# Patient Record
Sex: MALE | Race: WHITE | NOT HISPANIC OR LATINO | Employment: OTHER | URBAN - METROPOLITAN AREA
[De-identification: names, ages, dates, MRNs, and addresses within clinical notes are randomized per-mention and may not be internally consistent; named-entity substitution may affect disease eponyms.]

---

## 2017-01-03 ENCOUNTER — OFFICE VISIT - HEALTHEAST (OUTPATIENT)
Dept: INTERNAL MEDICINE | Facility: CLINIC | Age: 77
End: 2017-01-03

## 2017-01-03 DIAGNOSIS — I10 ESSENTIAL HYPERTENSION WITH GOAL BLOOD PRESSURE LESS THAN 140/90: ICD-10-CM

## 2017-01-03 DIAGNOSIS — E78.5 HYPERLIPIDEMIA, UNSPECIFIED HYPERLIPIDEMIA TYPE: ICD-10-CM

## 2017-01-03 DIAGNOSIS — I48.91 ATRIAL FIBRILLATION (H): ICD-10-CM

## 2017-01-03 ASSESSMENT — MIFFLIN-ST. JEOR: SCORE: 1627.5

## 2017-01-05 ENCOUNTER — OFFICE VISIT - HEALTHEAST (OUTPATIENT)
Dept: PHYSICAL THERAPY | Facility: REHABILITATION | Age: 77
End: 2017-01-05

## 2017-01-05 DIAGNOSIS — R29.3 ABNORMAL POSTURE: ICD-10-CM

## 2017-01-05 DIAGNOSIS — M62.81 MUSCLE WEAKNESS (GENERALIZED): ICD-10-CM

## 2017-01-05 DIAGNOSIS — Z87.39 HISTORY OF ROTATOR CUFF TEAR: ICD-10-CM

## 2017-01-05 DIAGNOSIS — M25.811 SHOULDER IMPINGEMENT, RIGHT: ICD-10-CM

## 2017-01-05 DIAGNOSIS — M25.611 DECREASED ROM OF RIGHT SHOULDER: ICD-10-CM

## 2017-01-05 DIAGNOSIS — M25.511 ACUTE PAIN OF RIGHT SHOULDER: ICD-10-CM

## 2017-01-05 DIAGNOSIS — M25.511 RIGHT SHOULDER PAIN: ICD-10-CM

## 2017-01-10 ENCOUNTER — OFFICE VISIT - HEALTHEAST (OUTPATIENT)
Dept: PHYSICAL THERAPY | Facility: REHABILITATION | Age: 77
End: 2017-01-10

## 2017-01-10 DIAGNOSIS — M25.611 DECREASED ROM OF RIGHT SHOULDER: ICD-10-CM

## 2017-01-10 DIAGNOSIS — K21.9 ESOPHAGEAL REFLUX: ICD-10-CM

## 2017-01-10 DIAGNOSIS — R29.3 ABNORMAL POSTURE: ICD-10-CM

## 2017-01-10 DIAGNOSIS — M25.811 SHOULDER IMPINGEMENT, RIGHT: ICD-10-CM

## 2017-01-10 DIAGNOSIS — M25.511 ACUTE PAIN OF RIGHT SHOULDER: ICD-10-CM

## 2017-01-10 DIAGNOSIS — M25.511 RIGHT SHOULDER PAIN: ICD-10-CM

## 2017-01-10 DIAGNOSIS — Z87.39 HISTORY OF ROTATOR CUFF TEAR: ICD-10-CM

## 2017-01-10 DIAGNOSIS — M62.81 MUSCLE WEAKNESS (GENERALIZED): ICD-10-CM

## 2017-01-12 ENCOUNTER — OFFICE VISIT - HEALTHEAST (OUTPATIENT)
Dept: PHYSICAL THERAPY | Facility: REHABILITATION | Age: 77
End: 2017-01-12

## 2017-01-12 DIAGNOSIS — M25.511 ACUTE PAIN OF RIGHT SHOULDER: ICD-10-CM

## 2017-01-12 DIAGNOSIS — M62.81 MUSCLE WEAKNESS (GENERALIZED): ICD-10-CM

## 2017-01-12 DIAGNOSIS — R29.3 ABNORMAL POSTURE: ICD-10-CM

## 2017-01-12 DIAGNOSIS — M25.611 DECREASED ROM OF RIGHT SHOULDER: ICD-10-CM

## 2017-01-12 DIAGNOSIS — M25.811 SHOULDER IMPINGEMENT, RIGHT: ICD-10-CM

## 2017-01-12 DIAGNOSIS — M25.511 RIGHT SHOULDER PAIN: ICD-10-CM

## 2017-01-12 DIAGNOSIS — Z87.39 HISTORY OF ROTATOR CUFF TEAR: ICD-10-CM

## 2017-01-13 ENCOUNTER — RECORDS - HEALTHEAST (OUTPATIENT)
Dept: ADMINISTRATIVE | Facility: OTHER | Age: 77
End: 2017-01-13

## 2017-01-17 ENCOUNTER — OFFICE VISIT - HEALTHEAST (OUTPATIENT)
Dept: PHYSICAL THERAPY | Facility: REHABILITATION | Age: 77
End: 2017-01-17

## 2017-01-17 ENCOUNTER — AMBULATORY - HEALTHEAST (OUTPATIENT)
Dept: CARDIOLOGY | Facility: CLINIC | Age: 77
End: 2017-01-17

## 2017-01-17 DIAGNOSIS — M25.511 RIGHT SHOULDER PAIN: ICD-10-CM

## 2017-01-17 DIAGNOSIS — R29.3 ABNORMAL POSTURE: ICD-10-CM

## 2017-01-17 DIAGNOSIS — M25.611 DECREASED ROM OF RIGHT SHOULDER: ICD-10-CM

## 2017-01-17 DIAGNOSIS — M25.511 ACUTE PAIN OF RIGHT SHOULDER: ICD-10-CM

## 2017-01-17 DIAGNOSIS — I48.91 A-FIB (H): ICD-10-CM

## 2017-01-17 DIAGNOSIS — M25.811 SHOULDER IMPINGEMENT, RIGHT: ICD-10-CM

## 2017-01-17 DIAGNOSIS — M62.81 MUSCLE WEAKNESS (GENERALIZED): ICD-10-CM

## 2017-01-17 DIAGNOSIS — Z87.39 HISTORY OF ROTATOR CUFF TEAR: ICD-10-CM

## 2017-01-17 DIAGNOSIS — K21.9 ESOPHAGEAL REFLUX: ICD-10-CM

## 2017-01-19 ENCOUNTER — OFFICE VISIT - HEALTHEAST (OUTPATIENT)
Dept: PHYSICAL THERAPY | Facility: REHABILITATION | Age: 77
End: 2017-01-19

## 2017-01-19 DIAGNOSIS — R29.3 ABNORMAL POSTURE: ICD-10-CM

## 2017-01-19 DIAGNOSIS — M25.611 DECREASED ROM OF RIGHT SHOULDER: ICD-10-CM

## 2017-01-19 DIAGNOSIS — M62.81 MUSCLE WEAKNESS (GENERALIZED): ICD-10-CM

## 2017-01-19 DIAGNOSIS — M25.511 ACUTE PAIN OF RIGHT SHOULDER: ICD-10-CM

## 2017-01-19 DIAGNOSIS — Z87.39 HISTORY OF ROTATOR CUFF TEAR: ICD-10-CM

## 2017-01-19 DIAGNOSIS — M25.811 SHOULDER IMPINGEMENT, RIGHT: ICD-10-CM

## 2017-01-21 ENCOUNTER — COMMUNICATION - HEALTHEAST (OUTPATIENT)
Dept: INTERNAL MEDICINE | Facility: CLINIC | Age: 77
End: 2017-01-21

## 2017-01-26 ENCOUNTER — RECORDS - HEALTHEAST (OUTPATIENT)
Dept: ADMINISTRATIVE | Facility: OTHER | Age: 77
End: 2017-01-26

## 2017-02-09 ENCOUNTER — COMMUNICATION - HEALTHEAST (OUTPATIENT)
Dept: CARDIOLOGY | Facility: CLINIC | Age: 77
End: 2017-02-09

## 2017-02-10 ENCOUNTER — AMBULATORY - HEALTHEAST (OUTPATIENT)
Dept: CARDIOLOGY | Facility: CLINIC | Age: 77
End: 2017-02-10

## 2017-02-10 DIAGNOSIS — I48.19 PERSISTENT ATRIAL FIBRILLATION (H): ICD-10-CM

## 2017-02-15 ENCOUNTER — COMMUNICATION - HEALTHEAST (OUTPATIENT)
Dept: CARDIOLOGY | Facility: CLINIC | Age: 77
End: 2017-02-15

## 2017-02-21 ENCOUNTER — COMMUNICATION - HEALTHEAST (OUTPATIENT)
Dept: INTERNAL MEDICINE | Facility: CLINIC | Age: 77
End: 2017-02-21

## 2017-02-21 ENCOUNTER — COMMUNICATION - HEALTHEAST (OUTPATIENT)
Dept: CARDIOLOGY | Facility: CLINIC | Age: 77
End: 2017-02-21

## 2017-03-02 ENCOUNTER — AMBULATORY - HEALTHEAST (OUTPATIENT)
Dept: PODIATRY | Facility: CLINIC | Age: 77
End: 2017-03-02

## 2017-03-02 DIAGNOSIS — B35.1 NAIL FUNGUS: ICD-10-CM

## 2017-03-02 DIAGNOSIS — L60.2 ONYCHAUXIS: ICD-10-CM

## 2017-03-03 ENCOUNTER — HOSPITAL ENCOUNTER (OUTPATIENT)
Dept: CARDIOLOGY | Facility: CLINIC | Age: 77
Discharge: HOME | End: 2017-03-03
Attending: INTERNAL MEDICINE

## 2017-03-03 DIAGNOSIS — I48.91 A-FIB (H): ICD-10-CM

## 2017-03-03 LAB
AORTIC ROOT: 4 CM
AORTIC VALVE MEAN VELOCITY: 117 CM/S
AR DECEL SLOPE: 1420 MM/S2
AR PEAK VELOCITY: 330 CM/S
ASCENDING AORTA: 3.6 CM
AV DIMENSIONLESS INDEX VTI: 0.8
AV MEAN GRADIENT: 6 MMHG
AV PEAK GRADIENT: 11.2 MMHG
AV REGURGITANT PEAK GRADIENT: 43.6 MMHG
AV REGURGITATION PRESSURE HALF TIME: 585 MS
AV VALVE AREA: 3 CM2
AV VELOCITY RATIO: 0.7
DOP CALC AO PEAK VEL: 167 CM/S
DOP CALC AO VTI: 32.7 CM
DOP CALC LVOT AREA: 3.8 CM2
DOP CALC LVOT DIAMETER: 2.2 CM
DOP CALC LVOT PEAK VEL: 120 CM/S
DOP CALC LVOT STROKE VOLUME: 96.5 CM3
DOP CALC MV VTI: 16.6 CM
DOP CALC RVOT PEAK VEL: 56.9 CM/S
DOP CALC RVOT VTI: 11.5 CM
DOP CALCLVOT PEAK VEL VTI: 25.4 CM
EJECTION FRACTION: 59 % (ref 55–75)
FRACTIONAL SHORTENING: 50 % (ref 28–44)
INTERVENTRICULAR SEPTUM IN END DIASTOLE: 1.3 CM (ref 0.6–1)
IVS/PW RATIO: 0.8
LA AREA 1: 37.7 CM2
LA AREA 2: 30.7 CM2
LEFT ATRIUM LENGTH: 6.59 CM
LEFT ATRIUM SIZE: 6.7 CM
LEFT ATRIUM TO AORTIC ROOT RATIO: 1.68 NO UNITS
LEFT ATRIUM VOLUME: 149.3 CM3
LEFT VENTRICLE DIASTOLIC VOLUME: 107 CM3 (ref 62–150)
LEFT VENTRICLE SYSTOLIC VOLUME: 44 CM3 (ref 21–61)
LEFT VENTRICULAR INTERNAL DIMENSION IN DIASTOLE: 5 CM (ref 4.2–5.8)
LEFT VENTRICULAR INTERNAL DIMENSION IN SYSTOLE: 2.5 CM (ref 2.5–4)
LEFT VENTRICULAR MASS: 306.8 G
LEFT VENTRICULAR OUTFLOW TRACT MEAN GRADIENT: 3 MMHG
LEFT VENTRICULAR OUTFLOW TRACT MEAN VELOCITY: 84 CM/S
LEFT VENTRICULAR OUTFLOW TRACT PEAK GRADIENT: 6 MMHG
LEFT VENTRICULAR POSTERIOR WALL IN END DIASTOLE: 1.6 CM (ref 0.6–1)
MITRAL REGURGITANT VELOCITY TIME INTEGRAL: 153 CM
MITRAL VALVE MEAN INFLOW VELOCITY: 60.7 CM/S
MITRAL VALVE PEAK VELOCITY: 101 CM/S
MR MEAN GRADIENT: 61 MMHG
MR MEAN VELOCITY: 363 CM/S
MR PEAK GRADIENT: 104.9 MMHG
MV AREA VTI: 5.81 CM2
MV AVERAGE E/E' RATIO: 8 CM/S
MV DECELERATION TIME: 246 MS
MV E'TISSUE VEL-LAT: 15.7 CM/S
MV E'TISSUE VEL-MED: 7.35 CM/S
MV LATERAL E/E' RATIO: 5.9
MV MEAN GRADIENT: 2 MMHG
MV MEDIAL E/E' RATIO: 12.6
MV PEAK E VELOCITY: 92.6 CM/S
MV PEAK GRADIENT: 4.1 MMHG
MV VALVE AREA BY CONTINUITY EQUATION: 5.8 CM2
PISA MR PEAK VEL: 512 CM/S
PV MEAN GRADIENT: 2 MMHG
PV MEAN VELOCITY: 71.4 CM/S
PV PEAK GRADIENT: 4.8 MMHG
PV VELOCITY TIME INTERVAL: 21.1 CM
PV VMAX: 109 CM/S
RIGHT VENTRICULAR INTERNAL DIMENSION IN DYSTOLE: 4.7 CM
RIGHT VENTRICULAR OUTFLOW PEAK GRADIENT: 1 MMHG
RIGHT VENTRICULAR OUTFLOW TRACT MEAN GRADIENT: 1 MMHG
RVOT MV: 35.4 CM/S
TRICUSPID REGURGITATION PEAK PRESSURE GRADIENT: 13.8 MMHG
TRICUSPID VALVE ANULAR PLANE SYSTOLIC EXCURSION: 2.3 CM
TRICUSPID VALVE PEAK REGURGITANT VELOCITY: 186 CM/S

## 2017-03-08 ENCOUNTER — OFFICE VISIT - HEALTHEAST (OUTPATIENT)
Dept: CARDIOLOGY | Facility: CLINIC | Age: 77
End: 2017-03-08

## 2017-03-08 ENCOUNTER — AMBULATORY - HEALTHEAST (OUTPATIENT)
Dept: CARDIOLOGY | Facility: CLINIC | Age: 77
End: 2017-03-08

## 2017-03-08 ENCOUNTER — COMMUNICATION - HEALTHEAST (OUTPATIENT)
Dept: CARDIOLOGY | Facility: CLINIC | Age: 77
End: 2017-03-08

## 2017-03-08 DIAGNOSIS — Z79.899 LONG TERM USE OF DRUG: ICD-10-CM

## 2017-03-08 DIAGNOSIS — I48.19 PERSISTENT ATRIAL FIBRILLATION (H): ICD-10-CM

## 2017-03-08 DIAGNOSIS — I10 ESSENTIAL HYPERTENSION WITH GOAL BLOOD PRESSURE LESS THAN 140/90: ICD-10-CM

## 2017-03-08 ASSESSMENT — MIFFLIN-ST. JEOR: SCORE: 1626.14

## 2017-03-09 ENCOUNTER — SURGERY - HEALTHEAST (OUTPATIENT)
Dept: CARDIOLOGY | Facility: CLINIC | Age: 77
End: 2017-03-09

## 2017-03-09 ENCOUNTER — AMBULATORY - HEALTHEAST (OUTPATIENT)
Dept: CARDIOLOGY | Facility: CLINIC | Age: 77
End: 2017-03-09

## 2017-03-09 DIAGNOSIS — I48.19 PERSISTENT ATRIAL FIBRILLATION (H): ICD-10-CM

## 2017-03-10 ENCOUNTER — SURGERY - HEALTHEAST (OUTPATIENT)
Dept: CARDIOLOGY | Facility: CLINIC | Age: 77
End: 2017-03-10

## 2017-03-10 DIAGNOSIS — I48.19 PERSISTENT ATRIAL FIBRILLATION (H): ICD-10-CM

## 2017-03-13 ENCOUNTER — AMBULATORY - HEALTHEAST (OUTPATIENT)
Dept: CARDIOLOGY | Facility: CLINIC | Age: 77
End: 2017-03-13

## 2017-03-15 ENCOUNTER — AMBULATORY - HEALTHEAST (OUTPATIENT)
Dept: CARDIOLOGY | Facility: CLINIC | Age: 77
End: 2017-03-15

## 2017-03-17 ENCOUNTER — AMBULATORY - HEALTHEAST (OUTPATIENT)
Dept: CARDIOLOGY | Facility: CLINIC | Age: 77
End: 2017-03-17

## 2017-03-17 DIAGNOSIS — I48.19 PERSISTENT ATRIAL FIBRILLATION (H): ICD-10-CM

## 2017-03-22 ENCOUNTER — ANESTHESIA - HEALTHEAST (OUTPATIENT)
Dept: CARDIOLOGY | Facility: CLINIC | Age: 77
End: 2017-03-22

## 2017-03-24 ENCOUNTER — COMMUNICATION - HEALTHEAST (OUTPATIENT)
Dept: PHYSICAL THERAPY | Facility: REHABILITATION | Age: 77
End: 2017-03-24

## 2017-03-28 ENCOUNTER — AMBULATORY - HEALTHEAST (OUTPATIENT)
Dept: CARDIOLOGY | Facility: CLINIC | Age: 77
End: 2017-03-28

## 2017-03-31 ENCOUNTER — COMMUNICATION - HEALTHEAST (OUTPATIENT)
Dept: CARDIOLOGY | Facility: CLINIC | Age: 77
End: 2017-03-31

## 2017-04-24 ENCOUNTER — AMBULATORY - HEALTHEAST (OUTPATIENT)
Dept: ADMINISTRATIVE | Facility: REHABILITATION | Age: 77
End: 2017-04-24

## 2017-04-24 DIAGNOSIS — M17.12 PRIMARY OSTEOARTHRITIS OF LEFT KNEE: ICD-10-CM

## 2017-04-24 DIAGNOSIS — M25.561 KNEE PAIN, BILATERAL: ICD-10-CM

## 2017-04-24 DIAGNOSIS — M25.562 KNEE PAIN, BILATERAL: ICD-10-CM

## 2017-04-26 ENCOUNTER — AMBULATORY - HEALTHEAST (OUTPATIENT)
Dept: CARDIOLOGY | Facility: CLINIC | Age: 77
End: 2017-04-26

## 2017-04-26 ENCOUNTER — HOSPITAL ENCOUNTER (OUTPATIENT)
Dept: CT IMAGING | Facility: CLINIC | Age: 77
Discharge: HOME | End: 2017-04-26
Attending: INTERNAL MEDICINE

## 2017-04-26 DIAGNOSIS — I48.19 PERSISTENT ATRIAL FIBRILLATION (H): ICD-10-CM

## 2017-04-26 LAB
ATRIAL RATE - MUSE: 241 BPM
BSA FOR ECHO PROCEDURE: 2.07 M2
DIASTOLIC BLOOD PRESSURE - MUSE: NORMAL MMHG
INTERPRETATION ECG - MUSE: NORMAL
P AXIS - MUSE: NORMAL DEGREES
PR INTERVAL - MUSE: NORMAL MS
QRS DURATION - MUSE: 126 MS
QT - MUSE: 416 MS
QTC - MUSE: 479 MS
R AXIS - MUSE: -2 DEGREES
SYSTOLIC BLOOD PRESSURE - MUSE: NORMAL MMHG
T AXIS - MUSE: 53 DEGREES
VENTRICULAR RATE- MUSE: 80 BPM

## 2017-04-26 ASSESSMENT — MIFFLIN-ST. JEOR
SCORE: 1569.89
SCORE: 1597.11

## 2017-05-04 ENCOUNTER — OFFICE VISIT - HEALTHEAST (OUTPATIENT)
Dept: PHYSICAL THERAPY | Facility: REHABILITATION | Age: 77
End: 2017-05-04

## 2017-05-04 DIAGNOSIS — M67.00 HEEL CORD TIGHTNESS, UNSPECIFIED LATERALITY: ICD-10-CM

## 2017-05-04 DIAGNOSIS — M25.562 KNEE PAIN, BILATERAL: ICD-10-CM

## 2017-05-04 DIAGNOSIS — M62.81 MUSCLE WEAKNESS (GENERALIZED): ICD-10-CM

## 2017-05-04 DIAGNOSIS — M25.562 ACUTE PAIN OF BOTH KNEES: ICD-10-CM

## 2017-05-04 DIAGNOSIS — M25.662 DECREASED ROM OF LEFT KNEE: ICD-10-CM

## 2017-05-04 DIAGNOSIS — M25.561 KNEE PAIN, BILATERAL: ICD-10-CM

## 2017-05-04 DIAGNOSIS — M25.561 ACUTE PAIN OF BOTH KNEES: ICD-10-CM

## 2017-05-18 ENCOUNTER — OFFICE VISIT - HEALTHEAST (OUTPATIENT)
Dept: PHYSICAL THERAPY | Facility: REHABILITATION | Age: 77
End: 2017-05-18

## 2017-05-18 DIAGNOSIS — M67.00 HEEL CORD TIGHTNESS, UNSPECIFIED LATERALITY: ICD-10-CM

## 2017-05-18 DIAGNOSIS — M25.662 DECREASED ROM OF LEFT KNEE: ICD-10-CM

## 2017-05-18 DIAGNOSIS — M62.81 MUSCLE WEAKNESS (GENERALIZED): ICD-10-CM

## 2017-05-18 DIAGNOSIS — M25.562 ACUTE PAIN OF BOTH KNEES: ICD-10-CM

## 2017-05-18 DIAGNOSIS — M25.561 ACUTE PAIN OF BOTH KNEES: ICD-10-CM

## 2017-05-22 ENCOUNTER — AMBULATORY - HEALTHEAST (OUTPATIENT)
Dept: CARDIOLOGY | Facility: CLINIC | Age: 77
End: 2017-05-22

## 2017-05-22 ENCOUNTER — OFFICE VISIT - HEALTHEAST (OUTPATIENT)
Dept: CARDIOLOGY | Facility: CLINIC | Age: 77
End: 2017-05-22

## 2017-05-22 ENCOUNTER — COMMUNICATION - HEALTHEAST (OUTPATIENT)
Dept: CARDIOLOGY | Facility: CLINIC | Age: 77
End: 2017-05-22

## 2017-05-22 DIAGNOSIS — I10 ESSENTIAL HYPERTENSION WITH GOAL BLOOD PRESSURE LESS THAN 140/90: ICD-10-CM

## 2017-05-22 DIAGNOSIS — I48.19 PERSISTENT ATRIAL FIBRILLATION (H): ICD-10-CM

## 2017-05-22 LAB
ATRIAL RATE - MUSE: 83 BPM
DIASTOLIC BLOOD PRESSURE - MUSE: NORMAL MMHG
INTERPRETATION ECG - MUSE: NORMAL
P AXIS - MUSE: NORMAL DEGREES
PR INTERVAL - MUSE: NORMAL MS
QRS DURATION - MUSE: 124 MS
QT - MUSE: 432 MS
QTC - MUSE: 459 MS
R AXIS - MUSE: -2 DEGREES
SYSTOLIC BLOOD PRESSURE - MUSE: NORMAL MMHG
T AXIS - MUSE: 46 DEGREES
VENTRICULAR RATE- MUSE: 68 BPM

## 2017-05-22 ASSESSMENT — MIFFLIN-ST. JEOR: SCORE: 1601.65

## 2017-05-25 ENCOUNTER — ANESTHESIA - HEALTHEAST (OUTPATIENT)
Dept: CARDIOLOGY | Facility: CLINIC | Age: 77
End: 2017-05-25

## 2017-05-25 ENCOUNTER — SURGERY - HEALTHEAST (OUTPATIENT)
Dept: CARDIOLOGY | Facility: CLINIC | Age: 77
End: 2017-05-25

## 2017-05-26 ENCOUNTER — SURGERY - HEALTHEAST (OUTPATIENT)
Dept: CARDIOLOGY | Facility: CLINIC | Age: 77
End: 2017-05-26

## 2017-05-26 ASSESSMENT — MIFFLIN-ST. JEOR: SCORE: 1547.21

## 2017-05-27 ASSESSMENT — MIFFLIN-ST. JEOR: SCORE: 1621.6

## 2017-06-05 ENCOUNTER — OFFICE VISIT - HEALTHEAST (OUTPATIENT)
Dept: INTERNAL MEDICINE | Facility: CLINIC | Age: 77
End: 2017-06-05

## 2017-06-05 DIAGNOSIS — J18.9 PNEUMONIA OF BOTH LOWER LOBES DUE TO INFECTIOUS ORGANISM: ICD-10-CM

## 2017-06-05 DIAGNOSIS — I48.4 ATYPICAL ATRIAL FLUTTER (H): ICD-10-CM

## 2017-06-05 DIAGNOSIS — M19.90 OSTEOARTHRITIS: ICD-10-CM

## 2017-06-05 DIAGNOSIS — J18.9 PNEUMONIA: ICD-10-CM

## 2017-06-05 ASSESSMENT — MIFFLIN-ST. JEOR: SCORE: 1601.65

## 2017-06-16 ENCOUNTER — OFFICE VISIT - HEALTHEAST (OUTPATIENT)
Dept: PHYSICAL THERAPY | Facility: REHABILITATION | Age: 77
End: 2017-06-16

## 2017-06-16 DIAGNOSIS — M25.561 ACUTE PAIN OF BOTH KNEES: ICD-10-CM

## 2017-06-16 DIAGNOSIS — M62.81 MUSCLE WEAKNESS (GENERALIZED): ICD-10-CM

## 2017-06-16 DIAGNOSIS — M67.00 HEEL CORD TIGHTNESS, UNSPECIFIED LATERALITY: ICD-10-CM

## 2017-06-16 DIAGNOSIS — M25.562 ACUTE PAIN OF BOTH KNEES: ICD-10-CM

## 2017-06-16 DIAGNOSIS — M25.662 DECREASED ROM OF LEFT KNEE: ICD-10-CM

## 2017-06-22 ENCOUNTER — AMBULATORY - HEALTHEAST (OUTPATIENT)
Dept: PODIATRY | Facility: CLINIC | Age: 77
End: 2017-06-22

## 2017-06-22 DIAGNOSIS — L60.2 ONYCHAUXIS: ICD-10-CM

## 2017-06-22 DIAGNOSIS — B35.1 NAIL FUNGUS: ICD-10-CM

## 2017-06-26 ENCOUNTER — COMMUNICATION - HEALTHEAST (OUTPATIENT)
Dept: INTERNAL MEDICINE | Facility: CLINIC | Age: 77
End: 2017-06-26

## 2017-06-27 ENCOUNTER — COMMUNICATION - HEALTHEAST (OUTPATIENT)
Dept: INTERNAL MEDICINE | Facility: CLINIC | Age: 77
End: 2017-06-27

## 2017-07-03 ENCOUNTER — RECORDS - HEALTHEAST (OUTPATIENT)
Dept: GENERAL RADIOLOGY | Facility: CLINIC | Age: 77
End: 2017-07-03

## 2017-07-03 ENCOUNTER — OFFICE VISIT - HEALTHEAST (OUTPATIENT)
Dept: INTERNAL MEDICINE | Facility: CLINIC | Age: 77
End: 2017-07-03

## 2017-07-03 DIAGNOSIS — J18.9 PNEUMONIA OF BOTH LOWER LOBES DUE TO INFECTIOUS ORGANISM: ICD-10-CM

## 2017-07-03 DIAGNOSIS — J32.9 SINUS INFECTION: ICD-10-CM

## 2017-07-03 DIAGNOSIS — J18.9 PNEUMONIA, UNSPECIFIED ORGANISM: ICD-10-CM

## 2017-07-03 ASSESSMENT — MIFFLIN-ST. JEOR: SCORE: 1615.25

## 2017-07-31 ENCOUNTER — OFFICE VISIT - HEALTHEAST (OUTPATIENT)
Dept: CARDIOLOGY | Facility: CLINIC | Age: 77
End: 2017-07-31

## 2017-07-31 DIAGNOSIS — I10 ESSENTIAL HYPERTENSION WITH GOAL BLOOD PRESSURE LESS THAN 140/90: ICD-10-CM

## 2017-07-31 DIAGNOSIS — I48.19 PERSISTENT ATRIAL FIBRILLATION (H): ICD-10-CM

## 2017-07-31 DIAGNOSIS — I48.4 ATYPICAL ATRIAL FLUTTER (H): ICD-10-CM

## 2017-07-31 DIAGNOSIS — Z98.890 STATUS POST CATHETER ABLATION OF ATRIAL FIBRILLATION: ICD-10-CM

## 2017-07-31 ASSESSMENT — MIFFLIN-ST. JEOR: SCORE: 1606.18

## 2017-08-01 ENCOUNTER — HOSPITAL ENCOUNTER (OUTPATIENT)
Dept: CARDIOLOGY | Facility: CLINIC | Age: 77
Discharge: HOME | End: 2017-08-01
Attending: NURSE PRACTITIONER

## 2017-08-01 DIAGNOSIS — I48.4 ATYPICAL ATRIAL FLUTTER (H): ICD-10-CM

## 2017-08-01 DIAGNOSIS — Z98.890 STATUS POST CATHETER ABLATION OF ATRIAL FIBRILLATION: ICD-10-CM

## 2017-08-01 DIAGNOSIS — I48.19 PERSISTENT ATRIAL FIBRILLATION (H): ICD-10-CM

## 2017-08-14 ENCOUNTER — COMMUNICATION - HEALTHEAST (OUTPATIENT)
Dept: CARDIOLOGY | Facility: CLINIC | Age: 77
End: 2017-08-14

## 2017-08-14 ENCOUNTER — COMMUNICATION - HEALTHEAST (OUTPATIENT)
Dept: INTERNAL MEDICINE | Facility: CLINIC | Age: 77
End: 2017-08-14

## 2017-08-14 DIAGNOSIS — I48.91 ATRIAL FIBRILLATION (H): ICD-10-CM

## 2017-08-14 DIAGNOSIS — I48.19 PERSISTENT ATRIAL FIBRILLATION (H): ICD-10-CM

## 2017-08-15 ENCOUNTER — COMMUNICATION - HEALTHEAST (OUTPATIENT)
Dept: CARDIOLOGY | Facility: CLINIC | Age: 77
End: 2017-08-15

## 2017-08-16 ENCOUNTER — SURGERY - HEALTHEAST (OUTPATIENT)
Dept: CARDIOLOGY | Facility: CLINIC | Age: 77
End: 2017-08-16

## 2017-08-16 ENCOUNTER — AMBULATORY - HEALTHEAST (OUTPATIENT)
Dept: CARDIOLOGY | Facility: CLINIC | Age: 77
End: 2017-08-16

## 2017-08-16 DIAGNOSIS — I48.19 PERSISTENT ATRIAL FIBRILLATION (H): ICD-10-CM

## 2017-08-16 DIAGNOSIS — I48.91 A-FIB (H): ICD-10-CM

## 2017-08-16 ASSESSMENT — MIFFLIN-ST. JEOR: SCORE: 1606.18

## 2017-08-17 LAB
ATRIAL RATE - MUSE: 133 BPM
DIASTOLIC BLOOD PRESSURE - MUSE: NORMAL MMHG
INTERPRETATION ECG - MUSE: NORMAL
P AXIS - MUSE: NORMAL DEGREES
PR INTERVAL - MUSE: NORMAL MS
QRS DURATION - MUSE: 114 MS
QT - MUSE: 384 MS
QTC - MUSE: 467 MS
R AXIS - MUSE: -18 DEGREES
SYSTOLIC BLOOD PRESSURE - MUSE: NORMAL MMHG
T AXIS - MUSE: 63 DEGREES
VENTRICULAR RATE- MUSE: 89 BPM

## 2017-08-18 ENCOUNTER — ANESTHESIA - HEALTHEAST (OUTPATIENT)
Dept: CARDIOLOGY | Facility: CLINIC | Age: 77
End: 2017-08-18

## 2017-08-29 ENCOUNTER — OFFICE VISIT - HEALTHEAST (OUTPATIENT)
Dept: CARDIOLOGY | Facility: CLINIC | Age: 77
End: 2017-08-29

## 2017-08-29 ENCOUNTER — AMBULATORY - HEALTHEAST (OUTPATIENT)
Dept: CARDIOLOGY | Facility: CLINIC | Age: 77
End: 2017-08-29

## 2017-08-29 DIAGNOSIS — Z98.890 STATUS POST CATHETER ABLATION OF ATRIAL FIBRILLATION: ICD-10-CM

## 2017-08-29 DIAGNOSIS — I10 ESSENTIAL HYPERTENSION WITH GOAL BLOOD PRESSURE LESS THAN 140/90: ICD-10-CM

## 2017-08-29 DIAGNOSIS — I48.19 PERSISTENT ATRIAL FIBRILLATION (H): ICD-10-CM

## 2017-08-29 ASSESSMENT — MIFFLIN-ST. JEOR: SCORE: 1619.79

## 2017-09-05 ENCOUNTER — HOSPITAL ENCOUNTER (OUTPATIENT)
Dept: CARDIOLOGY | Facility: CLINIC | Age: 77
Discharge: HOME | End: 2017-09-05
Attending: INTERNAL MEDICINE

## 2017-09-05 DIAGNOSIS — I48.19 PERSISTENT ATRIAL FIBRILLATION (H): ICD-10-CM

## 2017-09-05 DIAGNOSIS — I10 ESSENTIAL HYPERTENSION WITH GOAL BLOOD PRESSURE LESS THAN 140/90: ICD-10-CM

## 2017-09-07 ENCOUNTER — RECORDS - HEALTHEAST (OUTPATIENT)
Dept: ADMINISTRATIVE | Facility: OTHER | Age: 77
End: 2017-09-07

## 2017-09-25 ENCOUNTER — AMBULATORY - HEALTHEAST (OUTPATIENT)
Dept: CARDIOLOGY | Facility: CLINIC | Age: 77
End: 2017-09-25

## 2017-09-25 ENCOUNTER — COMMUNICATION - HEALTHEAST (OUTPATIENT)
Dept: INTERNAL MEDICINE | Facility: CLINIC | Age: 77
End: 2017-09-25

## 2017-09-25 ENCOUNTER — AMBULATORY - HEALTHEAST (OUTPATIENT)
Dept: PODIATRY | Facility: CLINIC | Age: 77
End: 2017-09-25

## 2017-09-25 ENCOUNTER — COMMUNICATION - HEALTHEAST (OUTPATIENT)
Dept: CARDIOLOGY | Facility: CLINIC | Age: 77
End: 2017-09-25

## 2017-09-25 DIAGNOSIS — B35.1 NAIL FUNGUS: ICD-10-CM

## 2017-09-25 DIAGNOSIS — I48.19 PERSISTENT ATRIAL FIBRILLATION (H): ICD-10-CM

## 2017-09-25 DIAGNOSIS — L60.2 ONYCHAUXIS: ICD-10-CM

## 2017-09-25 LAB
ATRIAL RATE - MUSE: 277 BPM
DIASTOLIC BLOOD PRESSURE - MUSE: NORMAL MMHG
INTERPRETATION ECG - MUSE: NORMAL
P AXIS - MUSE: NORMAL DEGREES
PR INTERVAL - MUSE: NORMAL MS
QRS DURATION - MUSE: 116 MS
QT - MUSE: 394 MS
QTC - MUSE: 476 MS
R AXIS - MUSE: -7 DEGREES
SYSTOLIC BLOOD PRESSURE - MUSE: NORMAL MMHG
T AXIS - MUSE: 50 DEGREES
VENTRICULAR RATE- MUSE: 88 BPM

## 2017-09-25 ASSESSMENT — MIFFLIN-ST. JEOR: SCORE: 1619.79

## 2017-09-26 ENCOUNTER — COMMUNICATION - HEALTHEAST (OUTPATIENT)
Dept: CARDIOLOGY | Facility: CLINIC | Age: 77
End: 2017-09-26

## 2017-09-26 ENCOUNTER — AMBULATORY - HEALTHEAST (OUTPATIENT)
Dept: CARDIOLOGY | Facility: CLINIC | Age: 77
End: 2017-09-26

## 2017-09-26 ENCOUNTER — SURGERY - HEALTHEAST (OUTPATIENT)
Dept: CARDIOLOGY | Facility: CLINIC | Age: 77
End: 2017-09-26

## 2017-09-26 DIAGNOSIS — I48.4 ATYPICAL ATRIAL FLUTTER (H): ICD-10-CM

## 2017-09-27 ENCOUNTER — COMMUNICATION - HEALTHEAST (OUTPATIENT)
Dept: CARDIOLOGY | Facility: CLINIC | Age: 77
End: 2017-09-27

## 2017-09-28 ENCOUNTER — COMMUNICATION - HEALTHEAST (OUTPATIENT)
Dept: INTERNAL MEDICINE | Facility: CLINIC | Age: 77
End: 2017-09-28

## 2017-09-28 DIAGNOSIS — Z12.5 ENCOUNTER FOR PROSTATE CANCER SCREENING: ICD-10-CM

## 2017-09-28 DIAGNOSIS — E55.9 VITAMIN D DEFICIENCY: ICD-10-CM

## 2017-09-28 DIAGNOSIS — Z79.899 MEDICATION MANAGEMENT: ICD-10-CM

## 2017-10-04 ENCOUNTER — OFFICE VISIT - HEALTHEAST (OUTPATIENT)
Dept: INTERNAL MEDICINE | Facility: CLINIC | Age: 77
End: 2017-10-04

## 2017-10-04 DIAGNOSIS — I48.4 ATYPICAL ATRIAL FLUTTER (H): ICD-10-CM

## 2017-10-04 DIAGNOSIS — C61 MALIGNANT NEOPLASM OF PROSTATE (H): ICD-10-CM

## 2017-10-04 DIAGNOSIS — E55.9 VITAMIN D DEFICIENCY: ICD-10-CM

## 2017-10-04 DIAGNOSIS — Z00.01 ENCOUNTER FOR GENERAL ADULT MEDICAL EXAMINATION WITH ABNORMAL FINDINGS: ICD-10-CM

## 2017-10-04 DIAGNOSIS — E78.5 HYPERLIPIDEMIA, UNSPECIFIED HYPERLIPIDEMIA TYPE: ICD-10-CM

## 2017-10-04 LAB
CHOLEST SERPL-MCNC: 168 MG/DL
FASTING STATUS PATIENT QL REPORTED: NO
HDLC SERPL-MCNC: 34 MG/DL
LDLC SERPL CALC-MCNC: 122 MG/DL
PSA SERPL-MCNC: 1.8 NG/ML (ref 0–6.5)
TRIGL SERPL-MCNC: 62 MG/DL

## 2017-10-04 ASSESSMENT — MIFFLIN-ST. JEOR: SCORE: 1590.87

## 2017-10-06 ENCOUNTER — HOSPITAL ENCOUNTER (OUTPATIENT)
Dept: CARDIOLOGY | Facility: CLINIC | Age: 77
Discharge: HOME | End: 2017-10-06
Attending: NURSE PRACTITIONER

## 2017-10-07 ENCOUNTER — COMMUNICATION - HEALTHEAST (OUTPATIENT)
Dept: INTERNAL MEDICINE | Facility: CLINIC | Age: 77
End: 2017-10-07

## 2017-11-15 ENCOUNTER — AMBULATORY - HEALTHEAST (OUTPATIENT)
Dept: ADMINISTRATIVE | Facility: REHABILITATION | Age: 77
End: 2017-11-15

## 2017-11-15 DIAGNOSIS — G89.29 CHRONIC LEFT HIP PAIN: ICD-10-CM

## 2017-11-15 DIAGNOSIS — M25.552 CHRONIC LEFT HIP PAIN: ICD-10-CM

## 2017-11-29 ENCOUNTER — OFFICE VISIT - HEALTHEAST (OUTPATIENT)
Dept: CARDIOLOGY | Facility: CLINIC | Age: 77
End: 2017-11-29

## 2017-11-29 DIAGNOSIS — I10 ESSENTIAL HYPERTENSION WITH GOAL BLOOD PRESSURE LESS THAN 140/90: ICD-10-CM

## 2017-11-29 DIAGNOSIS — I48.19 PERSISTENT ATRIAL FIBRILLATION (H): ICD-10-CM

## 2017-11-29 DIAGNOSIS — I48.4 ATYPICAL ATRIAL FLUTTER (H): ICD-10-CM

## 2017-11-29 LAB
ATRIAL RATE - MUSE: 58 BPM
DIASTOLIC BLOOD PRESSURE - MUSE: NORMAL MMHG
INTERPRETATION ECG - MUSE: NORMAL
P AXIS - MUSE: 63 DEGREES
PR INTERVAL - MUSE: 230 MS
QRS DURATION - MUSE: 110 MS
QT - MUSE: 454 MS
QTC - MUSE: 445 MS
R AXIS - MUSE: 19 DEGREES
SYSTOLIC BLOOD PRESSURE - MUSE: NORMAL MMHG
T AXIS - MUSE: 59 DEGREES
VENTRICULAR RATE- MUSE: 58 BPM

## 2017-11-29 ASSESSMENT — MIFFLIN-ST. JEOR: SCORE: 1599.94

## 2017-12-01 ENCOUNTER — OFFICE VISIT - HEALTHEAST (OUTPATIENT)
Dept: PHYSICAL THERAPY | Facility: REHABILITATION | Age: 77
End: 2017-12-01

## 2017-12-01 DIAGNOSIS — G89.29 CHRONIC LEFT HIP PAIN: ICD-10-CM

## 2017-12-01 DIAGNOSIS — M25.552 CHRONIC LEFT HIP PAIN: ICD-10-CM

## 2017-12-01 DIAGNOSIS — M62.81 MUSCLE WEAKNESS (GENERALIZED): ICD-10-CM

## 2017-12-01 DIAGNOSIS — M25.669 DECREASED ROM OF LOWER EXTREMITY: ICD-10-CM

## 2017-12-05 ENCOUNTER — COMMUNICATION - HEALTHEAST (OUTPATIENT)
Dept: INTERNAL MEDICINE | Facility: CLINIC | Age: 77
End: 2017-12-05

## 2017-12-05 DIAGNOSIS — I10 ESSENTIAL HYPERTENSION: ICD-10-CM

## 2017-12-13 ENCOUNTER — COMMUNICATION - HEALTHEAST (OUTPATIENT)
Dept: INTERNAL MEDICINE | Facility: CLINIC | Age: 77
End: 2017-12-13

## 2017-12-14 ENCOUNTER — COMMUNICATION - HEALTHEAST (OUTPATIENT)
Dept: INTERNAL MEDICINE | Facility: CLINIC | Age: 77
End: 2017-12-14

## 2017-12-14 ENCOUNTER — COMMUNICATION - HEALTHEAST (OUTPATIENT)
Dept: CARDIOLOGY | Facility: CLINIC | Age: 77
End: 2017-12-14

## 2017-12-14 RX ORDER — CLOTRIMAZOLE AND BETAMETHASONE DIPROPIONATE 10; .5 MG/ML; MG/ML
LOTION TOPICAL
Qty: 60 ML | Status: SHIPPED | OUTPATIENT
Start: 2017-12-14

## 2017-12-16 ENCOUNTER — COMMUNICATION - HEALTHEAST (OUTPATIENT)
Dept: INTERNAL MEDICINE | Facility: CLINIC | Age: 77
End: 2017-12-16

## 2017-12-18 ENCOUNTER — RECORDS - HEALTHEAST (OUTPATIENT)
Dept: ADMINISTRATIVE | Facility: OTHER | Age: 77
End: 2017-12-18

## 2017-12-19 ENCOUNTER — OFFICE VISIT - HEALTHEAST (OUTPATIENT)
Dept: PHYSICAL THERAPY | Facility: REHABILITATION | Age: 77
End: 2017-12-19

## 2017-12-19 DIAGNOSIS — M62.81 MUSCLE WEAKNESS (GENERALIZED): ICD-10-CM

## 2017-12-19 DIAGNOSIS — G89.29 CHRONIC LEFT HIP PAIN: ICD-10-CM

## 2017-12-19 DIAGNOSIS — M25.552 CHRONIC LEFT HIP PAIN: ICD-10-CM

## 2017-12-19 DIAGNOSIS — M25.669 DECREASED ROM OF LOWER EXTREMITY: ICD-10-CM

## 2017-12-21 ENCOUNTER — AMBULATORY - HEALTHEAST (OUTPATIENT)
Dept: PODIATRY | Facility: CLINIC | Age: 77
End: 2017-12-21

## 2017-12-21 DIAGNOSIS — L60.2 ONYCHAUXIS: ICD-10-CM

## 2017-12-21 DIAGNOSIS — B35.1 NAIL FUNGUS: ICD-10-CM

## 2018-01-05 ENCOUNTER — OFFICE VISIT - HEALTHEAST (OUTPATIENT)
Dept: PHYSICAL THERAPY | Facility: REHABILITATION | Age: 78
End: 2018-01-05

## 2018-01-05 DIAGNOSIS — G89.29 CHRONIC LEFT HIP PAIN: ICD-10-CM

## 2018-01-05 DIAGNOSIS — M25.552 CHRONIC LEFT HIP PAIN: ICD-10-CM

## 2018-01-05 DIAGNOSIS — M25.669 DECREASED ROM OF LOWER EXTREMITY: ICD-10-CM

## 2018-01-05 DIAGNOSIS — M62.81 MUSCLE WEAKNESS (GENERALIZED): ICD-10-CM

## 2018-01-08 ENCOUNTER — COMMUNICATION - HEALTHEAST (OUTPATIENT)
Dept: INTERNAL MEDICINE | Facility: CLINIC | Age: 78
End: 2018-01-08

## 2018-01-26 ENCOUNTER — OFFICE VISIT - HEALTHEAST (OUTPATIENT)
Dept: PHYSICAL THERAPY | Facility: REHABILITATION | Age: 78
End: 2018-01-26

## 2018-01-26 DIAGNOSIS — M25.669 DECREASED ROM OF LOWER EXTREMITY: ICD-10-CM

## 2018-01-26 DIAGNOSIS — M62.81 MUSCLE WEAKNESS (GENERALIZED): ICD-10-CM

## 2018-01-26 DIAGNOSIS — G89.29 CHRONIC LEFT HIP PAIN: ICD-10-CM

## 2018-01-26 DIAGNOSIS — M25.552 CHRONIC LEFT HIP PAIN: ICD-10-CM

## 2018-02-13 ENCOUNTER — RECORDS - HEALTHEAST (OUTPATIENT)
Dept: ADMINISTRATIVE | Facility: OTHER | Age: 78
End: 2018-02-13

## 2018-02-13 ENCOUNTER — COMMUNICATION - HEALTHEAST (OUTPATIENT)
Dept: PHYSICAL THERAPY | Facility: REHABILITATION | Age: 78
End: 2018-02-13

## 2018-03-02 ENCOUNTER — OFFICE VISIT - HEALTHEAST (OUTPATIENT)
Dept: PHYSICAL THERAPY | Facility: REHABILITATION | Age: 78
End: 2018-03-02

## 2018-03-02 DIAGNOSIS — G89.29 CHRONIC LEFT HIP PAIN: ICD-10-CM

## 2018-03-02 DIAGNOSIS — M25.552 CHRONIC LEFT HIP PAIN: ICD-10-CM

## 2018-03-02 DIAGNOSIS — M62.81 MUSCLE WEAKNESS (GENERALIZED): ICD-10-CM

## 2018-03-02 DIAGNOSIS — M25.669 DECREASED ROM OF LOWER EXTREMITY: ICD-10-CM

## 2018-03-08 ENCOUNTER — COMMUNICATION - HEALTHEAST (OUTPATIENT)
Dept: CARDIOLOGY | Facility: CLINIC | Age: 78
End: 2018-03-08

## 2018-03-08 DIAGNOSIS — I48.91 A-FIB (H): ICD-10-CM

## 2018-03-16 ENCOUNTER — OFFICE VISIT - HEALTHEAST (OUTPATIENT)
Dept: PHYSICAL THERAPY | Facility: REHABILITATION | Age: 78
End: 2018-03-16

## 2018-03-16 DIAGNOSIS — M25.552 CHRONIC LEFT HIP PAIN: ICD-10-CM

## 2018-03-16 DIAGNOSIS — M62.81 MUSCLE WEAKNESS (GENERALIZED): ICD-10-CM

## 2018-03-16 DIAGNOSIS — G89.29 CHRONIC LEFT HIP PAIN: ICD-10-CM

## 2018-03-16 DIAGNOSIS — M25.669 DECREASED ROM OF LOWER EXTREMITY: ICD-10-CM

## 2018-03-22 ENCOUNTER — AMBULATORY - HEALTHEAST (OUTPATIENT)
Dept: PODIATRY | Facility: CLINIC | Age: 78
End: 2018-03-22

## 2018-03-22 DIAGNOSIS — L60.2 ONYCHAUXIS: ICD-10-CM

## 2018-03-22 DIAGNOSIS — B35.1 NAIL FUNGUS: ICD-10-CM

## 2018-03-27 ENCOUNTER — OFFICE VISIT - HEALTHEAST (OUTPATIENT)
Dept: PHYSICAL THERAPY | Facility: REHABILITATION | Age: 78
End: 2018-03-27

## 2018-03-27 DIAGNOSIS — M25.669 DECREASED ROM OF LOWER EXTREMITY: ICD-10-CM

## 2018-03-27 DIAGNOSIS — M62.81 MUSCLE WEAKNESS (GENERALIZED): ICD-10-CM

## 2018-03-27 DIAGNOSIS — M25.552 CHRONIC LEFT HIP PAIN: ICD-10-CM

## 2018-03-27 DIAGNOSIS — G89.29 CHRONIC LEFT HIP PAIN: ICD-10-CM

## 2018-04-01 ENCOUNTER — COMMUNICATION - HEALTHEAST (OUTPATIENT)
Dept: CARDIOLOGY | Facility: CLINIC | Age: 78
End: 2018-04-01

## 2018-04-02 ENCOUNTER — OFFICE VISIT - HEALTHEAST (OUTPATIENT)
Dept: CARDIOLOGY | Facility: CLINIC | Age: 78
End: 2018-04-02

## 2018-04-02 ENCOUNTER — COMMUNICATION - HEALTHEAST (OUTPATIENT)
Dept: CARDIOLOGY | Facility: CLINIC | Age: 78
End: 2018-04-02

## 2018-04-02 DIAGNOSIS — I48.19 PERSISTENT ATRIAL FIBRILLATION (H): ICD-10-CM

## 2018-04-02 DIAGNOSIS — I10 ESSENTIAL HYPERTENSION WITH GOAL BLOOD PRESSURE LESS THAN 140/90: ICD-10-CM

## 2018-04-02 DIAGNOSIS — I48.4 ATYPICAL ATRIAL FLUTTER (H): ICD-10-CM

## 2018-04-02 ASSESSMENT — MIFFLIN-ST. JEOR: SCORE: 1609.01

## 2018-04-03 ENCOUNTER — AMBULATORY - HEALTHEAST (OUTPATIENT)
Dept: CARDIOLOGY | Facility: CLINIC | Age: 78
End: 2018-04-03

## 2018-04-04 ENCOUNTER — ANESTHESIA - HEALTHEAST (OUTPATIENT)
Dept: CARDIOLOGY | Facility: CLINIC | Age: 78
End: 2018-04-04

## 2018-04-04 LAB
ATRIAL RATE - MUSE: 288 BPM
DIASTOLIC BLOOD PRESSURE - MUSE: NORMAL MMHG
INTERPRETATION ECG - MUSE: NORMAL
P AXIS - MUSE: NORMAL DEGREES
PR INTERVAL - MUSE: NORMAL MS
QRS DURATION - MUSE: 110 MS
QT - MUSE: 360 MS
QTC - MUSE: 469 MS
R AXIS - MUSE: -6 DEGREES
SYSTOLIC BLOOD PRESSURE - MUSE: NORMAL MMHG
T AXIS - MUSE: 45 DEGREES
VENTRICULAR RATE- MUSE: 102 BPM

## 2018-04-24 ENCOUNTER — RECORDS - HEALTHEAST (OUTPATIENT)
Dept: ADMINISTRATIVE | Facility: OTHER | Age: 78
End: 2018-04-24

## 2018-05-01 ENCOUNTER — COMMUNICATION - HEALTHEAST (OUTPATIENT)
Dept: INTERNAL MEDICINE | Facility: CLINIC | Age: 78
End: 2018-05-01

## 2018-05-01 ENCOUNTER — OFFICE VISIT - HEALTHEAST (OUTPATIENT)
Dept: INTERNAL MEDICINE | Facility: CLINIC | Age: 78
End: 2018-05-01

## 2018-05-01 DIAGNOSIS — Z01.818 PREOP EXAM FOR INTERNAL MEDICINE: ICD-10-CM

## 2018-05-01 LAB
ANION GAP SERPL CALCULATED.3IONS-SCNC: 9 MMOL/L (ref 5–18)
BUN SERPL-MCNC: 16 MG/DL (ref 8–28)
CALCIUM SERPL-MCNC: 9.9 MG/DL (ref 8.5–10.5)
CHLORIDE BLD-SCNC: 99 MMOL/L (ref 98–107)
CO2 SERPL-SCNC: 26 MMOL/L (ref 22–31)
CREAT SERPL-MCNC: 0.77 MG/DL (ref 0.7–1.3)
GFR SERPL CREATININE-BSD FRML MDRD: >60 ML/MIN/1.73M2
GLUCOSE BLD-MCNC: 71 MG/DL (ref 70–125)
HGB BLD-MCNC: 13.1 G/DL (ref 14–18)
POTASSIUM BLD-SCNC: 4.8 MMOL/L (ref 3.5–5)
SODIUM SERPL-SCNC: 134 MMOL/L (ref 136–145)

## 2018-05-01 ASSESSMENT — MIFFLIN-ST. JEOR: SCORE: 1615.25

## 2018-05-24 ASSESSMENT — MIFFLIN-ST. JEOR: SCORE: 1569.89

## 2018-05-25 ENCOUNTER — COMMUNICATION - HEALTHEAST (OUTPATIENT)
Dept: INTERNAL MEDICINE | Facility: CLINIC | Age: 78
End: 2018-05-25

## 2018-05-25 RX ORDER — KETOCONAZOLE 20 MG/G
1 CREAM TOPICAL 2 TIMES DAILY PRN
Qty: 15 G | Refills: 0 | Status: SHIPPED | OUTPATIENT
Start: 2018-05-25

## 2018-05-29 ENCOUNTER — OFFICE VISIT - HEALTHEAST (OUTPATIENT)
Dept: CARDIOLOGY | Facility: CLINIC | Age: 78
End: 2018-05-29

## 2018-05-29 ENCOUNTER — COMMUNICATION - HEALTHEAST (OUTPATIENT)
Dept: INTERNAL MEDICINE | Facility: CLINIC | Age: 78
End: 2018-05-29

## 2018-05-29 DIAGNOSIS — I48.19 PERSISTENT ATRIAL FIBRILLATION (H): ICD-10-CM

## 2018-05-29 DIAGNOSIS — I48.4 ATYPICAL ATRIAL FLUTTER (H): ICD-10-CM

## 2018-05-29 DIAGNOSIS — Z98.890 STATUS POST CATHETER ABLATION OF ATRIAL FIBRILLATION: ICD-10-CM

## 2018-05-29 ASSESSMENT — MIFFLIN-ST. JEOR: SCORE: 1606.18

## 2018-05-30 ENCOUNTER — ANESTHESIA - HEALTHEAST (OUTPATIENT)
Dept: SURGERY | Facility: CLINIC | Age: 78
End: 2018-05-30

## 2018-05-30 ENCOUNTER — SURGERY - HEALTHEAST (OUTPATIENT)
Dept: SURGERY | Facility: CLINIC | Age: 78
End: 2018-05-30

## 2018-05-30 ASSESSMENT — MIFFLIN-ST. JEOR
SCORE: 1568.53
SCORE: 1565.36

## 2018-06-03 ENCOUNTER — COMMUNICATION - HEALTHEAST (OUTPATIENT)
Dept: INTERNAL MEDICINE | Facility: CLINIC | Age: 78
End: 2018-06-03

## 2018-06-03 DIAGNOSIS — I10 ESSENTIAL HYPERTENSION: ICD-10-CM

## 2018-06-08 ENCOUNTER — OFFICE VISIT - HEALTHEAST (OUTPATIENT)
Dept: INTERNAL MEDICINE | Facility: CLINIC | Age: 78
End: 2018-06-08

## 2018-06-08 DIAGNOSIS — D64.9 ANEMIA, UNSPECIFIED TYPE: ICD-10-CM

## 2018-06-08 DIAGNOSIS — M16.12 PRIMARY OSTEOARTHRITIS OF LEFT HIP: ICD-10-CM

## 2018-06-08 DIAGNOSIS — K59.00 CONSTIPATION: ICD-10-CM

## 2018-06-08 DIAGNOSIS — G56.02 CARPAL TUNNEL SYNDROME OF LEFT WRIST: ICD-10-CM

## 2018-06-08 LAB
ERYTHROCYTE [DISTWIDTH] IN BLOOD BY AUTOMATED COUNT: 11 % (ref 11–14.5)
HCT VFR BLD AUTO: 31.2 % (ref 40–54)
HGB BLD-MCNC: 10.8 G/DL (ref 14–18)
MCH RBC QN AUTO: 31.7 PG (ref 27–34)
MCHC RBC AUTO-ENTMCNC: 34.7 G/DL (ref 32–36)
MCV RBC AUTO: 91 FL (ref 80–100)
PLATELET # BLD AUTO: 408 THOU/UL (ref 140–440)
PMV BLD AUTO: 7.2 FL (ref 7–10)
RBC # BLD AUTO: 3.41 MILL/UL (ref 4.4–6.2)
WBC: 8.9 THOU/UL (ref 4–11)

## 2018-06-14 ENCOUNTER — RECORDS - HEALTHEAST (OUTPATIENT)
Dept: ADMINISTRATIVE | Facility: OTHER | Age: 78
End: 2018-06-14

## 2018-07-06 ENCOUNTER — HOSPITAL ENCOUNTER (OUTPATIENT)
Dept: PHYSICAL MEDICINE AND REHAB | Facility: CLINIC | Age: 78
Discharge: HOME | End: 2018-07-06
Attending: INTERNAL MEDICINE

## 2018-07-06 DIAGNOSIS — G56.02 CARPAL TUNNEL SYNDROME OF LEFT WRIST: ICD-10-CM

## 2018-07-17 ENCOUNTER — RECORDS - HEALTHEAST (OUTPATIENT)
Dept: ADMINISTRATIVE | Facility: OTHER | Age: 78
End: 2018-07-17

## 2018-08-08 ENCOUNTER — COMMUNICATION - HEALTHEAST (OUTPATIENT)
Dept: CARDIOLOGY | Facility: CLINIC | Age: 78
End: 2018-08-08

## 2018-08-15 ENCOUNTER — OFFICE VISIT - HEALTHEAST (OUTPATIENT)
Dept: CARDIOLOGY | Facility: CLINIC | Age: 78
End: 2018-08-15

## 2018-08-15 DIAGNOSIS — I48.4 ATYPICAL ATRIAL FLUTTER (H): ICD-10-CM

## 2018-08-15 DIAGNOSIS — I48.19 PERSISTENT ATRIAL FIBRILLATION (H): ICD-10-CM

## 2018-08-15 DIAGNOSIS — I10 ESSENTIAL HYPERTENSION WITH GOAL BLOOD PRESSURE LESS THAN 140/90: ICD-10-CM

## 2018-08-15 LAB
ATRIAL RATE - MUSE: 288 BPM
DIASTOLIC BLOOD PRESSURE - MUSE: NORMAL MMHG
INTERPRETATION ECG - MUSE: NORMAL
P AXIS - MUSE: 84 DEGREES
PR INTERVAL - MUSE: NORMAL MS
QRS DURATION - MUSE: 108 MS
QT - MUSE: 408 MS
QTC - MUSE: 459 MS
R AXIS - MUSE: 5 DEGREES
SYSTOLIC BLOOD PRESSURE - MUSE: NORMAL MMHG
T AXIS - MUSE: 53 DEGREES
VENTRICULAR RATE- MUSE: 76 BPM

## 2018-08-15 ASSESSMENT — MIFFLIN-ST. JEOR: SCORE: 1589.17

## 2018-08-16 ENCOUNTER — AMBULATORY - HEALTHEAST (OUTPATIENT)
Dept: CARDIOLOGY | Facility: CLINIC | Age: 78
End: 2018-08-16

## 2018-08-17 ENCOUNTER — COMMUNICATION - HEALTHEAST (OUTPATIENT)
Dept: CARDIOLOGY | Facility: CLINIC | Age: 78
End: 2018-08-17

## 2018-08-17 ENCOUNTER — COMMUNICATION - HEALTHEAST (OUTPATIENT)
Dept: INTERNAL MEDICINE | Facility: CLINIC | Age: 78
End: 2018-08-17

## 2018-08-17 DIAGNOSIS — I48.0 PAROXYSMAL ATRIAL FIBRILLATION (H): ICD-10-CM

## 2018-08-20 ENCOUNTER — AMBULATORY - HEALTHEAST (OUTPATIENT)
Dept: CARDIOLOGY | Facility: CLINIC | Age: 78
End: 2018-08-20

## 2018-08-20 DIAGNOSIS — I48.19 PERSISTENT ATRIAL FIBRILLATION (H): ICD-10-CM

## 2018-08-20 DIAGNOSIS — I48.0 PAROXYSMAL ATRIAL FIBRILLATION (H): ICD-10-CM

## 2018-08-20 LAB
ATRIAL RATE - MUSE: 52 BPM
DIASTOLIC BLOOD PRESSURE - MUSE: NORMAL MMHG
INTERPRETATION ECG - MUSE: NORMAL
P AXIS - MUSE: 65 DEGREES
PR INTERVAL - MUSE: 244 MS
QRS DURATION - MUSE: 112 MS
QT - MUSE: 500 MS
QTC - MUSE: 465 MS
R AXIS - MUSE: 17 DEGREES
SYSTOLIC BLOOD PRESSURE - MUSE: NORMAL MMHG
T AXIS - MUSE: 47 DEGREES
VENTRICULAR RATE- MUSE: 52 BPM

## 2018-08-20 ASSESSMENT — MIFFLIN-ST. JEOR: SCORE: 1593.71

## 2018-08-29 ENCOUNTER — COMMUNICATION - HEALTHEAST (OUTPATIENT)
Dept: CARDIOLOGY | Facility: CLINIC | Age: 78
End: 2018-08-29

## 2018-08-29 DIAGNOSIS — I48.19 PERSISTENT ATRIAL FIBRILLATION (H): ICD-10-CM

## 2018-09-04 ENCOUNTER — RECORDS - HEALTHEAST (OUTPATIENT)
Dept: ADMINISTRATIVE | Facility: OTHER | Age: 78
End: 2018-09-04

## 2018-09-15 ENCOUNTER — COMMUNICATION - HEALTHEAST (OUTPATIENT)
Dept: CARDIOLOGY | Facility: CLINIC | Age: 78
End: 2018-09-15

## 2018-09-15 DIAGNOSIS — I48.91 A-FIB (H): ICD-10-CM

## 2018-09-17 ENCOUNTER — COMMUNICATION - HEALTHEAST (OUTPATIENT)
Dept: CARDIOLOGY | Facility: CLINIC | Age: 78
End: 2018-09-17

## 2018-09-17 DIAGNOSIS — I48.19 PERSISTENT ATRIAL FIBRILLATION (H): ICD-10-CM

## 2018-09-17 RX ORDER — SOTALOL HYDROCHLORIDE 80 MG/1
80 TABLET ORAL EVERY 12 HOURS
Qty: 180 TABLET | Refills: 1 | Status: SHIPPED | OUTPATIENT
Start: 2018-09-17

## 2018-09-20 ENCOUNTER — OFFICE VISIT - HEALTHEAST (OUTPATIENT)
Dept: PODIATRY | Facility: CLINIC | Age: 78
End: 2018-09-20

## 2018-09-20 DIAGNOSIS — B35.1 NAIL FUNGUS: ICD-10-CM

## 2018-09-20 DIAGNOSIS — L60.2 ONYCHAUXIS: ICD-10-CM

## 2018-10-08 ENCOUNTER — OFFICE VISIT - HEALTHEAST (OUTPATIENT)
Dept: INTERNAL MEDICINE | Facility: CLINIC | Age: 78
End: 2018-10-08

## 2018-10-08 DIAGNOSIS — E55.9 VITAMIN D DEFICIENCY: ICD-10-CM

## 2018-10-08 DIAGNOSIS — C61 PROSTATE CANCER (H): ICD-10-CM

## 2018-10-08 DIAGNOSIS — I10 ESSENTIAL HYPERTENSION WITH GOAL BLOOD PRESSURE LESS THAN 140/90: ICD-10-CM

## 2018-10-08 DIAGNOSIS — E78.5 HYPERLIPIDEMIA, UNSPECIFIED HYPERLIPIDEMIA TYPE: ICD-10-CM

## 2018-10-08 DIAGNOSIS — Z23 FLU VACCINE NEED: ICD-10-CM

## 2018-10-08 DIAGNOSIS — M25.362 KNEE INSTABILITY, LEFT: ICD-10-CM

## 2018-10-08 DIAGNOSIS — I48.0 PAROXYSMAL ATRIAL FIBRILLATION (H): ICD-10-CM

## 2018-10-08 LAB
ALBUMIN SERPL-MCNC: 4 G/DL (ref 3.5–5)
ALBUMIN UR-MCNC: NEGATIVE MG/DL
ALP SERPL-CCNC: 94 U/L (ref 45–120)
ALT SERPL W P-5'-P-CCNC: 13 U/L (ref 0–45)
ANION GAP SERPL CALCULATED.3IONS-SCNC: 8 MMOL/L (ref 5–18)
APPEARANCE UR: CLEAR
AST SERPL W P-5'-P-CCNC: 14 U/L (ref 0–40)
BILIRUB DIRECT SERPL-MCNC: 0.2 MG/DL
BILIRUB SERPL-MCNC: 0.8 MG/DL (ref 0–1)
BILIRUB UR QL STRIP: NEGATIVE
BUN SERPL-MCNC: 16 MG/DL (ref 8–28)
CALCIUM SERPL-MCNC: 9.6 MG/DL (ref 8.5–10.5)
CHLORIDE BLD-SCNC: 100 MMOL/L (ref 98–107)
CHOLEST SERPL-MCNC: 165 MG/DL
CO2 SERPL-SCNC: 26 MMOL/L (ref 22–31)
COLOR UR AUTO: YELLOW
CREAT SERPL-MCNC: 0.72 MG/DL (ref 0.7–1.3)
ERYTHROCYTE [DISTWIDTH] IN BLOOD BY AUTOMATED COUNT: 11.5 % (ref 11–14.5)
FASTING STATUS PATIENT QL REPORTED: YES
GFR SERPL CREATININE-BSD FRML MDRD: >60 ML/MIN/1.73M2
GLUCOSE BLD-MCNC: 87 MG/DL (ref 70–125)
GLUCOSE UR STRIP-MCNC: NEGATIVE MG/DL
HCT VFR BLD AUTO: 39 % (ref 40–54)
HDLC SERPL-MCNC: 31 MG/DL
HGB BLD-MCNC: 13.4 G/DL (ref 14–18)
HGB UR QL STRIP: NEGATIVE
KETONES UR STRIP-MCNC: NEGATIVE MG/DL
LDLC SERPL CALC-MCNC: 117 MG/DL
LEUKOCYTE ESTERASE UR QL STRIP: NEGATIVE
MCH RBC QN AUTO: 30.7 PG (ref 27–34)
MCHC RBC AUTO-ENTMCNC: 34.3 G/DL (ref 32–36)
MCV RBC AUTO: 90 FL (ref 80–100)
NITRATE UR QL: NEGATIVE
PH UR STRIP: 7 [PH] (ref 5–8)
PLATELET # BLD AUTO: 227 THOU/UL (ref 140–440)
PMV BLD AUTO: 7.8 FL (ref 7–10)
POTASSIUM BLD-SCNC: 4.3 MMOL/L (ref 3.5–5)
PROT SERPL-MCNC: 6.6 G/DL (ref 6–8)
PSA SERPL-MCNC: 2 NG/ML (ref 0–6.5)
RBC # BLD AUTO: 4.35 MILL/UL (ref 4.4–6.2)
SODIUM SERPL-SCNC: 134 MMOL/L (ref 136–145)
SP GR UR STRIP: 1.01 (ref 1–1.03)
TRIGL SERPL-MCNC: 83 MG/DL
TSH SERPL DL<=0.005 MIU/L-ACNC: 1.53 UIU/ML (ref 0.3–5)
UROBILINOGEN UR STRIP-ACNC: NORMAL
WBC: 6 THOU/UL (ref 4–11)

## 2018-10-08 ASSESSMENT — MIFFLIN-ST. JEOR: SCORE: 1580.95

## 2018-10-09 LAB
25(OH)D3 SERPL-MCNC: 28.8 NG/ML (ref 30–80)
25(OH)D3 SERPL-MCNC: 28.8 NG/ML (ref 30–80)

## 2018-10-15 ENCOUNTER — COMMUNICATION - HEALTHEAST (OUTPATIENT)
Dept: CARDIOLOGY | Facility: CLINIC | Age: 78
End: 2018-10-15

## 2018-11-01 ENCOUNTER — OFFICE VISIT - HEALTHEAST (OUTPATIENT)
Dept: INTERNAL MEDICINE | Facility: CLINIC | Age: 78
End: 2018-11-01

## 2018-11-01 DIAGNOSIS — K21.9 GASTROESOPHAGEAL REFLUX DISEASE WITHOUT ESOPHAGITIS: ICD-10-CM

## 2018-11-01 DIAGNOSIS — C61 MALIGNANT NEOPLASM OF PROSTATE (H): ICD-10-CM

## 2018-11-01 DIAGNOSIS — I48.0 PAROXYSMAL ATRIAL FIBRILLATION (H): ICD-10-CM

## 2018-11-01 DIAGNOSIS — I48.4 ATYPICAL ATRIAL FLUTTER (H): ICD-10-CM

## 2018-11-01 DIAGNOSIS — M19.90 OSTEOARTHRITIS: ICD-10-CM

## 2018-11-01 DIAGNOSIS — I10 ESSENTIAL HYPERTENSION WITH GOAL BLOOD PRESSURE LESS THAN 140/90: ICD-10-CM

## 2018-11-01 DIAGNOSIS — Z87.898 HISTORY OF SNORING: ICD-10-CM

## 2018-11-01 ASSESSMENT — MIFFLIN-ST. JEOR: SCORE: 1593.71

## 2018-11-02 ENCOUNTER — OFFICE VISIT - HEALTHEAST (OUTPATIENT)
Dept: PHYSICAL THERAPY | Facility: REHABILITATION | Age: 78
End: 2018-11-02

## 2018-11-02 DIAGNOSIS — G89.29 CHRONIC PAIN OF LEFT KNEE: ICD-10-CM

## 2018-11-02 DIAGNOSIS — M25.662 DECREASED ROM OF LEFT KNEE: ICD-10-CM

## 2018-11-02 DIAGNOSIS — R29.898 WEAKNESS OF LEFT LOWER EXTREMITY: ICD-10-CM

## 2018-11-02 DIAGNOSIS — M25.562 CHRONIC PAIN OF LEFT KNEE: ICD-10-CM

## 2018-11-14 ENCOUNTER — COMMUNICATION - HEALTHEAST (OUTPATIENT)
Dept: INTERNAL MEDICINE | Facility: CLINIC | Age: 78
End: 2018-11-14

## 2018-11-14 DIAGNOSIS — I48.91 ATRIAL FIBRILLATION (H): ICD-10-CM

## 2018-11-15 RX ORDER — APIXABAN 5 MG/1
TABLET, FILM COATED ORAL
Qty: 180 TABLET | Refills: 3 | Status: SHIPPED | OUTPATIENT
Start: 2018-11-15

## 2018-11-19 ENCOUNTER — COMMUNICATION - HEALTHEAST (OUTPATIENT)
Dept: CARDIOLOGY | Facility: CLINIC | Age: 78
End: 2018-11-19

## 2018-11-26 ENCOUNTER — SURGERY - HEALTHEAST (OUTPATIENT)
Dept: CARDIOLOGY | Facility: CLINIC | Age: 78
End: 2018-11-26

## 2018-11-26 ENCOUNTER — OFFICE VISIT - HEALTHEAST (OUTPATIENT)
Dept: CARDIOLOGY | Facility: CLINIC | Age: 78
End: 2018-11-26

## 2018-11-26 DIAGNOSIS — I10 ESSENTIAL HYPERTENSION WITH GOAL BLOOD PRESSURE LESS THAN 140/90: ICD-10-CM

## 2018-11-26 DIAGNOSIS — I48.0 PAROXYSMAL ATRIAL FIBRILLATION (H): ICD-10-CM

## 2018-11-26 ASSESSMENT — MIFFLIN-ST. JEOR: SCORE: 1589.17

## 2018-11-27 ENCOUNTER — AMBULATORY - HEALTHEAST (OUTPATIENT)
Dept: CARDIOLOGY | Facility: CLINIC | Age: 78
End: 2018-11-27

## 2018-11-27 LAB
ATRIAL RATE - MUSE: 258 BPM
DIASTOLIC BLOOD PRESSURE - MUSE: NORMAL MMHG
INTERPRETATION ECG - MUSE: NORMAL
P AXIS - MUSE: NORMAL DEGREES
PR INTERVAL - MUSE: NORMAL MS
QRS DURATION - MUSE: 108 MS
QT - MUSE: 412 MS
QTC - MUSE: 438 MS
R AXIS - MUSE: 37 DEGREES
SYSTOLIC BLOOD PRESSURE - MUSE: NORMAL MMHG
T AXIS - MUSE: 66 DEGREES
VENTRICULAR RATE- MUSE: 68 BPM

## 2018-11-30 ENCOUNTER — ANESTHESIA - HEALTHEAST (OUTPATIENT)
Dept: CARDIOLOGY | Facility: CLINIC | Age: 78
End: 2018-11-30

## 2018-12-11 ENCOUNTER — COMMUNICATION - HEALTHEAST (OUTPATIENT)
Dept: INTERNAL MEDICINE | Facility: CLINIC | Age: 78
End: 2018-12-11

## 2018-12-14 ENCOUNTER — OFFICE VISIT - HEALTHEAST (OUTPATIENT)
Dept: INTERNAL MEDICINE | Facility: CLINIC | Age: 78
End: 2018-12-14

## 2018-12-14 DIAGNOSIS — C61 MALIGNANT NEOPLASM OF PROSTATE (H): ICD-10-CM

## 2018-12-14 DIAGNOSIS — I34.0 MILD MITRAL REGURGITATION: ICD-10-CM

## 2018-12-14 DIAGNOSIS — Z98.890 STATUS POST CATHETER ABLATION OF ATRIAL FIBRILLATION: ICD-10-CM

## 2018-12-14 DIAGNOSIS — I48.0 PAROXYSMAL ATRIAL FIBRILLATION (H): ICD-10-CM

## 2018-12-14 DIAGNOSIS — I10 ESSENTIAL HYPERTENSION WITH GOAL BLOOD PRESSURE LESS THAN 140/90: ICD-10-CM

## 2018-12-14 DIAGNOSIS — K21.9 GASTROESOPHAGEAL REFLUX DISEASE WITHOUT ESOPHAGITIS: ICD-10-CM

## 2018-12-14 DIAGNOSIS — Z01.818 PRE-OPERATIVE EXAMINATION: ICD-10-CM

## 2018-12-14 DIAGNOSIS — I35.1 MODERATE AORTIC INSUFFICIENCY: ICD-10-CM

## 2018-12-14 LAB
ATRIAL RATE - MUSE: 81 BPM
CREAT SERPL-MCNC: 0.79 MG/DL (ref 0.7–1.3)
DIASTOLIC BLOOD PRESSURE - MUSE: NORMAL MMHG
GFR SERPL CREATININE-BSD FRML MDRD: >60 ML/MIN/1.73M2
INTERPRETATION ECG - MUSE: NORMAL
P AXIS - MUSE: NORMAL DEGREES
POTASSIUM BLD-SCNC: 4.3 MMOL/L (ref 3.5–5)
PR INTERVAL - MUSE: NORMAL MS
QRS DURATION - MUSE: 108 MS
QT - MUSE: 398 MS
QTC - MUSE: 484 MS
R AXIS - MUSE: 28 DEGREES
SYSTOLIC BLOOD PRESSURE - MUSE: NORMAL MMHG
T AXIS - MUSE: 55 DEGREES
VENTRICULAR RATE- MUSE: 89 BPM

## 2018-12-14 ASSESSMENT — MIFFLIN-ST. JEOR: SCORE: 1575.56

## 2018-12-17 ENCOUNTER — COMMUNICATION - HEALTHEAST (OUTPATIENT)
Dept: CARDIOLOGY | Facility: CLINIC | Age: 78
End: 2018-12-17

## 2018-12-26 ENCOUNTER — COMMUNICATION - HEALTHEAST (OUTPATIENT)
Dept: INTERNAL MEDICINE | Facility: CLINIC | Age: 78
End: 2018-12-26

## 2018-12-26 DIAGNOSIS — Z01.818 PREOPERATIVE EXAMINATION: ICD-10-CM

## 2018-12-27 ENCOUNTER — AMBULATORY - HEALTHEAST (OUTPATIENT)
Dept: LAB | Facility: CLINIC | Age: 78
End: 2018-12-27

## 2018-12-27 ENCOUNTER — COMMUNICATION - HEALTHEAST (OUTPATIENT)
Dept: INTERNAL MEDICINE | Facility: CLINIC | Age: 78
End: 2018-12-27

## 2018-12-27 DIAGNOSIS — Z01.818 PREOPERATIVE EXAMINATION: ICD-10-CM

## 2018-12-27 LAB
ANION GAP SERPL CALCULATED.3IONS-SCNC: 11 MMOL/L (ref 5–18)
BASOPHILS # BLD AUTO: 0 THOU/UL (ref 0–0.2)
BASOPHILS NFR BLD AUTO: 1 % (ref 0–2)
BUN SERPL-MCNC: 16 MG/DL (ref 8–28)
CALCIUM SERPL-MCNC: 9.7 MG/DL (ref 8.5–10.5)
CHLORIDE BLD-SCNC: 101 MMOL/L (ref 98–107)
CO2 SERPL-SCNC: 24 MMOL/L (ref 22–31)
CREAT SERPL-MCNC: 0.77 MG/DL (ref 0.7–1.3)
EOSINOPHIL # BLD AUTO: 0.2 THOU/UL (ref 0–0.4)
EOSINOPHIL NFR BLD AUTO: 3 % (ref 0–6)
ERYTHROCYTE [DISTWIDTH] IN BLOOD BY AUTOMATED COUNT: 11.5 % (ref 11–14.5)
GFR SERPL CREATININE-BSD FRML MDRD: >60 ML/MIN/1.73M2
GLUCOSE BLD-MCNC: 88 MG/DL (ref 70–125)
HCT VFR BLD AUTO: 40.2 % (ref 40–54)
HGB BLD-MCNC: 13.7 G/DL (ref 14–18)
LYMPHOCYTES # BLD AUTO: 2.7 THOU/UL (ref 0.8–4.4)
LYMPHOCYTES NFR BLD AUTO: 38 % (ref 20–40)
MCH RBC QN AUTO: 31.9 PG (ref 27–34)
MCHC RBC AUTO-ENTMCNC: 34 G/DL (ref 32–36)
MCV RBC AUTO: 94 FL (ref 80–100)
MONOCYTES # BLD AUTO: 0.5 THOU/UL (ref 0–0.9)
MONOCYTES NFR BLD AUTO: 7 % (ref 2–10)
NEUTROPHILS # BLD AUTO: 3.7 THOU/UL (ref 2–7.7)
NEUTROPHILS NFR BLD AUTO: 52 % (ref 50–70)
PLATELET # BLD AUTO: 236 THOU/UL (ref 140–440)
PMV BLD AUTO: 7.6 FL (ref 7–10)
POTASSIUM BLD-SCNC: 4 MMOL/L (ref 3.5–5)
RBC # BLD AUTO: 4.28 MILL/UL (ref 4.4–6.2)
SODIUM SERPL-SCNC: 136 MMOL/L (ref 136–145)
WBC: 7.2 THOU/UL (ref 4–11)

## 2019-01-04 ENCOUNTER — COMMUNICATION - HEALTHEAST (OUTPATIENT)
Dept: INTERNAL MEDICINE | Facility: CLINIC | Age: 79
End: 2019-01-04

## 2019-03-22 ENCOUNTER — RECORDS - HEALTHEAST (OUTPATIENT)
Dept: ADMINISTRATIVE | Facility: OTHER | Age: 79
End: 2019-03-22

## 2019-05-20 ENCOUNTER — COMMUNICATION - HEALTHEAST (OUTPATIENT)
Dept: INTERNAL MEDICINE | Facility: CLINIC | Age: 79
End: 2019-05-20

## 2019-05-20 DIAGNOSIS — I10 ESSENTIAL HYPERTENSION: ICD-10-CM

## 2019-11-19 ENCOUNTER — COMMUNICATION - HEALTHEAST (OUTPATIENT)
Dept: INTERNAL MEDICINE | Facility: CLINIC | Age: 79
End: 2019-11-19

## 2019-11-19 DIAGNOSIS — I10 ESSENTIAL HYPERTENSION: ICD-10-CM

## 2019-11-19 RX ORDER — LOSARTAN POTASSIUM 100 MG/1
100 TABLET ORAL DAILY
Qty: 90 TABLET | Refills: 0 | Status: SHIPPED | OUTPATIENT
Start: 2019-11-19

## 2021-05-29 NOTE — TELEPHONE ENCOUNTER
Refill Approved    Rx renewed per Medication Renewal Policy. Medication was last renewed on 6/4/18.    Danielle Briggs, [facility], [place] Connection Triage/Med Refill 5/21/2019     Requested Prescriptions   Pending Prescriptions Disp Refills     losartan (COZAAR) 100 MG tablet [Pharmacy Med Name: LOSARTAN 100MG TABLETS] 90 tablet 0     Sig: TAKE 1 TABLET BY MOUTH EVERY DAY       Angiotensin Receptor Blocker Protocol Passed - 5/20/2019  8:49 AM        Passed - PCP or prescribing provider visit in past 12 months       Last office visit with prescriber/PCP: 6/8/2018 Rossy Aguilera MD OR same dept: 6/8/2018 Rossy Aguilera MD OR same specialty: 11/1/2018 Vincent Navarro MD  Last physical: 10/8/2018 Last MTM visit: Visit date not found   Next visit within 3 mo: Visit date not found  Next physical within 3 mo: Visit date not found  Prescriber OR PCP: Rossy Aguilera MD  Last diagnosis associated with med order: 1. Essential hypertension  - losartan (COZAAR) 100 MG tablet [Pharmacy Med Name: LOSARTAN 100MG TABLETS]; TAKE 1 TABLET BY MOUTH EVERY DAY  Dispense: 90 tablet; Refill: 0    If protocol passes may refill for 12 months if within 3 months of last provider visit (or a total of 15 months).             Passed - Serum potassium within the past 12 months     Lab Results   Component Value Date    Potassium 4.0 12/27/2018             Passed - Blood pressure filed in past 12 months     BP Readings from Last 1 Encounters:   12/14/18 118/64             Passed - Serum creatinine within the past 12 months     Creatinine   Date Value Ref Range Status   12/27/2018 0.77 0.70 - 1.30 mg/dL Final

## 2021-05-30 VITALS — BODY MASS INDEX: 30.76 KG/M2 | HEIGHT: 69 IN | WEIGHT: 207.7 LBS

## 2021-05-30 VITALS
HEIGHT: 69 IN | WEIGHT: 195 LBS | BODY MASS INDEX: 29.77 KG/M2 | BODY MASS INDEX: 28.88 KG/M2 | WEIGHT: 201 LBS | HEIGHT: 69 IN

## 2021-05-30 VITALS — WEIGHT: 202 LBS | BODY MASS INDEX: 29.92 KG/M2 | HEIGHT: 69 IN

## 2021-05-30 VITALS — BODY MASS INDEX: 30.57 KG/M2 | WEIGHT: 206.4 LBS | HEIGHT: 69 IN

## 2021-05-30 VITALS — HEIGHT: 69 IN | WEIGHT: 202 LBS | BODY MASS INDEX: 29.92 KG/M2

## 2021-05-30 VITALS — HEIGHT: 69 IN | BODY MASS INDEX: 30.72 KG/M2 | WEIGHT: 207.4 LBS

## 2021-05-31 VITALS — HEIGHT: 68 IN | WEIGHT: 206 LBS | BODY MASS INDEX: 31.22 KG/M2

## 2021-05-31 VITALS — HEIGHT: 69 IN | BODY MASS INDEX: 30.07 KG/M2 | WEIGHT: 203 LBS

## 2021-05-31 VITALS — BODY MASS INDEX: 30.07 KG/M2 | WEIGHT: 203 LBS | HEIGHT: 69 IN

## 2021-05-31 VITALS — BODY MASS INDEX: 30.36 KG/M2 | HEIGHT: 69 IN | WEIGHT: 205 LBS

## 2021-05-31 VITALS — WEIGHT: 204 LBS | HEIGHT: 68 IN | BODY MASS INDEX: 30.92 KG/M2

## 2021-05-31 VITALS — BODY MASS INDEX: 30.51 KG/M2 | HEIGHT: 69 IN | WEIGHT: 206 LBS

## 2021-06-01 VITALS — HEIGHT: 69 IN | WEIGHT: 202 LBS | BODY MASS INDEX: 29.92 KG/M2

## 2021-06-01 VITALS — WEIGHT: 208 LBS | HEIGHT: 68 IN | BODY MASS INDEX: 31.52 KG/M2

## 2021-06-01 VITALS — WEIGHT: 204 LBS | BODY MASS INDEX: 30.13 KG/M2

## 2021-06-01 VITALS — WEIGHT: 199.19 LBS | BODY MASS INDEX: 29.5 KG/M2 | HEIGHT: 69 IN

## 2021-06-01 VITALS — WEIGHT: 194 LBS | BODY MASS INDEX: 28.73 KG/M2 | HEIGHT: 69 IN

## 2021-06-01 VITALS — BODY MASS INDEX: 30.07 KG/M2 | WEIGHT: 203 LBS | HEIGHT: 69 IN

## 2021-06-01 VITALS — WEIGHT: 205 LBS | BODY MASS INDEX: 30.36 KG/M2 | HEIGHT: 69 IN

## 2021-06-01 VITALS — WEIGHT: 201 LBS | BODY MASS INDEX: 29.77 KG/M2 | HEIGHT: 69 IN

## 2021-06-02 VITALS — BODY MASS INDEX: 29.92 KG/M2 | HEIGHT: 69 IN | WEIGHT: 202 LBS

## 2021-06-02 VITALS — HEIGHT: 69 IN | BODY MASS INDEX: 29.77 KG/M2 | WEIGHT: 201 LBS

## 2021-06-02 VITALS — BODY MASS INDEX: 29.33 KG/M2 | WEIGHT: 198 LBS | HEIGHT: 69 IN

## 2021-06-03 NOTE — TELEPHONE ENCOUNTER
Refill Approved    Rx renewed per Medication Renewal Policy. Medication was last renewed on 5/21/19.    Nadine Xavier, [facility] [place] Connection Triage/Med Refill 11/19/2019     Requested Prescriptions   Pending Prescriptions Disp Refills     losartan (COZAAR) 100 MG tablet [Pharmacy Med Name: LOSARTAN 100MG TABLETS] 90 tablet 0     Sig: TAKE 1 TABLET BY MOUTH EVERY DAY       Angiotensin Receptor Blocker Protocol Passed - 11/19/2019  2:27 PM        Passed - PCP or prescribing provider visit in past 12 months       Last office visit with prescriber/PCP: 11/1/2018 Vincent Navarro MD OR same dept: Visit date not found OR same specialty: 11/1/2018 Vincent Navarro MD  Last physical: 12/14/2018 Last MTM visit: Visit date not found   Next visit within 3 mo: Visit date not found  Next physical within 3 mo: Visit date not found  Prescriber OR PCP: Vincent Navarro MD  Last diagnosis associated with med order: 1. Essential hypertension  - losartan (COZAAR) 100 MG tablet [Pharmacy Med Name: LOSARTAN 100MG TABLETS]; TAKE 1 TABLET BY MOUTH EVERY DAY  Dispense: 90 tablet; Refill: 0    If protocol passes may refill for 12 months if within 3 months of last provider visit (or a total of 15 months).             Passed - Serum potassium within the past 12 months     Lab Results   Component Value Date    Potassium 4.0 12/27/2018             Passed - Blood pressure filed in past 12 months     BP Readings from Last 1 Encounters:   12/14/18 118/64             Passed - Serum creatinine within the past 12 months     Creatinine   Date Value Ref Range Status   12/27/2018 0.77 0.70 - 1.30 mg/dL Final

## 2021-06-08 NOTE — PROGRESS NOTES
[facility] Daily Progress     Patient Name: Jaylan Alberto  PRECAUTIONS/RESTRICTIONS:HO right rotator cuff tear, OA, bicep tear, atrial fibullation HTN  Date: 1/12/2017  Visit #:3/  PTA visit #:  2  Referral Diagnosis:   Referring provider: Rossy Aguilera, MD  Visit Diagnosis:     ICD-10-CM    1. Right shoulder pain M25.511    2. Muscle weakness (generalized) M62.81    3. Decreased ROM of right shoulder M25.611    4. Abnormal posture R29.3    5. Shoulder impingement, right M75.41    6. History of rotator cuff tear Z87.39    7. Acute pain of right shoulder M25.511          Assessment:   Complaint to HEP  Reports relief with US  Cues for posture needed    Goal Status: Ongoing  Pt. will demonstrate/verbalize independence in self-management of condition in : 6 weeks  Pt. will be independent with home exercise program in : 6 weeks  Pt. will decrease use of medication for pain for improved quality of life in : Discontinued  Pt. will have improved quality of sleep: with less pain;waking less times/night;in 6 weeks;Comment  Comment:: tolerating right SL for sleep with less pain, greater duration  Patient will decrease : SPADI score;by _ points;for improved quality of function;for improved quality of life;in 6 weeks  by ___ points: 10  Pt will: be able to abhijeet/doff UE garments with less pain and difficulty in 6 weeks  Pt will: be able to type with less pain and difficulty in 6 weeks    Plan / Patient Education:     Continue with POC  Progress ex as tolerated   Continue US /CFM/STM as needed    Subjective:     Pain Rating: c/o right shoulder pain ant and lateral discomfort  Pt states he is sore today not sure why may have slept wrong  Ex 2x/day      Objective:     Cues for posture   Today's Exercises: reviewed HEP  Reviewed  elbow flex and ER/IR with wand  Added IR/ADD ex with OTB  Continued US to right A/P shoulder      Treatment Today 1/12/2017      TREATMENT MINUTES COMMENTS   Evaluation     Self-care/ Home  management     Manual therapy 7  CFM/STM to ant lat shoulder   Neuromuscular Re-education     Therapeutic Activity     Therapeutic Exercises 10    Gait training     Modality__________________ 10 + 3 set up US to right shoulder A/P 2MHz 50% at 0.8wts/cm2              Total 30    Blank areas are intentional and mean the treatment did not include these items.       Mary Grace Farrell  1/12/2017

## 2021-06-08 NOTE — PROGRESS NOTES
"   [facility] Follow Up Note    Jaylan Alberto   76 y.o. male    Date of Visit: 1/3/2017    Chief Complaint   Patient presents with     Hypertension     Subjective  Jaylan comes in today to follow up hypertension.  He sent me a message via my chart and his blood pressure was elevated so we added amlodipine and his blood pressures at home have been running pretty good.  He has not had any side effects  to amiodarone.  He is planning on trying to exercise a little more.  They're going to [place] next month.    ROS A comprehensive review of systems was performed and was otherwise negative    Medications were reconciled.  Allergies, social and family history, and the problem list were all reviewed and updated.      Exam  General Appearance: Pleasant and alert  Vitals:    01/03/17 0826   BP: 138/70   Pulse: 60   Resp: 16   Weight: 207 lb 11.2 oz (94.2 kg)   Height: 5' 9\" (1.753 m)      Body mass index is 30.67 kg/(m^2).  Wt Readings from Last 3 Encounters:   01/03/17 207 lb 11.2 oz (94.2 kg)   11/29/16 207 lb 8 oz (94.1 kg)   11/18/16 207 lb (93.9 kg)     HEENT: Sclera are clear.   Lungs: Normal respirations  Abdomen: Soft and nondistended  Extremities: No edema  Skin: No rashes  Neuro: Moves all extremities and has facial symmetry  Gait: Ambulates with a normal gait    Assessment/Plan  1. Essential hypertension with goal blood pressure less than 140/90  We'll continue with his current regimen, he'll continue to have his blood pressure checked at home.  If his blood pressure is not staying under 140/90 with then plan on adding HCTZ to his losartan.    2.  Atrial fibrillation  He remains anticoagulated on our questions and is on amiodarone.    3.  Hyperlipidemia  Diet control only.    Rossy Aguilera MD  1/3/2017    Much or all of the text in this note was generated through the use of Dragon Dictate voice-to-text software. Errors in spelling or words which seem out of context are unintentional. Sound alike " errors, in particular, may have escaped editing.

## 2021-06-08 NOTE — PROGRESS NOTES
[facility] Daily Progress     Patient Name: Jaylan Alberto  PRECAUTIONS/RESTRICTIONS:HO right rotator cuff tear, OA, bicep tear, atrial fibullation HTN  Date: 1/10/2017  Visit #:2/12PTA visit #:  1  Referral Diagnosis:   Referring provider: Rossy Aguilera, MD  Visit Diagnosis:     ICD-10-CM    1. Right shoulder pain M25.511    2. Muscle weakness (generalized) M62.81    3. Decreased ROM of right shoulder M25.611    4. Abnormal posture R29.3    5. Shoulder impingement, right M75.41    6. History of rotator cuff tear Z87.39    7. Acute pain of right shoulder M25.511    8. Esophageal reflux K21.9          Assessment:   Complaint to HEP  Reports relief with US  Cues for posture needed    Goal Status: Ongoing  Pt. will demonstrate/verbalize independence in self-management of condition in : 6 weeks  Pt. will be independent with home exercise program in : 6 weeks  Pt. will decrease use of medication for pain for improved quality of life in : Discontinued  Pt. will have improved quality of sleep: with less pain;waking less times/night;in 6 weeks;Comment  Comment:: tolerating right SL for sleep with less pain, greater duration  Patient will decrease : SPADI score;by _ points;for improved quality of function;for improved quality of life;in 6 weeks  by ___ points: 10  Pt will: be able to abhijeet/doff UE garments with less pain and difficulty in 6 weeks  Pt will: be able to type with less pain and difficulty in 6 weeks    Plan / Patient Education:     Continue with POC  Progress ex as tolerated TB for ADD and IR    Subjective:     Pain Rating: c/o right shoulder pain ant and  lateral  Ex 2x/day      Objective:     Cues for posture   Today's Exercises: reviewed HEP  Added elbow flex and ER/IR with wand  Continued US to right A/P shoulder      Treatment Today 1/10/2017      TREATMENT MINUTES COMMENTS   Evaluation     Self-care/ Home management     Manual therapy     Neuromuscular Re-education     Therapeutic Activity      Therapeutic Exercises 10    Gait training     Modality__________________ 10 + 3 set up US to right shoulder A/P 2MHz 50% at 0.8wts/cm2              Total 23    Blank areas are intentional and mean the treatment did not include these items.       Mary Grace Farrell  1/10/2017

## 2021-06-08 NOTE — PROGRESS NOTES
[facility] Certification Request    January 5, 2017      Patient: Jaylan Alberto  MR Number: [id number]  YOB: 1940  Date of Visit: 1/5/2017      Dear Dr. Rossy Aguilera:    Thank you for this referral.   We are seeing Jaylan Alberto for Physical Therapy of right shoulder pain, loss of motion and strength.    Medicare and/or Medicaid requires physician review and approval of the treatment plan. Please review the plan of care and verify that you agree with the therapy plan of care by co-signing this note.      Plan of Care  Authorization / Certification Start Date: 01/05/17  Authorization / Certification End Date: 03/29/17  Authorization / Certification Number of Visits: 12  Communication with: Referral Source  Patient Related Instruction: Nature of Condition;Treatment plan and rationale;Self Care instruction;Basis of treatment;Posture;Precautions;Next steps;Expected outcome  Times per Week: 2  Number of Weeks: 6  Number of Visits: 12  Discharge Planning: Independent HEP and self-management of symptoms  Precautions / Restrictions : HO right rotator cuff tear, OA, Atrial Fibrillation, HTN  Therapeutic Exercise: ROM;Stretching;Strengthening  Neuromuscular Reeducation: posture  Manual Therapy: soft tissue mobilization  Modalities: TENS;ultrasound;electrical stimulation;cold pack;hot pack  Equipment: theraband;TENS unit    Goals:  Pt. will demonstrate/verbalize independence in self-management of condition in : 6 weeks  Pt. will be independent with home exercise program in : 6 weeks  Pt. will decrease use of medication for pain for improved quality of life in : Discontinued  Pt. will have improved quality of sleep: with less pain;waking less times/night;in 6 weeks;Comment  Comment:: tolerating right SL for sleep with less pain, greater duration  Patient will decrease : SPADI score;by _ points;for improved quality of function;for improved quality of life;in 6 weeks  by ___ points: 10  Pt will: be  able to abhijeet/doff UE garments with less pain and difficulty in 6 weeks  Pt will: be able to type with less pain and difficulty in 6 weeks      If you have any questions or concerns, please don't hesitate to call.    Sincerely,      Elizabeth Amador, PT        Physician recommendation:     ___ Follow therapist's recommendation        ___ Modify therapy      *Physician co-signature indicates they certify the need for these services furnished within this plan and while under their care.          [facility]   Shoulder Initial Evaluation    Patient Name: Jaylan Alberto  PRECAUTIONS/RESTRICTIONS:  HO right rotator cuff tear, OA, biceps tear, Atrial Fibrillation, HTN  Date of evaluation: 1/5/2017  Referral Diagnosis: Right Shoulder Pain  Referring provider: Rossy Aguilera MD  Visit Diagnosis:     ICD-10-CM    1. Muscle weakness (generalized) M62.81    2. Right shoulder pain M25.511    3. Decreased ROM of right shoulder M25.611    4. Abnormal posture R29.3    5. Shoulder impingement, right M75.41    6. History of rotator cuff tear Z87.39        Assessment:      Skilled PT is required to decrease pain, increase ROM, strength and function of right shoulder through modalities, manual therapy, exercises and education.  Pt. is appropriate for skilled PT intervention as outlined in the Plan of Care (POC).  Pt. is a good candidate for skilled PT services to improve pain levels and function.    Goals:  Pt. will demonstrate/verbalize independence in self-management of condition in : 6 weeks  Pt. will be independent with home exercise program in : 6 weeks  Pt. will decrease use of medication for pain for improved quality of life in : Discontinued  Pt. will have improved quality of sleep: with less pain;waking less times/night;in 6 weeks;Comment  Comment:: tolerating right SL for sleep with less pain, greater duration  Patient will decrease : SPADI score;by _ points;for improved quality of function;for improved  quality of life;in 6 weeks  by ___ points: 10  Pt will: be able to abhijeet/doff UE garments with less pain and difficulty in 6 weeks  Pt will: be able to type with less pain and difficulty in 6 weeks    Patient's expectations/goals are guarded due to previous rotator cuff tear from shoulder trauma and arthopathy.    Barriers to Learning or Achieving Goals:  Chronicity of the problem.  Co-morbidities or other medical factors.  OA       Plan / Patient Instructions:        Plan of Care:   Authorization / Certification Start Date: 01/05/17  Authorization / Certification End Date: 03/29/17  Authorization / Certification Number of Visits: 12  Communication with: Referral Source  Patient Related Instruction: Nature of Condition;Treatment plan and rationale;Self Care instruction;Basis of treatment;Posture;Precautions;Next steps;Expected outcome  Times per Week: 2  Number of Weeks: 6  Number of Visits: 12  Discharge Planning: Independent HEP and self-management of symptoms  Precautions / Restrictions : HO right rotator cuff tear, OA, Atrial Fibrillation, HTN  Therapeutic Exercise: ROM;Stretching;Strengthening  Neuromuscular Reeducation: posture  Manual Therapy: soft tissue mobilization  Modalities: TENS;ultrasound;electrical stimulation;cold pack;hot pack  Equipment: theraband;TENS unit    POC and pathology of condition were reviewed with patient.  Pt. is in agreement with the Plan of Care  A Home Exercise Program (HEP) was initiated today.  Pt. was instructed in exercises by PT and patient was given a handout with detailed instructions.    Plan for next visit: See 2x/week for 5 more visits then reassess needs.  Assess response to US and continue if beneficial and increase to 10 minutes, progress with wand ER/IR elbow flexion, active shoulder flex/scaption, theraband adduction/IR.  .   Subjective:       Social information:      Occupation:  Retired writer   Work Status:  Retired, freelance   Equipment Available: None    History  of Present Illness:    Jaylan Alberto is a 76 y.o. male who presents to therapy today with chief complaints of exacerbation of right shoulder pain ~12/5/2016 when he felt he strain shoulder lifting luggage into overhead airplane bin.  Symptom onset approx one day after incident.  He was able to continue his previously instructed HEP but decreased his weight to 1# and reduced some of the exercises.  The approximately 2 weeks ago he noting 2 red lines which later developed into a bruise right lateral arm approximately at the deltoid tuberosity level which he feels may have been from carrying groceries.  He denies UE numbness or tingling.  He was seen at [facility], [place] for 10 visits for right shoulder pain and RC tear from June-August 2016.  Pt demo's signs and sx consistent with further trauma to RC or biceps tendon..     Pain Rating current:  1  Pain rating at best: 1  Pain rating at worst: 3  Pain description: sharp and weakness    Functional limitations are described as occurring with:   abhijeet/doff UE garments, right SL for sleep, typing for extended periods    Patient reports benefit from:  ibuprofen, lidocaine patches       Objective:      Note: Items left blank indicates the item was not performed or not indicated at the time of the evaluation.    Patient Outcome Measures :    Shoulder Pain and Disability Index (SPADI) in %: 13   Scores range from 0-100%, where a score of 0% represents minimal pain and maximal function. The minimal clincically important difference is a score reduction of 10%.    Shoulder Examination  1. Muscle weakness (generalized)     2. Right shoulder pain     3. Decreased ROM of right shoulder     4. Abnormal posture     5. Shoulder impingement, right     6. History of rotator cuff tear       Precautions/Restrictions: HO right rotator cuff tear, OA, biceps tear,    Involved side: Right    Posture Observation: observe ecchymosis mid lateral arm;Standing: C-protracted shoulders rounded,  increased thoracic kyphosis, left shoulder/scapula/iliac crest high, sway back,decreased L lordosis, protrude abdomen        Cervical Clearing: NA    Flexibility: NA    Palpation: Supraspinatus>subscapularis and infraspinatus, deltoid turberosity>lateral coracoid and bicipital groove.  Joint Assessment:  Sternoclavicular Joint Assessment: Not Indicated  Acromioclavicular Joint Assessment: Not Indicated  Scapulothoracic Joint Assessment: Hypomobile., Asymmetrical lowering.  Glenohumeral Joint Assessment:Hypomobile.    Shoulder/Elbow ROM   Date: 1/5/2017     Shoulder and Elbow ROM ( )   AROM in degrees AROM in degrees AROM in degrees    Right Left Right Left Right Left   Shoulder Flexion (0-180 ) 135 mild pain 130       Shoulder Abduction (0-180 ) 135 mild pain 120       Shoulder Extension (0-60 ) 65 very mild pain 70       Shoulder ER (0-90 ) 70 limited 83       Shoulder IR (0-70 ) L4 L3       Shoulder IR/Ext         Elbow Flexion (150 )         Elbow Extension (0 )          PROM in degrees PROM in degrees PROM in degrees    Right Left Right Left Right Left   Shoulder Flexion (0-180 ) 135 painful arc and at end range in flex and extension to 0o        Shoulder Abduction (0-180 ) 140 with end range pain        Shoulder Extension (0-60 )         Shoulder ER (0-90 ) WNL        Shoulder IR (0-70 ) 40 end range pain        Elbow Flexion (150 )         Elbow Extension (0 )           Shoulder/Elbow Strength Tested isometrically  Date: 1//5/2017     Shoulder/Elbow Strength (/5)  Manual Muscle Test (MMT) MMT MMT MMT    Right Left Right Left Right Left   Shoulder Flexion         Supraspinatus         Shoulder Abduction Mild pain        Shoulder Extension         Shoulder External Rotation 2+-3-        Shoulder Internal Rotation 5        Elbow Flexion 5        Elbow Extension 5        Other:adduction 4        Other:             Shoulder Special Tests     Impingement Cluster Right (+/-) Left (+/-) Rotator Cuff Tests Right (+/-)  Left (+/-)   [place]   Drop Arm Sign     Painful Arc +  Hornblowers     Infraspinatus Test   ERLS     AC Tests Right (+/-) Left (+/-) IRLS     Active Compression   Labral Tests Right (+/-) Left (+/-)   Crossbody Adduction   Biceps Load Test II     AC Resisted Extension   Jerk Test     GH Instability Tests Right (+/-) Left (+/-) [place] Test     Sulcus Sign   Biceps Tests Right (+/-) Left (+/-)   Apprehension   Speed     Relocation   Kirby hernandez     Other:   Other:     Other:   Other:       Today's HEP:  Exercises:  Exercise #1: Scapular  retraction, 3 minutes-H  Comment #1: Reverse codmans, 10x all motions-H  Exercise #2: Chest press with wand  then into flexion, 10x-H    Tolerated US and exercises well.    Treatment Today  TREATMENT MINUTES COMMENTS   Evaluation 30 Shoulder   Self-care/ Home management 5 Instructed when to CP and HP.  Advised HP at this time.   Manual therapy     Neuromuscular Re-education     Therapeutic Activity     Therapeutic Exercises 13 Brief review of all previously performed exercises for HEP, See flow sheet for current exercise performance.   Gait training     Modality_seated___US right AP shoulder and lateral arm to deltoid tuberosity______________ 8+3 set up 2MHZ, 50%, 0.8w/c2              Total 59    Blank areas are intentional and mean the treatment did not include these items.       PT Evaluation Code: (Please list factors)  Patient History/Comorbidities: previous HO rotator cuff tear  Examination: right shoulder  Clinical Presentation: stable  Clinical Decision Making: Low    Patient History/  Comorbidities Examination  (body structures and functions, activity limitations, and/or participation restrictions) Clinical Presentation Clinical Decision Making (Complexity)   No documented Comorbidities or personal factors 1-2 Elements Stable and/or uncomplicated Low   1-2 documented comorbidities or personal factor 3 Elements Evolving clinical presentation with changing characteristics  Moderate   3-4 documented comorbidities or personal factors 4 or more Unstable and unpredictable High              Elizabeth Amador  1/5/2017  2:28 PM

## 2021-06-08 NOTE — PROGRESS NOTES
[facility] Daily Progress     Patient Name: Jaylan Alberto  PRECAUTIONS/RESTRICTIONS:HO right rotator cuff tear, OA, bicep tear, atrial fibullation HTN  Date: 1/19/2017  Visit #:5/12  PTA visit #:  3  Referral Diagnosis: Right Shoulder Pain  Referring provider: Rossy Aguilera MD  Visit Diagnosis:     ICD-10-CM    1. Acute pain of right shoulder M25.511    2. History of rotator cuff tear Z87.39    3. Muscle weakness (generalized) M62.81    4. Decreased ROM of right shoulder M25.611    5. Abnormal posture R29.3    6. Shoulder impingement, right M75.41          Assessment:     Complaint to HEP  Reports relief with US, tolerating limited exercises.  Ready to progress with strength program.  Posture remains poor, kyphotic.  Meeting Expectations    Goal Status:   Pt. will demonstrate/verbalize independence in self-management of condition in : Progressing toward  Pt. will be independent with home exercise program in : Progressing toward  Pt. will decrease use of medication for pain for improved quality of life in : Progressing toward  Pt. will have improved quality of sleep: Progressing toward (increased tolerance for right SL for sleep)  Comment:: tolerating right SL for sleep with less pain, greater duration  Patient will decrease : SPADI score;by _ points;for improved quality of function;for improved quality of life;in 6 weeks  by ___ points: 10  Pt will: be able to abhijeet/doff UE garments with less pain and difficulty in 6 weeks-progress toward goal  Pt will: be able to type with less pain and difficulty in 6 weeks-progress toward goal    Plan / Patient Education:     Continue with POC  Progress ex as tolerated .  Patient will be out of town for approximately one month.  He will return to clinic upon returning from out of town.  Continue US /CFM/STM as needed.  Add PNF D2 flex, empty can, progress to blue ex band for IR/ Adduction, add anti-gravity scaption, horizontal abduction and chair push-ups.  He  will bring completed SPADI to next appointment.    Subjective:     Pain Ratin/10 with c/o right shoulder pain ant and lateral discomfort    Increased tolerance for sleep at right SL  Pain is worst nocturnally.      Objective:   Tenderness supraspinatus/lateral shoulder on right      Shoulder/Elbow ROM             Date: 2017      Shoulder and Elbow ROM ( ) AROM in degrees  AROM in degrees AROM in degrees    Right  Left Right Left Right Left   Shoulder Flexion (0-180 ) 135 mild pain  130 140 mile soreness         Shoulder Abduction (0-180 ) 135 mild pain  120 140 mild pain         Shoulder Extension (0-60 ) 65 very mild pain  70 70         Shoulder ER (0-90 ) 70 limited  83  60         Shoulder IR (0-70 ) L4  L3  L4  Mild pain         Shoulder IR/Ext                Elbow Flexion (150 )                Elbow Extension (0 )                  PROM in degrees  PROM in degrees PROM in degrees     Right  Left Right Left Right Left   Shoulder Flexion (0-180 ) 135 painful arc and at end range in flex and extension to 0o              Shoulder Abduction (0-180 ) 140 with end range pain              Shoulder Extension (0-60 )                Shoulder ER (0-90 ) WNL              Shoulder IR (0-70 ) 40 end range pain              Elbow Flexion (150 )                Elbow Extension (0 )                   Shoulder/Elbow Strength Tested isometrically            Date: 2017      Shoulder/Elbow Strength (/5)  Manual Muscle Test (MMT) MMT  MMT MMT    Right  Left Right Left Right Left   Shoulder Flexion      4          Supraspinatus                Shoulder Abduction Mild pain    3+ pain          Shoulder Extension                Shoulder External Rotation 2+-3-     pain 2+-3-          Shoulder Internal Rotation 5    5          Elbow Flexion 5    5         Elbow Extension 5    5          Other:adduction 4     4         Other:                   Complete HEP:  Exercises:  Exercise #1: Scapular  retraction, 3  "minutes-H  Comment #1: Reverse codmans, 10x all motions-DC  Exercise #2: Chest press with wand  then into flexion, 10x-DC  Comment #2: elbow flex, shoulder IR/ER with wand 10x each-H  Exercise #3: IR/ADD with OTB 10x -H  Comment #3: pulleys flex/scaption  Exercise #4: Row with green ex band, resume 10x5\" , to fatigue-H  Comment #4: Pull down with green banc, resume 5\"x10 to fatigue-H  Exercise #5: Standing wand flex overhead, 5x-H  Comment #5: Standing flex 2#x10 to fatigue-H  Exercise #6: Forward bent shoulder extension 2#, 10x to fatigue-H  Comment #6: Left SL, right ER 25x, to fatigue-H  Exercise #7: Left SL, right shouldler abduction to 90o,  10x-H  Comment #7: Wand ER 10x-H  Exercise #8: Self-assisted IR, 5x-H  Comment #8: Wall push-ups with fists on wall, 5x-H        Treatment Today 1/19/2017      TREATMENT MINUTES COMMENTS   Evaluation     Self-care/ Home management     Manual therapy  CFM/STM to ant lat shoulder   Neuromuscular Re-education     Therapeutic Activity     Therapeutic Exercises 30 See flow sheet   Gait training     Modality__________________  US to right shoulder A/P 2MHz 50% at 0.8wts/cm2              Total 30    Blank areas are intentional and mean the treatment did not include these items.       Elizabeth L Perry  1/19/2017      [facility] Discharge Summary  Patient Name: Jaylan Alberto  Date: 5/4/2017  Referral Diagnosis:  Right Shoulder Pain  Referring provider: Rossy Aguilera MD  Visit Diagnosis:   1. Acute pain of right shoulder     2. History of rotator cuff tear     3. Muscle weakness (generalized)     4. Decreased ROM of right shoulder     5. Abnormal posture     6. Shoulder impingement, right         Goal Status:   Pt. will demonstrate/verbalize independence in self-management of condition in : Progressing toward  Pt. will be independent with home exercise program in : Progressing toward  Pt. will decrease use of medication for pain for improved quality of life in : " Progressing toward  Pt. will have improved quality of sleep: Progressing toward (increased tolerance for right SL for sleep)  Comment:: tolerating right SL for sleep with less pain, greater duration  Patient will decrease : SPADI score;by _ points;for improved quality of function;for improved quality of life;in 6 weeks  by ___ points: 10  Pt will: be able to abhijeet/doff UE garments with less pain and difficulty in 6 weeks-progress toward goal  Pt will: be able to type with less pain and difficulty in 6 weeks-progress toward goal    Patient was seen for 5 visits from 1/5/2017 to 1/19/2017 with 0 missed appointments.  The patient made progress toward goals and has demonstrated understanding of and independence in the home program for self-care, and progression to next steps.  He will initiate contact if questions or concerns arise.  Patient received a home program Shoulder ROM, shoulder/scapular strength;  theraband was issued and instructed in use.  G-codes status quo from initial assessment using pain scale for measure as patient did not return with SPADI completed.  The patient discontinued therapy, did not return.  No further therapy is required at this time.    Therapy will be discontinued at this time.  The patient will need a new referral to resume.    Thank you for your referral.  Elizabeth Amador  5/4/2017  7:10 AM

## 2021-06-08 NOTE — PROGRESS NOTES
[facility] Daily Progress     Patient Name: Jaylan Alberto  PRECAUTIONS/RESTRICTIONS:HO right rotator cuff tear, OA, bicep tear, atrial fibullation HTN  Date: 1/17/2017  Visit #:4/  PTA visit #:  3  Referral Diagnosis:   Referring provider: Rossy gAuilera, MD  Visit Diagnosis:     ICD-10-CM    1. Right shoulder pain M25.511    2. Muscle weakness (generalized) M62.81    3. Decreased ROM of right shoulder M25.611    4. Abnormal posture R29.3    5. Shoulder impingement, right M75.41    6. History of rotator cuff tear Z87.39    7. Acute pain of right shoulder M25.511    8. Esophageal reflux K21.9          Assessment:   Complaint to HEP  Reports relief with US  Cues for posture needed    Goal Status: Ongoing  Pt. will demonstrate/verbalize independence in self-management of condition in : 6 weeks  Pt. will be independent with home exercise program in : 6 weeks  Pt. will decrease use of medication for pain for improved quality of life in : Discontinued  Pt. will have improved quality of sleep: with less pain;waking less times/night;in 6 weeks;Comment  Comment:: tolerating right SL for sleep with less pain, greater duration  Patient will decrease : SPADI score;by _ points;for improved quality of function;for improved quality of life;in 6 weeks  by ___ points: 10  Pt will: be able to abhijeet/doff UE garments with less pain and difficulty in 6 weeks  Pt will: be able to type with less pain and difficulty in 6 weeks    Plan / Patient Education:     Continue with POC  Progress ex as tolerated   Continue US /CFM/STM as needed    Subjective:     Pain Rating: c/o right shoulder pain ant and lateral discomfort  2/10  Pt reports going out of town for month of Feb      Objective:   Tenderness supraspinatus/lateral shoulder on right  CFM and US to right shoulder  Briefly reviewed HEP      Treatment Today 1/17/2017      TREATMENT MINUTES COMMENTS   Evaluation     Self-care/ Home management     Manual therapy 10 CFM/STM to  ant lat shoulder   Neuromuscular Re-education     Therapeutic Activity     Therapeutic Exercises     Gait training     Modality__________________ 10 + 3 set up US to right shoulder A/P 2MHz 50% at 0.8wts/cm2              Total 23    Blank areas are intentional and mean the treatment did not include these items.       Mary Grace Farrell  1/17/2017

## 2021-06-09 NOTE — PROGRESS NOTES
1940  [phone] (home)  [phone] (mobile)    +++Important patient information for [facility]/Cath Lab staff : PT IS ON AMIODARONE AND ELIQUIS+++    [facility] EP Cath Lab Procedure Order   Cardioversion:    Cardioversion     PT IS ON ELIQUIS AND NO DOSES MISSED      Diagnosis:  AF  Anticipated Case Duration:  Standard  Scheduling Needs/Timeframe:  PT IS SET FOR WED 3/22/2017 AT 12 30    Current Device: None None  Device Company/Device Rep Needed for Procedure: None    Anesthesia:  General-CV Only  Research Protocol:  No    [facility] EP Cath Lab Prep   Ordering Provider: Gato Pickens NP  Ordering Date: 3/8/2017  Orders Status: Intial order placed and Order set placed  EP NC Contact: Natalie Reza LPN    H&P:  Compled by GATO PICKENS CNP on 3/8/2017  PCP: Rossy Aguilera MD, [phone]    Pre-op Labs: N/A for procedure    Medical Records Pertinent for Procedure:  N/A    Patient Education:    PT HAS A  FOR PROCEDURE  PT INSTRUCTED TO HOLD ANY VITAMINS, MINERALS, CALCIUM, IRON OR SUPPLEMENTS THE MORNING OF CV  PT IS ON AMIODARONE WITH A RECENT INCREASE FOR 2 WEEKS  PT IS ON ELIQUIS AND NO DOSES MISSED  PT IS TO HAVE A FOLLOW UP WITH GATO IN 4 WEEKS AND SCHEDULERS ARE AWARE.    Teach with Patient: Completed via phone on 3/15/2017    Risks Reviewed:     Cardioversion    >90% acute success rate, <10% failure to convert or   reverts shortly after cardioversion.    <1% embolic event of (CVA, pulmonary embolism, or   other site).    75% risk for superficial burn.  Risks associated with general anesthesia will be addressed by the Anesthesiology Department    Consent: Will be obtained in [facility] day of procedure    Pre-Procedure Instructions that were Reviewed with Patient:  NPO after midnight, Notified patient of time and date of procedure by CV , Transportation arrangements needed s/p procedure, Post-procedure follow up process and Sedation plan/orders    Pre-Procedure Medication Instructions:  Instructions given  to pt regarding anticoagulants: Eliquis- instructed to continue anticoagulation uninterrupted through their procedure  Instructions given to pt regarding antiarrhythmic medication: Amiodarone; Pt instructed to continue medication prior to procedure  Instructions for medication, other than anticoagulants/antiarrhythmics listed above, given to pt: to take all morning medications with small sips of water, with the exception of OTC supplements and MVI    Allergies   Allergen Reactions     Chocolate Other (See Comments)     sneezing       Current Outpatient Prescriptions:      acetaminophen (TYLENOL) 325 MG tablet, Take 650 mg by mouth every 6 (six) hours as needed for pain., Disp: , Rfl:      albuterol (PROVENTIL HFA;VENTOLIN HFA) 90 mcg/actuation inhaler, Inhale 2 puffs every 4 (four) hours as needed for wheezing., Disp: 1 Inhaler, Rfl: 0     amiodarone (PACERONE) 200 MG tablet, Increase to 1 tab orally twice a day for 2 weeks and then decrease back to 1 tab orally every day., Disp: 90 tablet, Rfl: 6     amLODIPine (NORVASC) 10 MG tablet, Take 1 tablet (10 mg total) by mouth daily., Disp: 90 tablet, Rfl: 11     diltiazem (CARDIZEM) 30 MG tablet, TAKE 1 TAB BY MOUTH IF FASTER HEART RATE CAUSING SYMPTOMS. LIMIT 4 HOURS APART AND ONLY 2 PER DAY., Disp: 60 tablet, Rfl: 0     ELIQUIS 5 mg Tab tablet, TAKE 1 TABLET (5 MG TOTAL) BY MOUTH 2 (TWO) TIMES A DAY., Disp: 180 tablet, Rfl: 3     ketoconazole (NIZORAL) 2 % cream, Apply 1 application topically 2 (two) times a day as needed. , Disp: , Rfl:      losartan (COZAAR) 100 MG tablet, TAKE 1 TABLET BY MOUTH DAILY, Disp: 90 tablet, Rfl: prn     omeprazole (PRILOSEC) 20 MG capsule, Take 20 mg by mouth daily with supper. , Disp: , Rfl:      oxyCODONE-acetaminophen (PERCOCET) 5-325 mg per tablet, , Disp: , Rfl:      polyvinyl alcohol (LIQUIFILM TEARS) 1.4 % ophthalmic solution, Administer 1 drop to both eyes as needed for dry eyes., Disp: , Rfl:      sildenafil (VIAGRA) 50 MG  tablet, Take 50 mg by mouth as needed. TAKE AS DIRECTED , Disp: , Rfl:      sulfamethoxazole-trimethoprim (SEPTRA DS) 800-160 mg per tablet, , Disp: , Rfl:      VOLTAREN 1 % Gel, APPLY 2-4 GRAMS TO AFFECTED AREAS INDICATED IN DIRECTIONS, Disp: 100 g, Rfl: 1

## 2021-06-09 NOTE — PROGRESS NOTES
[facility] HEART [facility]   Arrhythmia Clinic    Assessment/Plan:  Diagnoses and all orders for this visit:    Persistent atrial fibrillation with recurrence about 4 months after last cardioversion.  He is fairly new on amiodarone.  There is no particular trigger for reoccurrence.  He has not had Amio level and may help guide whether his level is  slightly low and could be reason why he went out of rhythm.  He and his wife discussed the situation with Dr. Dailey and they want to do ablation.  They are here to discuss this today as well.  Due to the fact that he will not be able to have ablation until end of May or likely June, I recommended that he increase amiodarone to 200 mg p.o. twice daily for 2 weeks and then would schedule for cardioversion.  See after visit summary for details regarding prep for cardioversion.  Would like to try to maintain sinus rhythm in prep for PVI as much more likely to maintain sinus rhythm afterwards.  If he is able to maintain sinus rhythm after cardioversion, I would recommend discontinuing amiodarone 3 weeks prior to PVI.  This will need to be approved by Dr. Dailey.  I discussed likely longer procedure with PVI.  When he was in [place] in the emergency room on 2/21, he was noted to be in atrial flutter.  I have had him sign for release of EKG records so Dr. Dailey could review.  He was much more symptomatic though heart rates were fairly well-controlled at the time of this ER visit.  He had 2 times during the last month when he was much more symptomatic for a period of time and questioning if switching between atrial fib and flutter with change in symptoms.    Discussed  pulmonary vein procedure in detail and answered multiple questions.  Discussed waiting list, purpose to get off antiarrhythmics but not necessarily anticoagulation if on it for high risk or other reasons and 30% chance of repeat procedure.  Discussed risk of procedure as but not limited to the following and <1% risk of  each:  cardiac perforation, (tamponade),  embolic events like CVA or MI   death  esophageal fistula   phrenic nerve paralysis    I discussed need for pretesting of Cardiac MR or CT one month before ablation and KAY within a few days of PVI.  I discussed overnight stay in hospital with PVI and activity restrictions after.     After discussion, Jaylan IVEY Dolly wants to do PVI with Dr. Dailey.    -     amiodarone (PACERONE) 200 MG tablet; Increase to 1 tab orally twice a day for 2 weeks and then decrease back to 1 tab orally every day.  Dispense: 90 tablet; Refill: 6  -     EP Cardioversion External; Future; Expected date: 3/8/17    Essential hypertension with goal blood pressure less than 140/90 and well-controlled.    Long term use of drug  -     Thyroid Stimulating Hormone (TSH)  -     Hepatic function panel  -     Amiodarone (Cordarone )    Other orders  -     sulfamethoxazole-trimethoprim (SEPTRA DS) 800-160 mg per tablet;   -     oxyCODONE-acetaminophen (PERCOCET) 5-325 mg per tablet;   -     Vital signs; Standing  -     Oxygen nasal cannula; Standing  -     Inpatient consult to Anesthesiology Reason for Consult? Cardioversion; Did you contact the consulting MD? No; Consult priority: Today (urgent); Communication for MD: No phone communication necessary for now; Standing  -     Insert and maintain IV; Standing  -     lidocaine 10 mg/mL (1 %) injection 0.1-0.3 mL; Inject 0.1-0.3 mL under the skin as needed (for IV insertion).  -     sodium chloride 0.9%; Infuse 25 mL/hr into a venous catheter continuous.  -     Saline lock IV; Standing  -     sodium chloride 0.9 % flush 3 mL (NS); Infuse 3 mL into a venous catheter Line Care.  -     Verify informed consent for Elective Cardioversion; Standing  -     NPO 8 hours prior to procedure; Standing  -     Notify Anesthesiologist -; Standing  -     Emergency equipment at bedside; Standing    Over 40 minutes were spent in face-to-face counseling regarding persistent atrial fib  and options for treatment.  ______________________________________________________________________    Subjective:    I had the opportunity to see Jaylan Alberto at the [facility]. Jaylan Alberto is a 77 y.o. male and urgent EP appointment as he has been in persistent atrial fib since February 10.  They came back from [place] early as Abrahan does not feel well in atrial fib.  His heart rate has been better controlled on amiodarone than it was on either flecainide or Tikosyn in the past.  He has a tendency towards bradycardia in sinus rhythm.   Jaylan Alberto has a known history of persistent atrial fib and failed flecainide and so was admitted in mid- August for Tikosyn start. Unfortunately Abrahan went back into A. Fib 5 days after discharge from the hospital with Tikosyn start  so I discontinued it and he was only on half dose due to prolonged QT. [place] then [place] and used metoprolol only for heart rate control.  I then saw him at the end of September and loaded him on amiodarone he had cardioversion in October and has maintained sinus rhythm since then.  He was doing aggressive biking up a hill when he went out of rhythm.  When he went into A. fib he is remained in this.  He is previously had 2 episodes of heart failure and both were likely tachycardia mediated.  His heart rate seems well controlled.  He was diagnosed with COPD but PFTs show normal DLCO and fairly normal lung volumes.  This was discussed today.  He is overdue for Amio labs.  These were drawn at the end of the visit.  He denies missing any Eliquis to in the last 3 weeks.  ______________________________________________________________________    Problem List:  Patient Active Problem List   Diagnosis     Basal Cell Carcinoma Of The Skin Of The Face     Prostate Cancer     Hyperlipidemia     Essential hypertension     Male Erectile Disorder     Esophageal Reflux     Osteoarthritis     History of snoring     Persistent atrial  fibrillation     Medical History:  Past Medical History:   Diagnosis Date     Arthritis     Generalized     Atrial fibrillation      Cancer     Prostate and skin     CHF (congestive heart failure)     PMH     GERD (gastroesophageal reflux disease)      Hyperlipidemia      Hypertension      Surgical History:  Past Surgical History:   Procedure Laterality Date     CARDIAC CATHETERIZATION  Nov 2014     CARDIOVERSION  10/10/2016     MA CARDIOVERSION ELECTIVE ARRHYTHMIA EXTERNAL  08/17/2016    Description: Elective Cardioversion External;  Recorded: 04/25/2014;  Comments: 11/13     MA CORRJ HALLUX VALGUS W/SESMDC W/RESCJ PROX PHAL      Description: Bunion Correction By Cheilectomy;  Recorded: 06/29/2011;     MA EXCIS TENDON SHEATH LESION, HAND/FINGER      Description: Hand Excision Of A Tendon Cyst;  Recorded: 06/29/2011;  Comments: thumb     MA EXCISION MULTIPLE EXTERNAL PAPILLAE/TAGS ANUS      Description: Hemorrhoidectomy Excision Of External Tags;  Recorded: 06/16/2011;     MA REMOVAL OF TONSILS,<11 Y/O      Description: Tonsillectomy;  Recorded: 06/16/2011;     MA REPAIR OF HAMMERTOE,ONE      Description: Arthroplasty For Hammertoe;  Recorded: 06/16/2011;     MA TRANSURETHRAL ELEC-SURG PROSTATECTOM      Description: Transurethral Resection Of Prostate (TURP);  Recorded: 06/29/2011;  Comments: Dr. Lopez 2007 with small foci of Pr CA present     Social History:  Social History   Substance Use Topics     Smoking status: Former Smoker     Packs/day: 1.00     Years: 30.00     Quit date: 1/14/1983     Smokeless tobacco: Never Used     Alcohol use 0.6 oz/week     1 Glasses of wine per week        Review of Systems: Review of Systems:   General: WNL  Eyes: WNL  Ears/Nose/Throat: WNL  Lungs: Shortness of Breath  Heart: WNL  Stomach: WNL  Bladder: Frequent Urination at Night  Muscle/Joints: WNL  Skin: WNL  Nervous System: WNL  Mental Health: WNL     Blood: WNL      Family History:  Family History   Problem Relation Age of  Onset     Stroke Mother       age 68     Depression Father       of suicide age 73     Atrial fibrillation Brother          Allergies:  Allergies   Allergen Reactions     Chocolate Other (See Comments)     sneezing     Medications:  Current Outpatient Prescriptions   Medication Sig Dispense Refill     acetaminophen (TYLENOL) 325 MG tablet Take 650 mg by mouth every 6 (six) hours as needed for pain.       albuterol (PROVENTIL HFA;VENTOLIN HFA) 90 mcg/actuation inhaler Inhale 2 puffs every 4 (four) hours as needed for wheezing. 1 Inhaler 0     amiodarone (PACERONE) 200 MG tablet Increase to 1 tab orally twice a day for 2 weeks and then decrease back to 1 tab orally every day. 90 tablet 6     amLODIPine (NORVASC) 10 MG tablet Take 1 tablet (10 mg total) by mouth daily. 90 tablet 11     diltiazem (CARDIZEM) 30 MG tablet TAKE 1 TAB BY MOUTH IF FASTER HEART RATE CAUSING SYMPTOMS. LIMIT 4 HOURS APART AND ONLY 2 PER DAY. 60 tablet 0     ELIQUIS 5 mg Tab tablet TAKE 1 TABLET (5 MG TOTAL) BY MOUTH 2 (TWO) TIMES A DAY. 180 tablet 3     ketoconazole (NIZORAL) 2 % cream Apply 1 application topically 2 (two) times a day as needed.        losartan (COZAAR) 100 MG tablet TAKE 1 TABLET BY MOUTH DAILY 90 tablet prn     omeprazole (PRILOSEC) 20 MG capsule Take 20 mg by mouth daily with supper.        oxyCODONE-acetaminophen (PERCOCET) 5-325 mg per tablet        polyvinyl alcohol (LIQUIFILM TEARS) 1.4 % ophthalmic solution Administer 1 drop to both eyes as needed for dry eyes.       sildenafil (VIAGRA) 50 MG tablet Take 50 mg by mouth as needed. TAKE AS DIRECTED        sulfamethoxazole-trimethoprim (SEPTRA DS) 800-160 mg per tablet        VOLTAREN 1 % Gel APPLY 2-4 GRAMS TO AFFECTED AREAS INDICATED IN DIRECTIONS 100 g 1     No current facility-administered medications for this visit.        Objective:   Vital signs:  Visit Vitals     /66 (Patient Site: Left Arm, Patient Position: Sitting, Cuff Size: Adult Regular)      "Pulse 72     Ht 5' 9\" (1.753 m)     Wt 207 lb 6.4 oz (94.1 kg)     SpO2 98%     BMI 30.63 kg/m2         Physical Exam:    GENERAL APPEARANCE: Alert, cooperative and in no acute distress.  HEENT: No scleral icterus. No Xanthelasma. Oral mucuos membranes pink and moist.  NECK: JVP Nl.  CHEST: clear to auscultation  CARDIOVASCULAR: S1, S2 without murmur ,clicks or rubs. Regular, regular.  Radial and posterior tibial pulses are intact and symetric.   EXTREMITIES: No cyanosis, clubbing or edema.    Results personally reviewed:  Results for orders placed during the hospital encounter of 03/03/17   Echo Complete [ECH10] 03/03/2017    Narrative   When compared to the previous study dated 11/24/2013, there is no   significant change.    Left ventricle ejection fraction is normal. The calculated left   ventricular ejection fraction is 59%.    Normal right ventricular size and function.    Mild aortic valve regurgitation.         11/13 nuclear stress test shows EF 65%. Small sized mild intensity reversiblity in inferior wall. This could be ischemia or diaphragmatic attenuation. He is low risk for cardiac event.  September 2015 24-hour Holter shows sinus bradycardia with average heart rate of 48 and minimum heart rate of 36 and maximum heart rate of 88. He had several short nonsustained atrial tachycardic episodes and one episode precipitated paroxysmal atrial fibrillation. Metoprolol succinate was decreased from 100 mg to 25 mg daily.     Results for orders placed or performed in visit on 11/18/16   ECG Clinic - Today   Result Value Ref Range    SYSTOLIC BLOOD PRESSURE  mmHg    DIASTOLIC BLOOD PRESSURE  mmHg    VENTRICULAR RATE 57 BPM    ATRIAL RATE 57 BPM    P-R INTERVAL 230 ms    QRS DURATION 114 ms    Q-T INTERVAL 462 ms    QTC CALCULATION (BEZET) 449 ms    P Axis 56 degrees    R AXIS -6 degrees    T AXIS 46 degrees    MUSE DIAGNOSIS       Sinus bradycardia with 1st degree A-V block  Otherwise normal ECG  When compared with " ECG of 13-OCT-2016 01:42,  No significant change was found  Confirmed by GUS AUGUSTIN MD LOC:JN ([id number]) on 11/18/2016 5:22:38 PM       TSH:   Lab Results   Component Value Date    TSH 1.95 03/08/2017     BNP:   Lab Results   Component Value Date     (H) 10/13/2016     BMP:  Lab Results   Component Value Date    CREATININE 1.00 11/29/2016    BUN 13 11/29/2016     11/29/2016    K 4.3 11/29/2016     11/29/2016    CO2 29 11/29/2016       This note has been dictated using voice recognition software. Any grammatical or context distortions are unintentional and inherent to the software.    FABIEN PICKENS RN, Count includes the [facility]  [phone]

## 2021-06-09 NOTE — ANESTHESIA PREPROCEDURE EVALUATION
Anesthesia Evaluation      Patient summary reviewed     Airway   Mallampati: II  Neck ROM: full   Pulmonary                           Cardiovascular   (+) dysrhythmias, ,     Rhythm: irregular        Neuro/Psych - negative ROS     Endo/Other - negative ROS      GI/Hepatic/Renal    (+) GERD,             Dental - normal exam                        Anesthesia Plan  Planned anesthetic: general mask    ASA 3   Induction: intravenous   Anesthetic plan and risks discussed with: patient    Post-op plan: routine recovery

## 2021-06-09 NOTE — PROGRESS NOTES
[id number]  [phone] (home)  [phone] (mobile)    +++Important patient information for CSC/Cath Lab staff : None+++    [facility] EP Cath Lab Procedure Order   Ablation Type:  PVI- Atrial Fibrillation  Specific location of pathway: Left    Diagnosis:  AF  Anticipated Case Duration:  6  Scheduling Needs/Timeframe:  Next Available    MD Preference: Dr Asim MONTIEL Assist:  No Specific MD:  N/A    Current Device: None None  Device Company/Device Rep Needed for Procedure: None    Pre-Procedural Testing needed: KAY and CT  Mapping System Required:  AMANDO  ICE Needed:  Yes  Anesthesia:  General-Whole Case  Research Protocol:  No    [facility] EP Cath Lab Prep   Ordering Provider: Dr Dailey  Ordering Date: 3/9/2017  Orders Status: Intial order placed and Order set placed  EP NC Contact: Kelley Taylor RN    H&P:  EP NP to complete within 1 wk prior to scheduled PVI  PCP: Rossy Aguilera MD, [phone]    Pre-op Labs: Done within 7 days    Medical Records Pertinent for Procedure:  Holter 3-9-16 Holter SB/SND, Echo 3-3-17 EF 59% and EKG 11-18-16 SB 1wAVB @57,   9-29-16 Afib,   12-6-03 SB @48, Cardioversion 3-?-17, 8-17-16, 10-10-16,  1-14-15,  12-24-14    Patient Education:    Teach with Patient: Teach with pt completed same day as pre-op H&P-see additional clinic note    Risks Reviewed:     Pulmonary Vein/AF/Radiofrequency Ablation  In addition to standard risks for Radiofrequency Ablation, there is:    <2% for significant pulmonary vein stenosis    <2% risk for embolic events    <1% risk for esophageal fistula    <1% risk death    Pulmonary Vein Isolation / Cryoablation Risks:    1-2% risk for phrenic nerve paralysis    <1% risk for pulmonary vein stenosis    Risk of esophageal irritation with no incidence of atrial-esophageal fistula    Rare cryoballoon rupture  <1% risk death       Cardiac Catheter Ablation    <1% risk for the following: hypotension, hemorrhage, vascular injury including perforation of vein, artery or heart,  thrombophlebitis, systemic or pulmonic emboli; cardiac perforation, (tamponade), infection, pneumothorax, arrhythmias, proarrhythmic effects of drugs, radiation exposure.    1-2% complete heart block (for AVNRT or septol accessory pathway).    <0.5% CVA or MI    <0.1% death    If external defibrillation is needed, 75% risk for superficial burn.    1-2% tamponade and aortic puncture with left sided transeptal approach for left side AMANDO - increase risk of CVA to <2%.  Late arrhythmia recurrences depends upon the primary rhythm disturbance.    Consent: Obtained in clinic by NC prior to procedure    Pre-Procedure Instructions that were Reviewed with Patient:  NPO after midnight, Notified patient of time and date of procedure by CV , Transportation arrangements needed s/p procedure, Post-procedure follow up process, Sedation plan/orders and Pre-procedure letter was sent to pt by CV     Pre-Procedure Medication Instructions:  Instructions given to pt regarding anticoagulants: Eliquis- instructed to continue anticoagulation uninterrupted through their procedure  Instructions given to pt regarding antiarrhythmic medication: Amiodarone; Hold 3 weeks prior to PVI if maintaining SR  Instructions for medication, other than anticoagulants/antiarrhythmics listed above, given to pt: to take all morning medications with small sips of water, with the exception of OTC supplements and MVI am of KAY, hold all am medications with the exception of Eliquis am of procedure.    Allergies   Allergen Reactions     Chocolate Other (See Comments)     sneezing       Current Outpatient Prescriptions:      acetaminophen (TYLENOL) 325 MG tablet, Take 650 mg by mouth every 6 (six) hours as needed for pain., Disp: , Rfl:      albuterol (PROVENTIL HFA;VENTOLIN HFA) 90 mcg/actuation inhaler, Inhale 2 puffs every 4 (four) hours as needed for wheezing., Disp: 1 Inhaler, Rfl: 0     amiodarone (PACERONE) 200 MG tablet, Increase to 1 tab  orally twice a day for 2 weeks and then decrease back to 1 tab orally every day., Disp: 90 tablet, Rfl: 6     amLODIPine (NORVASC) 10 MG tablet, Take 1 tablet (10 mg total) by mouth daily., Disp: 90 tablet, Rfl: 11     diltiazem (CARDIZEM) 30 MG tablet, TAKE 1 TAB BY MOUTH IF FASTER HEART RATE CAUSING SYMPTOMS. LIMIT 4 HOURS APART AND ONLY 2 PER DAY., Disp: 60 tablet, Rfl: 0     ELIQUIS 5 mg Tab tablet, TAKE 1 TABLET (5 MG TOTAL) BY MOUTH 2 (TWO) TIMES A DAY., Disp: 180 tablet, Rfl: 3     ketoconazole (NIZORAL) 2 % cream, Apply 1 application topically 2 (two) times a day as needed. , Disp: , Rfl:      losartan (COZAAR) 100 MG tablet, TAKE 1 TABLET BY MOUTH DAILY, Disp: 90 tablet, Rfl: prn     omeprazole (PRILOSEC) 20 MG capsule, Take 20 mg by mouth daily with supper. , Disp: , Rfl:      oxyCODONE-acetaminophen (PERCOCET) 5-325 mg per tablet, , Disp: , Rfl:      polyvinyl alcohol (LIQUIFILM TEARS) 1.4 % ophthalmic solution, Administer 1 drop to both eyes as needed for dry eyes., Disp: , Rfl:      sildenafil (VIAGRA) 50 MG tablet, Take 50 mg by mouth as needed. TAKE AS DIRECTED , Disp: , Rfl:      sulfamethoxazole-trimethoprim (SEPTRA DS) 800-160 mg per tablet, , Disp: , Rfl:      VOLTAREN 1 % Gel, APPLY 2-4 GRAMS TO AFFECTED AREAS INDICATED IN DIRECTIONS, Disp: 100 g, Rfl: 1

## 2021-06-09 NOTE — ANESTHESIA CARE TRANSFER NOTE
Last vitals:   Vitals:    03/22/17 1255   Pulse: 98   Resp: 18   Temp: 36.9  C (98.4  F)   SpO2: 97%     Patient's level of consciousness is drowsy  Spontaneous respirations: yes  Maintains airway independently: yes  Dentition unchanged: yes  Oropharynx: oropharynx clear of all foreign objects    QCDR Measures:  ASA# 20 - Surgical Safety Checklist: ASA20A - Safety Checks Done  PQRS# 430 - Adult PONV Prevention: NA - Not adult patient, not GA or 3 or more risk factors NOT present  ASA# 8 - Peds PONV Prevention: NA - Not pediatric patient, not GA or 2 or more risk factors NOT present  PQRS# 424 - Aarti-op Temp Management: NA - MAC anesthesia or case < 60 minutes  PQRS# 426 - PACU Transfer Protocol:NA - Patient did not go to PACU  ASA# 14 - Acute Post-op Pain: NA - Patient under age 10y or did not go to PACU    I completed my SBAR handoff to the receiving nurse per policy and procedure.

## 2021-06-09 NOTE — ANESTHESIA POSTPROCEDURE EVALUATION
Patient: Jaylan Alberto  * No procedures listed *  Anesthesia type: general    Patient location: PACU  Last vitals:   Vitals:    03/22/17 1430   BP: 125/64   Pulse: 64   Resp: 25   Temp:    SpO2:      Post vital signs: stable  Level of consciousness: awake and responds to simple questions  Post-anesthesia pain: pain controlled  Post-anesthesia nausea and vomiting: no  Pulmonary: unassisted, return to baseline  Cardiovascular: stable and blood pressure at baseline  Hydration: adequate  Anesthetic events: no    QCDR Measures:  ASA# 11 - Aarti-op Cardiac Arrest: ASA11B - Patient did NOT experience unanticipated cardiac arrest  ASA# 12 - Aarti-op Mortality Rate: ASA12B - Patient did NOT die  ASA# 13 - PACU Re-Intubation Rate: ASA13B - Patient did NOT require a new airway mgmt  ASA# 10 - Composite Anes Safety: ASA10A - No serious adverse event  ASA# 38 - New Corneal Injury: ASA38A - No new exposure keratitis or corneal abrasion in PACU    Additional Notes:

## 2021-06-09 NOTE — PROGRESS NOTES
Subjective findings: The patient returned to the clinic today complaining   of long thick painful nails on both feet.   OBJECTIVE FINDINGS: Nails bilaterally are long, thick, discolored, and dystrophic 1 through 5   both feet. The patient has an incurvated medial border, both great toenails.   Skin bilaterally warm, supple, and intact. No skin lesions are noted. DP   and PT pulses plus 2/4 bilaterally. Capillary refill less than 2 seconds   bilaterally. Negative clonus. Negative Babinski bilaterally. Range of   motion within normal limits bilaterally. Muscle power plus 5/5 bilaterally   within all compartments.   ASSESSMENT:   1. Onychomycosis.   2. Onychauxis.   PLAN: I debrided nails 1 through 5 both feet today. I have recommended that   the patient return to clinic every 3 months for continued foot care.

## 2021-06-09 NOTE — PROGRESS NOTES
Pt was seen in clinic today Gato ordered CV after an amio bump.  Pt is aware, set up when ready and has my direct number.

## 2021-06-10 NOTE — PROGRESS NOTES
[facility]    Assessment/Plan:      Problem List Items Addressed This Visit     Essential hypertension    Persistent atrial fibrillation - Primary    Relevant Orders    Basic Metabolic Panel (Completed)    HM2(CBC w/o Differential) (Completed)    ECG 12 lead with MUSE (Completed)        1.  Persistent atrial fibrillation: Initially diagnosed 2013.  Symptoms consist of fatigue and decreased activity tolerance.  Failed antiarrhythmic therapy with flecainide, Tikosyn, and amiodarone.  He remains in atrial fibrillation with controlled ventricular response on amiodarone 200 mg daily.  He has a [id number] score of 3 for age >75 and hypertension and is on Eliquis 5 mg twice daily for stroke prophylaxis.    He missed a single dose of Eliquis about 3 weeks ago, but none since.  He has noted easy bruising, but no bleeding issues.   He is scheduled for transesophageal echo and pulmonary vein isolation ablation with Dr. Dailey on 5/26/2017.  We discussed the procedures, <1% risk for stroke, esophageal or myocardial perforation, phrenic nerve injury, or death, expected recovery, and follow-up.  His questions were answered to his satisfaction and he is ready to proceed.    2.  Hypertension: Blood pressure at target today.    Follow-up with Gato Araujo CNP in 6 weeks.  Follow-up with Dr. Dailey in 3 months.    Subjective:      Jaylan Alberto , a very pleasant 77-year-old gentleman, comes in today for history and physical prior to pulmonary vein isolation ablation.  He has a history of persistent atrial fibrillation since 2013 and has previously failed antiarrhythmic therapy with flecainide and Tikosyn in before being switched to amiodarone in September 2016.  However, he had recurrence in March 2017 despite a temporary increase in amiodarone had early recurrence following cardioversion.  Symptoms consist of fatigue and decreased activity tolerance.  He is on long-term anticoagulation with Eliquis 5 mg twice daily.  He  reports that about 3 weeks ago he missed a single dose, but none since.  He denies chest discomfort, palpitations, shortness of breath, paroxysmal nocturnal dyspnea, orthopnea, lightheadedness, dizziness, pre-syncope, or syncope.    Medical, surgical, family, social history, and medications were reviewed and updated as necessary.  He denies any previous adverse reactions to anesthesia or difficulty with Morton urinary catheter.    Patient Active Problem List   Diagnosis     Basal Cell Carcinoma Of The Skin Of The Face     Prostate Cancer     Hyperlipidemia     Essential hypertension     Male Erectile Disorder     Esophageal Reflux     Osteoarthritis     History of snoring     Persistent atrial fibrillation       Past Medical History:   Diagnosis Date     Arthritis     Generalized     Atrial fibrillation      Cancer     Prostate and skin     CHF (congestive heart failure)     PMH     GERD (gastroesophageal reflux disease)      Hyperlipidemia      Hypertension        Past Surgical History:   Procedure Laterality Date     CARDIAC CATHETERIZATION  Nov 2014     CARDIOVERSION      8/17/16, 10/10/16, 3/22/17     GA CORRJ HALLUX VALGUS W/SESMDC W/RESCJ PROX PHAL      Description: Bunion Correction By Cheilectomy;  Recorded: 06/29/2011;     GA EXCIS TENDON SHEATH LESION, HAND/FINGER      Description: Hand Excision Of A Tendon Cyst;  Recorded: 06/29/2011;  Comments: thumb     GA EXCISION MULTIPLE EXTERNAL PAPILLAE/TAGS ANUS      Description: Hemorrhoidectomy Excision Of External Tags;  Recorded: 06/16/2011;     GA REMOVAL OF TONSILS,<13 Y/O      Description: Tonsillectomy;  Recorded: 06/16/2011;     GA REPAIR OF HAMMERTOE,ONE      Description: Arthroplasty For Hammertoe;  Recorded: 06/16/2011;     GA TRANSURETHRAL ELEC-SURG PROSTATECTOM      Description: Transurethral Resection Of Prostate (TURP);  Recorded: 06/29/2011;  Comments: Dr. Lopez 2007 with small foci of Pr CA present       Allergies   Allergen Reactions     No  Known Drug Allergies        Current Outpatient Prescriptions   Medication Sig Dispense Refill     acetaminophen (TYLENOL) 325 MG tablet Take 650 mg by mouth every 6 (six) hours as needed for pain.       albuterol (PROVENTIL HFA;VENTOLIN HFA) 90 mcg/actuation inhaler Inhale 2 puffs every 4 (four) hours as needed for wheezing. 1 Inhaler 0     amiodarone (PACERONE) 200 MG tablet Take 1 tablet (200 mg total) by mouth daily. Increase to 1 tab orally twice a day for 2 weeks and then decrease back to 1 tab orally every day. 90 tablet 6     diltiazem (CARDIZEM CD) 120 MG 24 hr capsule Take 1 capsule (120 mg total) by mouth daily. 90 capsule 1     ELIQUIS 5 mg Tab tablet TAKE 1 TABLET (5 MG TOTAL) BY MOUTH 2 (TWO) TIMES A DAY. 180 tablet 3     ketoconazole (NIZORAL) 2 % cream Apply 1 application topically 2 (two) times a day as needed.        losartan (COZAAR) 100 MG tablet TAKE 1 TABLET BY MOUTH DAILY 90 tablet prn     omeprazole (PRILOSEC) 20 MG capsule Take 20 mg by mouth daily with supper.        polyvinyl alcohol (LIQUIFILM TEARS) 1.4 % ophthalmic solution Administer 1 drop to both eyes as needed for dry eyes.       sildenafil (VIAGRA) 50 MG tablet Take 50 mg by mouth as needed. TAKE AS DIRECTED        VOLTAREN 1 % Gel APPLY 2-4 GRAMS TO AFFECTED AREAS INDICATED IN DIRECTIONS 100 g 1     No current facility-administered medications for this visit.        Family History   Problem Relation Age of Onset     Stroke Mother       age 68     Depression Father       of suicide age 73     Atrial fibrillation Brother        Social History   Substance Use Topics     Smoking status: Former Smoker     Packs/day: 1.00     Years: 30.00     Quit date: 1983     Smokeless tobacco: Never Used     Alcohol use 0.6 oz/week     1 Glasses of wine per week       Review of Systems:   General: WNL  Eyes: WNL  Ears/Nose/Throat: WNL  Lungs: WNL  Heart: Irregular Heartbeat  Stomach: WNL  Bladder: WNL  Muscle/Joints: WNL  Skin:  "WNL  Nervous System: WNL  Mental Health: WNL     Blood: WNL      Objective:    /68 (Patient Site: Left Arm, Patient Position: Sitting, Cuff Size: Adult Large)  Pulse 80  Resp 18  Ht 5' 9\" (1.753 m)  Wt 202 lb (91.6 kg)  BMI 29.83 kg/m2    Physical Exam  General Appearance:  Alert, well-appearing, in no acute distress.     HEENT:  Atraumatic, normocephalic; no scleral icterus, EOM intact, PERRL; the mucous membranes were pink and moist; no obvious thyromegaly.   Chest: Chest symmetric, spine straight.     Lungs:   Respirations unlabored; the lungs are clear to auscultation.   Cardiovascular:   Auscultation reveals normal first and second heart sounds with no murmurs, rubs, or gallops.  Irregularly irregular rate and rhythm. No JVD.  Radial and posterior tibial pulses are intact.    Abdomen:  Soft, nontender, nondistended, bowel sounds present.     Extremities: No cyanosis, clubbing, or edema.   Musculoskeletal:  Moves all extremities.     Skin: No xanthelasma.   Neurologic: Mood and affect are appropriate. Oriented to person, place, time, and situation.       Cardiographics  EC17 was personally reviewed, shows atrial fibrillation with controlled ventricular response at 68 bpm    Echocardiogram: Done 3/3/17     When compared to the previous study dated 2013, there is no significant change.    Left ventricle ejection fraction is normal. The calculated left ventricular ejection fraction is 59%.    Normal right ventricular size and function.  Mild aortic valve regurgitation.    CT pulmonary veins: Done 17  Left superior pulmonary vein: 20.1 x 12.8mm, area 16.9sq cm.  Left inferior pulmonary vein: 20.5 x 20.3mm, area 32.5sq cm.  Right superior pulmonary vein: 22.2 x 23.1mm, area 36.3sq cm.  Right middle pulmonary vein: 12.5 x 11.4mm, area 11.3sq cm.   Right inferior pulmonary vein: 19.5 x 18mm, area 26.3sq cm.   Esophagus: The esophagus is adjacent to the posterior side of the left atrial " body.  Extracardiac findings:    1.  Hiatal hernia.    Lab Review   Results for orders placed or performed in visit on 05/22/17   Basic Metabolic Panel   Result Value Ref Range    Sodium 136 136 - 145 mmol/L    Potassium 3.9 3.5 - 5.0 mmol/L    Chloride 100 98 - 107 mmol/L    CO2 27 22 - 31 mmol/L    Anion Gap, Calculation 9 5 - 18 mmol/L    Glucose 53 (LL) 70 - 125 mg/dL    Calcium 9.2 8.5 - 10.5 mg/dL    BUN 10 8 - 28 mg/dL    Creatinine 0.88 0.70 - 1.30 mg/dL    GFR MDRD Af Amer >60 >60 mL/min/1.73m2    GFR MDRD Non Af Amer >60 >60 mL/min/1.73m2     Lab Results   Component Value Date    WBC 8.5 05/22/2017    HGB 14.9 05/22/2017    HCT 45.9 05/22/2017    MCV 99 05/22/2017     05/22/2017         Greater than 45 minutes were spent in this visit with more than 50% of the time discussing diagnoses, counseling, and coordination of care.      Andria Gastelum, [facility], Electrophysiology  [phone]    This note has been dictated using voice recognition software. Any grammatical, typographical, or context distortions are unintentional and inherent to the software.

## 2021-06-10 NOTE — PROGRESS NOTES
He has been on metoprolol in the past as well.  His wife is internal med doctor and may have preference.  Diltiazem 120 mg or metoprolol succinate 50 mg 1 qd are my suggestions.  I believe he has a preference.  One didn't work well to bring down HR when in [place] last summer is my recall.   Gato

## 2021-06-10 NOTE — PROGRESS NOTES
Reviewed Gato's suggestion with patient, he prefers diltiazem 120mg daily as metoprolol did not work for him in the past.   Will send diltiazem 120mg to pharmacy.  Patient to call with side effects or concerns.  JW

## 2021-06-10 NOTE — PROGRESS NOTES
[facility]   Knee Initial Evaluation    Patient Name: Jaylan Alberto  PRECAUTIONS/RESTRICTIONS:  Atrial Fibrillation, HTN, Knee OA  Date of evaluation: 5/4/2017  Referral Diagnosis: Bilateral Knee Pain  Referring provider: Parag Becker MD  Visit Diagnosis:     ICD-10-CM    1. Knee pain, bilateral M25.561     M25.562    2. Muscle weakness (generalized) M62.81    3. Decreased ROM of left knee M25.662    4. Heel cord tightness, unspecified laterality M67.00        Assessment:      Skilled PT is required to modulate pain, increase strength, ROM and function through manual therapy, modalities, exercise and education  Pt. is appropriate for skilled PT intervention as outlined in the Plan of Care (POC).  Pt. is a good candidate for skilled PT services to improve pain levels and function.    Goals:  Pt. will demonstrate/verbalize independence in self-management of condition in : 6 weeks  Pt. will be independent with home exercise program in : 6 weeks  Pt. will have improved quality of sleep: waking less times/night;with less pain;in 6 weeks  Comment:: tolerating SL for sleep with less pain in hip/knees  Patient will ascend / descend: stairs;with recipricol gait;independently;with railing;with less pain;with less difficulty;in 6 weeks  Patient will increase : LEFS score;for improved quality of function;for improved quality of life;by _ points;in 6 weeks  by ___ points: 9  Pt will: be able to get in and out of car with less discomfort, greater ease in 6 weeks  Pt will: be able to have less pain after sustained knee flexion as with sitting, in 6 weeks    Patient's expectations/goals are realistic.    Barriers to Learning or Achieving Goals:  Chronicity of the problem.  Co-morbidities or other medical factors.  atrial Fibrillation, OA bilat knees, left>right           Plan / Patient Instructions:      Plan of Care:   Authorization / Certification Start Date: 05/04/17  Authorization / Certification End Date:  07/20/17  Authorization / Certification Number of Visits: 12  Communication with: Referral Source  Patient Related Instruction: Nature of Condition;Treatment plan and rationale;Self Care instruction;Basis of treatment;Precautions;Next steps;Expected outcome  Times per Week: 1x, every 2 weeks  Number of Weeks: 24  Number of Visits: 12  Discharge Planning: Independent HEP and self-management of symptoms  Precautions / Restrictions : Atrial Fibrillation, HTN, Knee OA  Therapeutic Exercise: ROM;Stretching;Strengthening  Neuromuscular Reeducation: kinesio tape;balance/proprioception;core  Manual Therapy: myofascial release;joint mobilization  Modalities: TENS;electrical stimulation  Equipment: theraband;TENS unit    Plan for next visit: see 1x in 2 weeks to advance HEP to closed chain with lateral/forward step ups, closed  Chair HS SL, possibly with theraband performing SLR in flex/abduction, extension and Don Tiegny TKE.     Subjective:        History of Present Illness:    Jaylan is a 77 y.o. male who presents to therapy today with complaints of left>>right knee pain, swelling and weakness, recent onset 3 weeks ago noted upon getting into car the wrong way, feeling like he twisted his knee and causing pain into the left hip as well.  He had a cortisone injection to left knee on 4/24/2017 but has had 2 prior injections on the left and 2 on the right.  He reports having osteoarthritis, stage 3, for over 10 years.   He noted reduction in pain of over 50% since injection.  Edema is present on left, behind knee.  Signs and symptoms are consistent with osteoarthritis.    Pain Rating:  Current:  1-2  Worst:  4  With stair climbing before injection  Best:  1    Functional limitations:  Standing 15-20 minutes, walking 45 minutes, reciprocal stair climbing, SL for sleep due to hip pain and genuflection.    Patient reports benefit from:  injection:   type cortisone date(s)4/24/2017, and 2 others prior       Objective:      Note:  Items left blank indicates the item was not performed or not indicated at the time of the evaluation.    Patient Outcome Measures :    Lower Extremity Functional Scale (_/80): 56   Scores range from 0-80, where a score of 80 represents maximum function. The minimal clinically important difference is a positive change of 9 points.    Knee Examination  1. Knee pain, bilateral     2. Muscle weakness (generalized)     3. Decreased ROM of left knee     4. Heel cord tightness, unspecified laterality         Involved Side: left>right  Posture Observation: Standing:  Good knee extension, bilat pes planus      Assistive Device: None  Gait Observation: knees slightly bent, guarded chair transfers, no device  Lumbar Clearing: Does not provoke symptoms  Hip Clearing: Hip ROM  Date: 5/4/2017     Hip ROM( ) AROM in degrees AROM in degrees AROM in degrees    Right Left Right Left Right Left   Hip Flexion (0-120 ) 120 115 with mild knee pain       Hip Abduction (0-45 ) 35 35 with tightness and pain       Hip External Rotation (0-50 ) 40 30       Hip Internal Rotation (0-40 ) 25 5 with pain       Hip Extension (0-15 )          PROM in degrees PROM in degrees PROM in degrees    Right Left Right Left Right Left   Hip Flexion (0-120 )         Hip Abduction (0-45 )         Hip External Rotation (0-50 )         Hip Internal Rotation (0-40 )         Hip Extension (0-15 )               Flexibility:  gastroc bilat min-mod loss, HS 75-80o    Palpation:  Tender left popliteal, mil-mod left distal medial HS, popliteal , proximal medial gastroc, superior patella border, med/lat joint line, mid ITB and distal trochanter; right medial distal HS, medial joint line, mid ITB and distal trochanter    Knee Special Tests:   Girth:  Suprapatella bilat 42.0 cm; popliteal crease right 37.3 cm, left 40.7 cm    SLR Extension Lag:  [id number], left 3o     Knee ROM:  Date: 5/4/2017      Knee ROM ( ) AROM in degrees AROM in degrees AROM in degrees    Right Left  "Right Left Right Left   Knee Flexion (0-130 ) 130 120 mild pain       Knee extension (0 ) 10 7        PROM in degrees PROM in degrees PROM in degrees    Right Left Right Left Right Left   Knee Flexion (0-130 ) 130 130 feels tight       Knee extension (0 ) 0 0          Hip/Knee Strength     Date: 5/4/2017     Hip/Knee Strength (/5) MMT MMT MMT    Right Left Right Left Right Left   Hip Flexion         Hip Abduction         Hip Adduction         Hip Extension         Hip External Rotation         Hip Internal Rotation         Knee Extension 5 5-       Knee Flexion 5 5           Meniscal Tests Right (+/-) Left (+/-) Ligament Tests Right (+/-) Left (+/-)   Miguelangel s   Lachman     Joint Line Tenderness + but minimal + Anterior Drawer     Thessaly   Posterior Drawer     Apley s   Posterior sag     Knee OA Right (+/-) Left (+/-) Valgus Stress     1. > 51 y/o  2. Stiffness > 30 minutes  3. Crepitus   4. Bony tenderness  5. Bone enlargement  6. No warmth to the touch + + Varus Stress     Other Right (+/-) Left (+/-) Other     Ely s   Other     Leticia          Today's HEP:   Exercises:  Exercise #1: Quad set with SLR, 5x each-H  Comment #1: Hooklying Hip abduction isometric, 5x5\"-to be done seated with towel resistance at home-H  Exercise #2: Seated Hip adduction isometric with pillow, 5x5\"-H  Comment #2: Bridge, 5x-H  Exercise #3: SL hip abduction, 5x5\" each-H  Comment #3: Gastroc stretch with forefoot towel roll, 10\"x2 each-H    5/4/2017        Treatment Today     TREATMENT MINUTES COMMENTS   Evaluation 35 Bilat knees and hips   Self-care/ Home management     Manual therapy     Neuromuscular Re-education     Therapeutic Activity     Therapeutic Exercises 25 See flow sheet   Gait training     Modality__________________                Total 60    Blank areas are intentional and mean the treatment did not include these items.     PT Evaluation Code: (Please list factors)  Patient History/Comorbidities: recent onset 3 weeks ago " but chronic left>right knee pain 10+ years with previous cortisone injections/hip pain, pes planus, atril fibrillation  Examination: knees, hips strength and ROM, posture  Clinical Presentation: stable  Clinical Decision Making: low    Patient History/  Comorbidities Examination  (body structures and functions, activity limitations, and/or participation restrictions) Clinical Presentation Clinical Decision Making (Complexity)   No documented Comorbidities or personal factors 1-2 Elements Stable and/or uncomplicated Low   1-2 documented comorbidities or personal factor 3 Elements Evolving clinical presentation with changing characteristics Moderate   3-4 documented comorbidities or personal factors 4 or more Unstable and unpredictable High              Elizabeth Amador  5/4/2017  1:22 PM

## 2021-06-10 NOTE — PROGRESS NOTES
PVI education was completed:     See documented H&P completed by NP in EPIC    Reviewed pre and post op PVI procedure with pt, pre-procedure letter reviewed and given to pt- see letter in EPIC under documentation, see additional EP PVI prep note for details on procedure/prep, answered pt questions and concerns regarding procedure, consent was signed, time spent with pt was >45min and reviewed available pre-op lab results    Discussed importance of continuing anticoagulation uninterrupted pre and post ablation, pt denies missing any doses of their anticoagulant and pt denies issues with harrell placement in the past     Pt does have hx of TURP procedure 10 yrs ago, pt denies issues with harrell placement at that time.  Cassandra

## 2021-06-10 NOTE — PROGRESS NOTES
[facility] Daily Progress     Patient Name: Jaylan Alberto  PRECAUTIONS/RESTRICTIONS:  Atrial Fibrillation, HTN, Knee OA  Date: 5/18/2017  Visit #: 2  PTA visit #:  0  Referral Diagnosis: Bilateral Knee Pain  Referring provider: Parag Becker MD  Visit Diagnosis:     ICD-10-CM    1. Muscle weakness (generalized) M62.81    2. Decreased ROM of left knee M25.662    3. Heel cord tightness, unspecified laterality M67.00    4. Acute pain of both knees M25.561     M25.562          Assessment:     HEP/POC compliance is  good .  Patient demonstrates understanding/independence with home program.  Response to Intervention good with improving function with walking, standing, stair climbing and genuflecting  Patient is benefitting from skilled physical therapy and is making steady progress toward functional goals.  Patient is appropriate to continue with skilled physical therapy intervention, as indicated by initial plan of care.    Goal Status:  Pt. will demonstrate/verbalize independence in self-management of condition in : 6 weeks  Pt. will be independent with home exercise program in : 6 weeks  Pt. will have improved quality of sleep: waking less times/night;with less pain;in 6 weeks  Comment:: tolerating SL for sleep with less pain in hip/knees  Patient will ascend / descend: stairs;with recipricol gait;independently;with railing;with less pain;with less difficulty;in 6 weeks  Patient will increase : LEFS score;for improved quality of function;for improved quality of life;by _ points;in 6 weeks  by ___ points: 9  Pt will: be able to get in and out of car with less discomfort, greater ease in 6 weeks  Pt will: be able to have less pain after sustained knee flexion as with sitting, in 6 weeks    Plan / Patient Education:     Continue with initial plan of care.  Progress with home program as tolerated.   See in 2 weeks to advance HEP to forward step-ups and SLS with/without resistance band.    Subjective:     Pain  "Ratin  At walking or sitting but 2-3/10 when left SL affecting hip and knee.  C/c:  Left hip pain at SL for sleep or knee pain at right SL, despite using a towel pad between knees.    Response to PT/benefits:  Able to walk 40 minutes and not noting pain, and able to stand without difficulty with time he needs to stand, able to stair climb with less pain and able to genuflect without pain    Continued difficulties: SL right with lateral left knee pain and SL left with left hip pain    Objective:     Cues/reinstruction for SLR, breathing with exercise,SL hip abduction.    Today's Exercises:  Exercises:  Exercise #1: Quad set with SLR, 5x each-continue/reinstructed  Comment #1: Hooklying Hip abduction isometric, 5x5\"-to be done seated with towel resistance at home-DC if able to perform clamshell  Exercise #2: Seated Hip adduction isometric with pillow, 5x5\"-DC  Comment #2: Bridge, 5x-added knee pillow squeeze  Exercise #3: SL hip abduction, 5x5\" each-  Comment #3: Closed chain HS with blue band, 5x2\"-, bilat,H  Exercise #4: 4\" lateral step up, 10x right and 5x left-H  Comment #4: SLS on right with left index finger support, repeat on other side with contralateal support. 25\"-H    Tolerated new exercises but noting left knee is weaker with greater difficulty with SLS and lateral step up.    Treatment Today    TREATMENT MINUTES COMMENTS   Evaluation     Self-care/ Home management     Manual therapy     Neuromuscular Re-education     Therapeutic Activity     Therapeutic Exercises 40 See flow sheet   Gait training     Modality__________________                Total 40    Blank areas are intentional and mean the treatment did not include these items.       Elizabeth Amador  2017    "

## 2021-06-10 NOTE — PROGRESS NOTES
Patient in for EKG today to confirm rhythm status to decide on amiodarone continuation prior to PVI scheduled 5/26/17.  EKG confirms Afib 80bpm.   Patient confirms he knew he was in afib.   Feeling pretty good, has noticed he is taking the prescribed PRN diltiazem 30mg daily or BID due to HR's.    Gato do you suggest a different daily diltiazem dose in addition to his amiodarone dose?    Patient will follow up in May prior to PVI.    Thanks  Aga

## 2021-06-10 NOTE — ANESTHESIA CARE TRANSFER NOTE
Last vitals:   Vitals:    05/26/17 1352   BP: 100/66   Pulse: 70   Resp: 18   Temp: 36.3  C (97.3  F)   SpO2: 97%     Patient's level of consciousness is awake  Spontaneous respirations: yes  Maintains airway independently: yes  Dentition unchanged: yes  Oropharynx: oropharynx clear of all foreign objects    QCDR Measures:  ASA# 20 - Surgical Safety Checklist: ASA20A - Safety Checks Done  PQRS# 430 - Adult PONV Prevention: 4558F - Pt received => 2 anti-emetic agents (different classes) preop & intraop  ASA# 8 - Peds PONV Prevention: NA - Not pediatric patient, not GA or 2 or more risk factors NOT present  PQRS# 424 - Aarti-op Temp Management: 4559F - At least one body temp DOCUMENTED => 35.5C or 95.9F within required timeframe  PQRS# 426 - PACU Transfer Protocol:NA - Patient did not go to PACU  ASA# 14 - Acute Post-op Pain: ASA14B - Patient did NOT experience pain >= 7 out of 10

## 2021-06-10 NOTE — ANESTHESIA POSTPROCEDURE EVALUATION
Patient: Jaylan Alberto  EP Ablation PVI - *KAY IN [address]IOR**GENERAL ANESTHESIA WHOLE CASE, H&P DONE, TEACH-ALLYSON, 6 HRS, ICE NEEDED, AMANDO BOOKED, NO DEVICE (JPERKINS), Intraprocedure External Cardioversion  Anesthesia type: general    Patient location: PACU  Last vitals:   Vitals:    05/26/17 1352   BP: 100/66   Pulse: 70   Resp: 18   Temp: 36.3  C (97.3  F)   SpO2: 97%     Post vital signs: stable  Level of consciousness: awake and responds to simple questions  Post-anesthesia pain: pain controlled  Post-anesthesia nausea and vomiting: no  Pulmonary: unassisted, return to baseline  Cardiovascular: stable and blood pressure at baseline  Hydration: adequate  Anesthetic events: no    QCDR Measures:  ASA# 11 - Aarti-op Cardiac Arrest: ASA11B - Patient did NOT experience unanticipated cardiac arrest  ASA# 12 - Aarti-op Mortality Rate: ASA12B - Patient did NOT die  ASA# 13 - PACU Re-Intubation Rate: ASA13B - Patient did NOT require a new airway mgmt  ASA# 10 - Composite Anes Safety: ASA10A - No serious adverse event  ASA# 38 - New Corneal Injury: ASA38A - No new exposure keratitis or corneal abrasion in PACU    Additional Notes:

## 2021-06-11 NOTE — PROGRESS NOTES
"[facility] Clinic Follow Up Note    Jaylan Alberto   77 y.o. male    Date of Visit: 6/5/2017    Chief Complaint   Patient presents with     Hospital Visit Follow Up     Pneumonia     Subjective  Jaylan comes in today after he was recently hospitalized after he had an atrial fibrillation ablation on May 26 and was lying flat for a great deal of time.  He represented on May 28 with fever and a cough and was diagnosed with lower lobe pneumonia is consistent with likely aspiration and was placed on Augmentin.  He is improving.  He is still laying low and not doing all of his normal activities.  He was sent home with Augmentin.  He has remained out of atrial fibrillation but remains anticoagulated.  He was discharged home on May 30.    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  He quit smoking cigarettes in 1982.  He does drink some alcohol, but not excessively.  He tries to walk several times a week.  He has 3 children (one son is biological and his other son and daughter are adopted) and 2 grandchildren.       Medications were reconciled.  Allergies, social and family history, and the problem list were all reviewed and updated.      Exam  General Appearance: Pleasant and alert  Vitals:    06/05/17 1255   BP: 132/70   Pulse: 64   Resp: 12   SpO2: 98%   Weight: 202 lb (91.6 kg)   Height: 5' 9\" (1.753 m)      Body mass index is 29.83 kg/(m^2).  Wt Readings from Last 3 Encounters:   06/05/17 202 lb (91.6 kg)   05/30/17 200 lb 12.8 oz (91.1 kg)   05/27/17 206 lb 6.4 oz (93.6 kg)     HEENT: Sclera are clear.   Lungs: Normal respirations, clear to auscultation  Cardiac: Regular rate and rhythm  Abdomen: Soft and nondistended  Extremities: No " edema  Skin: No rashes  Neuro: Moves all extremities and has facial symmetry  Gait: Ambulates with a normal gait    Assessment/Plan  1. Pneumonia of both lower lobes due to infectious organism  He has finished up with antibiotics.  Would recommend an inhaler as needed for wheezing and let us know if things do not continue to improve.  We will have him follow-up with me in about 5 weeks we will repeat a chest x-ray to ensure this is gone.    2. Osteoarthritis  He uses Voltaren gel which he needs a refill.    3. Atypical atrial flutter  Is post ablation.    4. Pneumonia  Future follow-up x-ray is ordered.  - XR Chest PA and Lateral; Future      April D MD Willy  6/5/2017    Much or all of the text in this note was generated through the use of Dragon Dictate voice-to-text software. Errors in spelling or words which seem out of context are unintentional. Sound alike errors, in particular, may have escaped editing.

## 2021-06-11 NOTE — PROGRESS NOTES
Subjective findings: The patient returned to the clinic today complaining   of long thick painful nails on both feet.   OBJECTIVE FINDINGS: Nails bilaterally are long, thick, discolored, and dystrophic 1 through 5   both feet. The patient has an incurvated medial border, both great toenails.   Skin bilaterally warm, supple, and intact. No skin lesions are noted. DP   and PT pulses plus 2/4 bilaterally. Capillary refill less than 2 seconds   bilaterally. Negative clonus. Negative Babinski bilaterally. Range of   motion within normal limits bilaterally. Muscle power plus 5/5 bilaterally   within all compartments.   ASSESSMENT:   1. Onychomycosis.   2. Onychauxis.   PLAN: I debrided nails 1 through 5 both feet today. I have recommended that   the patient return to clinic every 3 months for continued foot care

## 2021-06-11 NOTE — PROGRESS NOTES
"[facility] Clinic Follow Up Note    Jaylan Ablerto   77 y.o. male    Date of Visit: 7/3/2017    Chief Complaint   Patient presents with     Follow-up     Pneumonia     Subjective  Jaylan comes in today to follow-up recent pneumonia.  He was diagnosed with pneumonia at the end of May.  I saw him on June 5 and he had been improving.  It was mainly a right lower lobe infiltrate.  About a week or so ago he came down with another illness that seemed to be more upper airway and sinuses and he completed a course of a azithromycin.  He feels like that is on the mend but he still has some congestion.  He denies any other productive cough or complaints.    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  He quit smoking cigarettes in 1982.  He does drink some alcohol, but not excessively.  He tries to walk several times a week.  He has 3 children (one son is biological and his other son and daughter are adopted) and 2 grandchildren.       Medications were reconciled.  Allergies, social and family history, and the problem list were all reviewed and updated.    Old records reviewed: His chest x-ray is looked at directly and compared with today's    Exam  General Appearance: Pleasant and alert  Vitals:    07/03/17 1328   BP: 116/60   Pulse: 66   Resp: 12   Weight: 205 lb (93 kg)   Height: 5' 9\" (1.753 m)      Body mass index is 30.27 kg/(m^2).  Wt Readings from Last 3 Encounters:   07/03/17 205 lb (93 kg)   06/05/17 202 lb (91.6 kg)   05/30/17 200 lb 12.8 oz (91.1 kg)     HEENT: Sclera are clear.   Lungs: Normal respirations, clear to auscultation  Cardiac: Regular rate and rhythm  Abdomen: Soft and nondistended  Extremities: No edema  Skin: No " rashes  Neuro: Moves all extremities and has facial symmetry  Gait: Ambulates with a normal gait    Chest x-ray today shows clear right lower lobe when compared with previous chest x-ray done at the end of May.    Assessment/Plan  1. Pneumonia of both lower lobes due to infectious organism  Resolved.  Chest x-ray looks good.  Will await official radiology report as well.    2. Sinus infection  Seems to be resolving, he will let us know if his symptoms recur.          Rossy Aguilera MD  7/3/2017    Much or all of the text in this note was generated through the use of Dragon Dictate voice-to-text software. Errors in spelling or words which seem out of context are unintentional. Sound alike errors, in particular, may have escaped editing.

## 2021-06-11 NOTE — PROGRESS NOTES
[facility] Daily Progress     Patient Name: Jaylan Alberto  PRECAUTIONS/RESTRICTIONS:  Atrial Fibrillation, HTN, Knee OA  Date: 6/16/2017  Visit #: 3  PTA visit #:  0  Referral Diagnosis: Bilateral Knee Pain  Referring provider: Parag Becker MD  Visit Diagnosis:     ICD-10-CM    1. Muscle weakness (generalized) M62.81    2. Decreased ROM of left knee M25.662    3. Heel cord tightness, unspecified laterality M67.00    4. Acute pain of both knees M25.561     M25.562          Assessment:     HEP/POC compliance is  poor to fair due to medical issues from cardiac procedure and pneumonia over the past month..  Patient demonstrates understanding/independence with home program.  Response to Intervention good improvement in knee pain/swelling/tenderness and improved function.   Meeting expectations.  LEFS Outcome Measure demonstrated a statistically significant change,.    Goal Status:  Pt. will demonstrate/verbalize independence in self-management of condition in : Met  Pt. will be independent with home exercise program in : Met  Pt. will have improved quality of sleep: Progressing toward (2-3 interrupions for toilet; able to get more comfort for sl)  Comment:: 2-3 interruptions for toilet; incr ease finding good sleep position, less hip/knee pain althought some left hip pain at SL yet  Patient will ascend / descend: Progressing toward (reciprocal ascend is fine; singular descend performed for yr)  Patient will increase : Progressing toward;Met;Comment (increased by 8 points)  by ___ points: 9  Comment: increase of 9 points noted.  Pt will: be able to get in and out of car with less discomfort, greater ease in 6 weeks-met  Pt will: be able to have less pain after sustained knee flexion as with sitting, in 6 weeks-met    Plan / Patient Education:     Patient feels his is doing well enough and will be able to continue independently on HEP.  He will call or MYCHART if needs/questions arise in the next 30 days.   "If no contact from patient will DC chart and patient on HEP    Subjective:     Pain Ratin  At walking or sitting but 2-3/10 when left SL affecting hip or with stair descend reciprocally, especially on left  C/c:  Left hip pain at SL for sleep or with reciprocal stair descent.    Response to PT/benefits:  Able to walk /stand/ perform car transfers and sit without significant functional difficulties and sleep has improved.  Continued difficulties: Stair descent reciprocally with left knee pain    LEFS Outcome Measure:  Initial 56/80  Current 65/80    Objective:           Flexibility:  gastroc bilat min-mod loss, HS 75-80o     Palpation:  Tender left popliteal, left>rightmed joint line, bilat mid ITB and right post trochanter; medial joint line,   Knee Special Tests:   Girth:  Suprapatella bilat 42.0 cm; popliteal crease right 37.0 cm, left 39 cm     SLR Extension Lag:  [id number], left 5o    TKE bilat 5o extension  Knee ROM:           Date: 2017       Knee ROM ( ) AROM in degrees AROM in degrees AROM in degrees     Right Left Right Left Right Left   Knee Flexion (0-130 ) 130 120 mild pain 133  130 tight        Knee extension (0 ) 10 7 11 mild stretch  11 mild stretch         PROM in degrees PROM in degrees PROM in degrees     Right Left Right Left Right Left   Knee Flexion (0-130 ) 130 130 feels tight           Knee extension (0 ) 0 0 0 0                                 Exercises:  Exercise #1: Quad set with SLR, 5x each-continue/reinstructed  Comment #1: Hooklying Hip abduction isometric, 5x5\"-to be done seated with towel resistance at home-DC if able to perform clamshell  Exercise #2: Seated Hip adduction isometric with pillow, 5x5\"-DC  Comment #2: Bridge, 5x-added knee pillow squeeze  Exercise #3: SL hip abduction, 5x5\" each-  Comment #3: Closed chain HS with blue band, 5x2\"-, bilat,H  Exercise #4: 4\" lateral step up, 10x right and 5x left-H  Comment #4: SLS on right with left index finger support, " "repeat on other side with contralateal support. 25\"- and demonstrated and discussed support with 2 fingers as he increases the challenge to support on ipsilateral side of stance leg.  Exercise #5: 2-3 inch step up and over 10x-H    Tolerated new exercises,    Treatment Today    TREATMENT MINUTES COMMENTS   Evaluation     Self-care/ Home management     Manual therapy     Neuromuscular Re-education     Therapeutic Activity     Therapeutic Exercises 15 See flow sheet   Gait training     Modality__________________     ROM assessment 10          Total 25    Blank areas are intentional and mean the treatment did not include these items.       Elizabeth Amador  6/16/2017      [facility] Discharge Summary  Patient Name: Jaylan Alberto  Date: 7/21/2017  Referral Diagnosis: Bilateral Knee Pain  Referring provider: Parag Becker MD  Visit Diagnosis:   1. Muscle weakness (generalized)     2. Decreased ROM of left knee     3. Heel cord tightness, unspecified laterality     4. Acute pain of both knees         Goals:  Pt. will demonstrate/verbalize independence in self-management of condition in : Met  Pt. will be independent with home exercise program in : Met  Pt. will have improved quality of sleep: Progressing toward (2-3 interrupions for toilet; able to get more comfort for sl)  Comment:: 2-3 interruptions for toilet; incr ease finding good sleep position, less hip/knee pain althought some left hip pain at SL yet  Patient will ascend / descend: Progressing toward (reciprocal ascend is fine; singular descend performed for yr)  Patient will increase : Progressing toward;Met;Comment (increased by 8 points)  by ___ points: 9  Comment: increase of 9 points noted.  Pt will: be able to get in and out of car with less discomfort, greater ease in 6 weeks-met  Pt will: be able to have less pain after sustained knee flexion as with sitting, in 6 weeks-met    Patient was seen for 3 visits from 5/4/2017 to 6/16/2017 with 2 " missed appointments.  The patient met/making progress toward goals and has demonstrated understanding of and independence in the home program for self-care, and progression to next steps.  He will initiate contact if questions or concerns arise.  The patient reports feeling better and did not wish to schedule further therapy at this time.  Patient received a home program for balance, LE stretches, quad/hip/pelvis strengthening    Therapy will be discontinued at this time.  The patient will need a new referral to resume.    Thank you for your referral.  Elizabeth Amador  7/21/2017  7:09 AM

## 2021-06-12 NOTE — ANESTHESIA PREPROCEDURE EVALUATION
Anesthesia Evaluation      Patient summary reviewed   No history of anesthetic complications     Airway   Mallampati: III  Neck ROM: full   Pulmonary - normal exam   (-) pneumonia                         Cardiovascular   (+) hypertension, dysrhythmias, CHF, , hypercholesterolemia,     Rhythm: irregular        Neuro/Psych      Endo/Other       GI/Hepatic/Renal    (+) hiatal hernia, GERD well controlled,        Other findings: H/o cardioversion 3/22/17. Had a-fib ablation 5/26/17 complicated by likely aspiration pneumonia plus urinary retention.  HH BCC face, prostate Ca, anemia.  Snores, but 2014 sleep study negative per chart.  Echo 3/3/17: EF 59%, mild AI.  Chart mentions angiogram 2014 but can not find results. At a-fib ablation surgery was found to have severe LA fibrosis.        Dental - normal exam                        Anesthesia Plan  Planned anesthetic: general mask    ASA 3   Induction: intravenous   Anesthetic plan and risks discussed with: patient    Post-op plan: routine recovery

## 2021-06-12 NOTE — ANESTHESIA CARE TRANSFER NOTE
Last vitals:   Vitals:    08/18/17 1237   BP: 138/80   Pulse: 77   Resp: 22   Temp: 36.7  C (98  F)   SpO2: 97%     Patient's level of consciousness is drowsy  Spontaneous respirations: yes  Maintains airway independently: yes  Dentition unchanged: yes  Oropharynx: oropharynx clear of all foreign objects    QCDR Measures:  ASA# 20 - Surgical Safety Checklist: WHO surgical safety checklist completed prior to induction  PQRS# 430 - Adult PONV Prevention: NA - Not adult patient, not GA or 3 or more risk factors NOT present  ASA# 8 - Peds PONV Prevention: NA - Not pediatric patient, not GA or 2 or more risk factors NOT present  PQRS# 424 - Aarti-op Temp Management: NA - MAC anesthesia or case < 60 minutes  PQRS# 426 - PACU Transfer Protocol: - Transfer of care checklist used  ASA# 14 - Acute Post-op Pain: ASA14B - Patient did NOT experience pain >= 7 out of 10

## 2021-06-12 NOTE — PROGRESS NOTES
Patient comes to clinic for rhythm evaluation after noting that he was in atrial fibrillation yesterday, 8-15-17.  Please see telephone note.  EKG shows AFib @89.  Pt states he is feeling fatigued but no other symptoms.   Per Dr. Dailey, initiate Sotalol 80 mg PO BID.  Cardioversion on Thursday or Friday.  Education for Cardioversion completed, discussed no missed doses of Eliquis and NPO for 8 hours prior to procedure.  Orders placed for Cardioversion and discussed arrival time with patient, discussed need for a  home.  Patient states understanding, answered all questions and reviewed contact information.

## 2021-06-12 NOTE — PROGRESS NOTES
[facility] HEART [facility]  Arrhythmia Clinic  Orlin Dailey    Referring:      Assessment:         Persistent atrial fibrillation: The patient is approximately 3 months out from his complex procedure to treat his persistent atrial fibrillation.  The patient has had relatively late recurrence of atrial fibrillation and required cardioversion on August 18.  The patient was placed on sotalol at that time.  He has had some mild bradycardia with resting heart rates in the 50s and exercise heart rates in the 80s since the initiation of sotalol therapy.  Overall however he feels better than he did prior to the ablation when he was in persistent atrial fibrillation.  The patient remains on oral anticoagulant therapy due to her elevated [id number] score.  He has known severe left atrial fibrosis and is not a particularly good candidate for repeat ablation.    Hypertension: The patient's blood pressure is at target on his current medical therapy.      Recommendations:    Plan to continue sotalol therapy for another 3 months.  If the patient remains free of recurrent atrial fibrillation or flutter then he can have his sotalol dose cut to 40 mg twice daily.  I would then anticipate seeing him back 3 months later to consider possible discontinuation of the sotalol.    24-hour Holter monitor to assess the patient's heart rate response to activities of daily living and look for any significant bradycardia or pauses.    No change in dose of his current medications at today's visit.    Follow-up in A. fib clinic with the EP nurse practitioner in 3 months.      Patient Active Problem List   Diagnosis     Basal Cell Carcinoma Of The Skin Of The Face     Prostate Cancer     Hyperlipidemia     Essential hypertension with goal blood pressure less than 140/90     Male Erectile Disorder     Esophageal Reflux     Osteoarthritis     History of snoring     Persistent atrial fibrillation     Status post catheter ablation of atrial  "fibrillation     Atypical atrial flutter     Pneumonia of both lower lobes due to infectious organism     Postoperative urinary retention     Chronic anticoagulation       Subjective:  Jaylan Alberto (77 y.o. male) returns to the arrhythmia clinic for interval reevaluation of his persistent atrial fibrillation post ablation.  The patient had a difficult post procedure recovery including pneumonia.  He is fully recovered from that.  The patient develop recurrent symptoms of tachypalpitations elevated heart rate in mid August.  He underwent successful cardioversion on August 18 and has been on sotalol since that time.  HEENT his wife note that his heart rates at rest are typically in the mid to high 50s.  With walking briskly up a hill he will get heart rates into the mid 80s.  He has had no recurrent palpitations since being on sotalol.  He reports that he is somewhat more fatigued on the medication but that in general he feels significantly \"better\" than he did prior to his ablation procedure.  He reports no exertional chest pain or pressure.  He is not describing any orthopnea, PND, or ankle edema.    Current Outpatient Prescriptions   Medication Sig Dispense Refill     acetaminophen (TYLENOL) 325 MG tablet Take 650 mg by mouth every 6 (six) hours as needed for pain.       albuterol (PROAIR HFA;PROVENTIL HFA;VENTOLIN HFA) 90 mcg/actuation inhaler Inhale 2 puffs every 4 (four) hours as needed for wheezing. 1 Inhaler 0     aspirin 81 MG EC tablet Take 1 tablet (81 mg total) by mouth daily.  0     diclofenac sodium (VOLTAREN) 1 % Gel APPLY 2-4 GRAMS TO AFFECTED AREAS INDICATED IN DIRECTIONS 100 g 1     ELIQUIS 5 mg Tab tablet TAKE 1 TABLET (5 MG TOTAL) BY MOUTH 2 (TWO) TIMES A DAY. 180 tablet 3     ketoconazole (NIZORAL) 2 % cream Apply 1 application topically 2 (two) times a day as needed.        losartan (COZAAR) 100 MG tablet TAKE 1 TABLET BY MOUTH DAILY 90 tablet prn     omeprazole (PRILOSEC) 20 MG capsule Take 20 " "mg by mouth daily with supper.        polyvinyl alcohol (LIQUIFILM TEARS) 1.4 % ophthalmic solution Administer 1 drop to both eyes as needed for dry eyes.       sotalol (BETAPACE) 80 MG tablet Take 1 tablet (80 mg total) by mouth 2 (two) times a day. 60 tablet 6     No current facility-administered medications for this visit.        Review of Systems:   General: WNL  Eyes: WNL  Ears/Nose/Throat: WNL  Lungs: WNL  Heart: WNL  Stomach: WNL  Bladder: WNL  Muscle/Joints: WNL  Skin: WNL  Nervous System: WNL  Mental Health: WNL     Blood: WNL    Family History  Family History   Problem Relation Age of Onset     Stroke Mother       age 68     Depression Father       of suicide age 73     Atrial fibrillation Brother        Social History   reports that he quit smoking about 34 years ago. He has a 30.00 pack-year smoking history. He has never used smokeless tobacco. He reports that he drinks about 0.6 oz of alcohol per week  He reports that he does not use illicit drugs.    Objective:   Vital signs in last 24 hours:  /70 (Patient Site: Left Arm, Patient Position: Sitting, Cuff Size: Adult Large)  Pulse 64  Resp 18  Ht 5' 9\" (1.753 m)  Wt 206 lb (93.4 kg)  BMI 30.42 kg/m2  Weight: Weight: 206 lb (93.4 kg)     Physical Exam:  General: The patient is alert oriented to person place and situation.  The patient is in no acute distress at the time of my evaluation.  Eyes: Pupils are equal, round, and reactive to light.  Conjunctiva and sclera are clear.  ENT: Oral mucosa is moist and without redness. No evident nasal discharge.  Pulmonary: Lungs are clear bilaterally with no rales, rhonchi, or wheezes.    Cardiovascular exam: Rhythm is regular. S1 and S2 are normal. No significant murmur is present. JVP is normal. Lower extremities demonstrate no significant edema. Distal pulses are intact bilaterally.  Abdomen is flat, soft, and nontender.  Musculoskeletal: Spine is straight. Extremities without " deformity.  Neuro: Gait is normal.   Skin is warm, dry, and otherwise intact.      Cardiographics:   ESTUARDO August 1, 2017  MULTI-DAY PATIENT ACTIVATED MONITOR (ESTUARDO) REPORT     Results:    Indication for study: Paroxysmal atrial fibrillation    The monitor was worn: From August 1, 2017 through August 14, 2017.  Compliance with the monitor was excellent with 99% of the recording interval having interpretable data.    The baseline rhythm transmission demonstrated normal sinus rhythm with heart rates in the 60s.  The NC interval, QRS duration, and QT intervals all appear to be normal.    The patient had a single manually activated rhythm recordings.  Symptoms were not reported.  The patient's rhythm demonstrated normal sinus rhythm with normal intervals and no ectopy.      The patient had no auto triggered recordings.  Symptoms were not reported.      Impression:    Essentially normal multi-day patient activated monitor    The patient did not report any symptoms therefore correlation with the patient's clinical events cannot be made.    Indication for study was atrial fibrillation.  There was no demonstration of any atrial for ablation or flutter.    There was no sustained atrial or ventricular tachyarrhythmia    There is no profound bradycardia or pauses.    Lab Results:   Lab Results   Component Value Date     (L) 05/30/2017    K 4.0 05/30/2017     05/30/2017    CO2 26 05/30/2017    BUN 8 05/30/2017    CREATININE 0.79 05/30/2017    CALCIUM 8.7 05/30/2017     Lab Results   Component Value Date    WBC 13.0 (H) 05/29/2017    WBC 5.4 10/15/2014    HGB 11.1 (L) 05/29/2017    HCT 32.0 (L) 05/29/2017    MCV 94 05/29/2017     05/29/2017     Lab Results   Component Value Date    INR 1.43 (H) 10/13/2016         Outside record review:

## 2021-06-12 NOTE — PROGRESS NOTES
[facility]    Assessment/Plan:      Problem List Items Addressed This Visit     Essential hypertension with goal blood pressure less than 140/90    Persistent atrial fibrillation - Primary    Relevant Orders    ESTUARDO Hook-Up    Status post catheter ablation of atrial fibrillation    Relevant Orders    ESTUARDO Hook-Up    Atypical atrial flutter    Relevant Orders    ESTUARDO Hook-Up        1.  Persistent atrial fibrillation and flutter: Initially diagnosed 2013.  Amiodarone was discontinued at the time of ablation.  He has had no symptomatology or evidence of recurrence of arrhythmia off antiarrhythmic therapy.  However, he is at significant risk for recurrence due to severe left atrial fibrosis.  He will wear a two-week monitor for further evaluation.  He has a [id number] score of 3 for age >75 and hypertension and is on Eliquis 5 mg twice daily for stroke prophylaxis.      2.  Hypertension: Blood pressure at target today.    Follow-up with Dr. Dailey on 8/29/2017 for his 3 months post-PVI evaluation.    Subjective:      Jaylan Alberto , a very pleasant 77-year-old gentleman, comes in today for follow-up of atrial fibrillation and flutter.  He has a history of persistent atrial fibrillation since 2013.  Symptoms consist of fatigue and decreased activity tolerance.  He previously failed antiarrhythmic therapy with flecainide and Tikosyn in before being switched to amiodarone in September 2016.  However, he had recurrence in March 2017 despite a temporary increase in amiodarone had early recurrence following cardioversion.  He underwent pulmonary vein isolation ablation with Dr. Dailey on 5/26/2017 with cryo to all 5 pulmonary veins, CFE, VIKKI line, and right atrial CTI flutter line.  During the ablation, he was noted to have severe left atrial fibrosis.  Amiodarone was discontinued following ablation.  He is on long-term anticoagulation with Eliquis 5 mg twice daily.     He was rehospitalized 520 8/17 through 5/30/17  for bilateral lower lobe pneumonia.  He reports that he is now feeling well; he denies any symptomatic recurrence of arrhythmia.  He denies any heartburn, difficulty swallowing, or groin site issues.  He denies chest discomfort, palpitations, shortness of breath, paroxysmal nocturnal dyspnea, orthopnea, lightheadedness, dizziness, pre-syncope, or syncope.  He states his pulse has been distantly regular between 68 and 72 bpm.  However one day he was playing golf and it was very hot, he noted that he did not feel comfortable in his heart rate was 90 though he quit playing and quickly recovered.    Medical, surgical, family, social history, and medications were reviewed and updated as necessary.  He denies any previous adverse reactions to anesthesia or difficulty with Morton urinary catheter.    Patient Active Problem List   Diagnosis     Basal Cell Carcinoma Of The Skin Of The Face     Prostate Cancer     Hyperlipidemia     Essential hypertension with goal blood pressure less than 140/90     Male Erectile Disorder     Esophageal Reflux     Osteoarthritis     History of snoring     Persistent atrial fibrillation     Status post catheter ablation of atrial fibrillation     Atypical atrial flutter     Pneumonia of both lower lobes due to infectious organism     Postoperative urinary retention     Chronic anticoagulation       Past Medical History:   Diagnosis Date     Arthritis     Generalized     Atrial fibrillation      Cancer     Prostate and skin     CHF (congestive heart failure)     PMH     GERD (gastroesophageal reflux disease)      Hyperlipidemia      Hypertension      Status post catheter ablation of atrial fibrillation 5/26/2017    PVI May 26, 2017 (cryo-PVI + CFE + VIKKI line + CTI line)       Past Surgical History:   Procedure Laterality Date     CARDIAC CATHETERIZATION  Nov 2014     CARDIAC ELECTROPHYSIOLOGY MAPPING AND ABLATION  5/26/17    PVI     CARDIOVERSION      8/17/16, 10/10/16, 3/22/17     CATARACT  EXTRACTION       TX CORRJ HALLUX VALGUS W/SESMDC W/RESCJ PROX PHAL      Description: Bunion Correction By Cheilectomy;  Recorded: 06/29/2011;     TX EPHYS EVAL W/ABLATION SUPRAVENT ARRHYTHMIA  5/26/2017    Procedure: EP Ablation Atrial Flutter;  Surgeon: Orlin Dailey MD;  Location: [facility] Cath Lab;  Service: Cardiology     TX EPHYS EVL TRNSPTL TX ATRIAL FIB ISOLAT PULM VEIN Left 5/26/2017    Procedure: EP Ablation PVI;  Surgeon: Orlin Dailey MD;  Location: [facility] Cath Lab;  Service: Cardiology     TX EXCIS TENDON SHEATH LESION, HAND/FINGER      Description: Hand Excision Of A Tendon Cyst;  Recorded: 06/29/2011;  Comments: thumb     TX EXCISION MULTIPLE EXTERNAL PAPILLAE/TAGS ANUS      Description: Hemorrhoidectomy Excision Of External Tags;  Recorded: 06/16/2011;     TX REMOVAL OF TONSILS,<11 Y/O      Description: Tonsillectomy;  Recorded: 06/16/2011;     TX REPAIR OF HAMMERTOE,ONE      Description: Arthroplasty For Hammertoe;  Recorded: 06/16/2011;     TX TRANSURETHRAL ELEC-SURG PROSTATECTOM      Description: Transurethral Resection Of Prostate (TURP);  Recorded: 06/29/2011;  Comments: Dr. Lopez 2007 with small foci of Pr CA present       Allergies   Allergen Reactions     No Known Drug Allergies        Current Outpatient Prescriptions   Medication Sig Dispense Refill     acetaminophen (TYLENOL) 325 MG tablet Take 650 mg by mouth every 6 (six) hours as needed for pain.       albuterol (PROAIR HFA;PROVENTIL HFA;VENTOLIN HFA) 90 mcg/actuation inhaler Inhale 2 puffs every 4 (four) hours as needed for wheezing. 1 Inhaler 0     aspirin 81 MG EC tablet Take 1 tablet (81 mg total) by mouth daily.  0     diclofenac sodium (VOLTAREN) 1 % Gel APPLY 2-4 GRAMS TO AFFECTED AREAS INDICATED IN DIRECTIONS 100 g 1     diltiazem (CARDIZEM CD) 120 MG 24 hr capsule Take 1 capsule (120 mg total) by mouth daily. 90 capsule 1     ELIQUIS 5 mg Tab tablet TAKE 1 TABLET (5 MG TOTAL) BY MOUTH 2 (TWO) TIMES A DAY. 180 tablet  "3     ketoconazole (NIZORAL) 2 % cream Apply 1 application topically 2 (two) times a day as needed.        losartan (COZAAR) 100 MG tablet TAKE 1 TABLET BY MOUTH DAILY 90 tablet prn     omeprazole (PRILOSEC) 20 MG capsule Take 20 mg by mouth daily with supper.        polyvinyl alcohol (LIQUIFILM TEARS) 1.4 % ophthalmic solution Administer 1 drop to both eyes as needed for dry eyes.       No current facility-administered medications for this visit.        Family History   Problem Relation Age of Onset     Stroke Mother       age 68     Depression Father       of suicide age 73     Atrial fibrillation Brother        Social History   Substance Use Topics     Smoking status: Former Smoker     Packs/day: 1.00     Years: 30.00     Quit date: 1983     Smokeless tobacco: Never Used     Alcohol use 0.6 oz/week     1 Glasses of wine per week       Review of Systems:   General: WNL  Eyes: WNL  Ears/Nose/Throat: WNL  Lungs: WNL  Heart: WNL  Stomach: WNL  Bladder: WNL  Muscle/Joints: WNL  Skin: WNL  Nervous System: WNL  Mental Health: WNL     Blood: WNL      Objective:    /60 (Patient Site: Right Arm, Patient Position: Sitting, Cuff Size: Adult Regular)  Pulse 72  Resp 16  Ht 5' 9\" (1.753 m)  Wt 203 lb (92.1 kg)  BMI 29.98 kg/m2    Physical Exam  General Appearance:  Alert, well-appearing, in no acute distress.     HEENT:  Atraumatic, normocephalic; no scleral icterus, EOM intact, PERRL; the mucous membranes were pink and moist.   Chest: Chest symmetric, spine straight.     Lungs:   Respirations unlabored; the lungs are clear to auscultation.   Cardiovascular:   Auscultation reveals normal first and second heart sounds with no murmurs, rubs, or gallops.  Regular rate and rhythm. No JVD.  Radial and posterior tibial pulses are intact.    Abdomen:  Soft, nontender, nondistended, bowel sounds present.     Extremities: No cyanosis, clubbing, or edema.   Musculoskeletal:  Moves all extremities.     Skin: No " xanthelasma.   Neurologic: Mood and affect are appropriate. Oriented to person, place, time, and situation.       Cardiographics  EC17 was personally reviewed, shows sinus rhythm at 72 bpm with first-degree AV block and nonspecific IVCD  EC17 was personally reviewed, shows atrial fibrillation with controlled ventricular response at 68 bpm    Echocardiogram: Done 3/3/17     When compared to the previous study dated 2013, there is no significant change.    Left ventricle ejection fraction is normal. The calculated left ventricular ejection fraction is 59%.    Normal right ventricular size and function.  Mild aortic valve regurgitation.    CT pulmonary veins: Done 17  Left superior pulmonary vein: 20.1 x 12.8mm, area 16.9sq cm.  Left inferior pulmonary vein: 20.5 x 20.3mm, area 32.5sq cm.  Right superior pulmonary vein: 22.2 x 23.1mm, area 36.3sq cm.  Right middle pulmonary vein: 12.5 x 11.4mm, area 11.3sq cm.   Right inferior pulmonary vein: 19.5 x 18mm, area 26.3sq cm.   Esophagus: The esophagus is adjacent to the posterior side of the left atrial body.  Extracardiac findings:    1.  Hiatal hernia.    Lab Review   Lab Results   Component Value Date    CREATININE 0.79 2017    BUN 8 2017     (L) 2017    K 4.0 2017     2017    CO2 26 2017     Lab Results   Component Value Date    WBC 13.0 (H) 2017    HGB 11.1 (L) 2017    HCT 32.0 (L) 2017    MCV 94 2017     2017             Andria Gastelum, [facility] [place] [facility], Electrophysiology  [phone]    This note has been dictated using voice recognition software. Any grammatical, typographical, or context distortions are unintentional and inherent to the software.

## 2021-06-12 NOTE — ANESTHESIA POSTPROCEDURE EVALUATION
Patient: Jaylan Alberto  * No procedures listed *  Anesthesia type: general    Patient location: room 3112  Last vitals:   Vitals:    08/18/17 1237   BP: 138/80   Pulse: 77   Resp: 22   Temp: 36.7  C (98  F)   SpO2: 97%     Post vital signs: stable  Level of consciousness: awake and answers questions readily  Post-anesthesia pain: pain controlled  Post-anesthesia nausea and vomiting: no  Pulmonary: supplemental oxygen when seen  Cardiovascular: stable and blood pressure at baseline  Hydration: adequate  Anesthetic events: no    QCDR Measures:  ASA# 11 - Aarti-op Cardiac Arrest: ASA11B - Patient did NOT experience unanticipated cardiac arrest  ASA# 12 - Aarti-op Mortality Rate: ASA12B - Patient did NOT die  ASA# 13 - PACU Re-Intubation Rate: ASA13B - Patient did NOT require a new airway mgmt  ASA# 10 - Composite Anes Safety: ASA10A - No serious adverse event  ASA# 38 - New Corneal Injury: ASA38A - No new exposure keratitis or corneal abrasion in PACU    Additional Notes:

## 2021-06-12 NOTE — PROGRESS NOTES
1940  [phone] (home)  [phone] (mobile)    +++Important patient information for [facility]/Cath Lab staff : None+++    [facility] EP Cath Lab Procedure Order   Cardioversion:  Cardioversion    Diagnosis:  AF  Anticipated Case Duration:  Standard  Scheduling Needs/Timeframe:  8-18-17    Current Device: None None  Device Company/Device Rep Needed for Procedure: None    Anesthesia:  General-CV Only  Research Protocol:  No    [facility] EP Cath Lab Prep   Ordering Provider: Dr Dailey  Ordering Date: 8/16/2017  Orders Status: Intial order placed and Order set placed  EP NC Contact: Kelley Taylor RN    H&P:  Compled by Andria Gastelum  on 7-31-17  PCP: Rossy Aguilera MD, [phone]    Pre-op Labs: N/A for procedure    Medical Records Pertinent for Procedure:  Cardioversion 3-22-17, CT/MRI CT Pulmonary Veins 4-26-17, KAY 5-26-17 EF 55%, EKG 8-16-17 AFib @89 and Ablation Record PVI w/SWA 5-26-17    Patient Education:    Teach with Patient: Complete in the clinic on 8-16-17    Risks Reviewed:     Cardioversion    >90% acute success rate, <10% failure to convert or   reverts shortly after cardioversion.    <1% embolic event of (CVA, pulmonary embolism, or   other site).    75% risk for superficial burn.  Risks associated with general anesthesia will be addressed by the Anesthesiology Department    Consent: Will be obtained in [facility] day of procedure    Pre-Procedure Instructions that were Reviewed with Patient:  NPO after midnight, Notified patient of time and date of procedure by CV , Transportation arrangements needed s/p procedure, Post-procedure follow up process, Sedation plan/orders and Pre-procedure letter was sent to pt by CV     Pre-Procedure Medication Instructions:  Instructions given to pt regarding anticoagulants: Eliquis- instructed to continue anticoagulation uninterrupted through their procedure  Instructions given to pt regarding antiarrhythmic medication: Sotalol; Started 8-16-17;  Pt to  continue through to Cardioversion  Instructions for medication, other than anticoagulants/antiarrhythmics listed above, given to pt: to take all morning medications with small sips of water, with the exception of OTC supplements and MVI    Allergies   Allergen Reactions     No Known Drug Allergies        Current Outpatient Prescriptions:      acetaminophen (TYLENOL) 325 MG tablet, Take 650 mg by mouth every 6 (six) hours as needed for pain., Disp: , Rfl:      albuterol (PROAIR HFA;PROVENTIL HFA;VENTOLIN HFA) 90 mcg/actuation inhaler, Inhale 2 puffs every 4 (four) hours as needed for wheezing., Disp: 1 Inhaler, Rfl: 0     aspirin 81 MG EC tablet, Take 1 tablet (81 mg total) by mouth daily., Disp: , Rfl: 0     diclofenac sodium (VOLTAREN) 1 % Gel, APPLY 2-4 GRAMS TO AFFECTED AREAS INDICATED IN DIRECTIONS, Disp: 100 g, Rfl: 1     diltiazem (CARDIZEM CD) 120 MG 24 hr capsule, TAKE 1 CAPSULE (120 MG TOTAL) BY MOUTH DAILY., Disp: 90 capsule, Rfl: 1     ELIQUIS 5 mg Tab tablet, TAKE 1 TABLET (5 MG TOTAL) BY MOUTH 2 (TWO) TIMES A DAY., Disp: 180 tablet, Rfl: 3     ketoconazole (NIZORAL) 2 % cream, Apply 1 application topically 2 (two) times a day as needed. , Disp: , Rfl:      losartan (COZAAR) 100 MG tablet, TAKE 1 TABLET BY MOUTH DAILY, Disp: 90 tablet, Rfl: prn     omeprazole (PRILOSEC) 20 MG capsule, Take 20 mg by mouth daily with supper. , Disp: , Rfl:      polyvinyl alcohol (LIQUIFILM TEARS) 1.4 % ophthalmic solution, Administer 1 drop to both eyes as needed for dry eyes., Disp: , Rfl:      sotalol (BETAPACE) 80 MG tablet, Take 1 tablet (80 mg total) by mouth 2 (two) times a day., Disp: 60 tablet, Rfl: 6

## 2021-06-13 NOTE — PROGRESS NOTES
"  Assessment and Plan:   Doing well, told to return to clinic if he ever gets these episodes of sudden fatigue so he can be evaluated at that time.    1. Hyperlipidemia, unspecified hyperlipidemia type  Check labs today.  - Basic Metabolic Panel  - HM2(CBC w/o Differential)  - Thyroid Stimulating Hormone (TSH)  - Hepatic Profile  - Lipid Cascade  - Urinalysis-UC if Indicated    2. Atypical atrial flutter  He remains anticoagulated.    3. Vitamin D deficiency  - Vitamin D, Total (25-Hydroxy)    4. Prostate Cancer  - PSA (Prostatic-Specific Antigen), Annual Screen      The patient's current medical problems were reviewed.    The following health maintenance schedule was reviewed with the patient and provided in printed form in the after visit summary:   Health Maintenance   Topic Date Due     INFLUENZA VACCINE RULE BASED (1) 08/01/2017     FALL RISK ASSESSMENT  11/08/2017     ADVANCE DIRECTIVES DISCUSSED WITH PATIENT  10/16/2020     TD 18+ HE  06/29/2021     PNEUMOCOCCAL POLYSACCHARIDE VACCINE AGE 65 AND OVER  Completed     PNEUMOCOCCAL CONJUGATE VACCINE FOR ADULTS (PCV13 OR PREVNAR)  Completed     ZOSTER VACCINE  Completed        Subjective:   Chief Complaint: Jaylan Alberto is an 77 y.o. male here for an Annual Wellness visit.   HPI: Jaylan comes in today for his annual wellness visit.  For the most part he feels well but on occasion he feels as though he has the \"flu\".  It sounds like this is some excessive fatigue and is unable to really pinpoint it further.  It will come on overnight and last for 3-4 days.  He has no other associated symptoms or signs.    Review of Systems:    Please see above.  The rest of the review of systems are negative for all systems.    Patient Care Team:  Rossy Aguilera MD as PCP - General  Orlin aDiley MD as Physician (Cardiology)  Girma Christy MD (Dermatology)  Cleve Lopez MD as Physician (Urology)     Patient Active Problem List   Diagnosis     Basal Cell Carcinoma Of " The Skin Of The Face     Prostate Cancer     Hyperlipidemia     Essential hypertension with goal blood pressure less than 140/90     Male Erectile Disorder     Esophageal Reflux     Osteoarthritis     History of snoring     Persistent atrial fibrillation     Status post catheter ablation of atrial fibrillation     Atypical atrial flutter     Past Medical History:   Diagnosis Date     Arthritis     Generalized     Atrial fibrillation      Cancer     Prostate and skin     CHF (congestive heart failure)     PMH     GERD (gastroesophageal reflux disease)      Hyperlipidemia      Hypertension      Status post catheter ablation of atrial fibrillation 5/26/2017    PVI May 26, 2017 (cryo-PVI + CFE + VIKKI line + CTI line)      Past Surgical History:   Procedure Laterality Date     CARDIAC CATHETERIZATION  Nov 2014     CARDIAC ELECTROPHYSIOLOGY MAPPING AND ABLATION  5/26/17    PVI     CARDIOVERSION      8/17/16, 10/10/16, 3/22/17     CARDIOVERSION  08/18/2017     CATARACT EXTRACTION       AK CORRJ HALLUX VALGUS W/SESMDC W/RESCJ PROX PHAL      Description: Bunion Correction By Cheilectomy;  Recorded: 06/29/2011;     AK EPHYS EVAL W/ABLATION SUPRAVENT ARRHYTHMIA  5/26/2017    Procedure: EP Ablation Atrial Flutter;  Surgeon: Orlin Dailey MD;  Location: [facility];  Service: Cardiology     AK EPHYS EVL TRNSPTL TX ATRIAL FIB ISOLAT PULM VEIN Left 5/26/2017    Procedure: EP Ablation PVI;  Surgeon: Orlin Dailey MD;  Location: [facility];  Service: Cardiology     AK EXCIS TENDON SHEATH LESION, HAND/FINGER      Description: Hand Excision Of A Tendon Cyst;  Recorded: 06/29/2011;  Comments: thumb     AK EXCISION MULTIPLE EXTERNAL PAPILLAE/TAGS ANUS      Description: Hemorrhoidectomy Excision Of External Tags;  Recorded: 06/16/2011;     AK REMOVAL OF TONSILS,<11 Y/O      Description: Tonsillectomy;  Recorded: 06/16/2011;     AK REPAIR OF HAMMERTOE,ONE      Description: Arthroplasty For Hammertoe;  Recorded:  2011;     ME TRANSURETHRAL ELEC-SURG PROSTATECTOM      Description: Transurethral Resection Of Prostate (TURP);  Recorded: 2011;  Comments: Dr. Lopez  with small foci of Pr CA present      Family History   Problem Relation Age of Onset     Stroke Mother       age 68     Depression Father       of suicide age 73     Atrial fibrillation Brother       Social History     Social History     Marital status:      Spouse name: N/A     Number of children: N/A     Years of education: N/A     Occupational History     Not on file.     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  He quit smoking cigarettes in .  He does drink some alcohol, but not excessively.  He tries to walk several times a week.  He has 3 children (one son is biological and his other son and daughter are adopted) and 2 grandchildren.      Current Outpatient Prescriptions   Medication Sig Dispense Refill     acetaminophen (TYLENOL) 325 MG tablet Take 650 mg by mouth every 6 (six) hours as needed for pain.       albuterol (PROAIR HFA;PROVENTIL HFA;VENTOLIN HFA) 90 mcg/actuation inhaler Inhale 2 puffs every 4 (four) hours as needed for wheezing. 1 Inhaler 0     aspirin 81 MG EC tablet Take 1 tablet (81 mg total) by mouth daily.  0     diclofenac sodium (VOLTAREN) 1 % Gel APPLY 2-4 GRAMS TO AFFECTED AREAS INDICATED IN DIRECTIONS 100 g 1     ELIQUIS 5 mg Tab tablet TAKE 1 TABLET (5 MG TOTAL) BY MOUTH 2 (TWO) TIMES A DAY. 180 tablet 3     ketoconazole (NIZORAL) 2 % cream Apply  "1 application topically 2 (two) times a day as needed.        losartan (COZAAR) 100 MG tablet TAKE 1 TABLET BY MOUTH DAILY 90 tablet prn     omeprazole (PRILOSEC) 20 MG capsule Take 20 mg by mouth daily with supper.        polyvinyl alcohol (LIQUIFILM TEARS) 1.4 % ophthalmic solution Administer 1 drop to both eyes as needed for dry eyes.       sotalol (BETAPACE) 80 MG tablet Take 1 tablet (80 mg total) by mouth 2 (two) times a day. 60 tablet 6     No current facility-administered medications for this visit.       Objective:   Vital Signs:   Visit Vitals     /64 (Patient Site: Left Arm, Patient Position: Sitting, Cuff Size: Adult Regular)     Pulse 60     Ht 5' 7.75\" (1.721 m)     Wt 204 lb (92.5 kg)     BMI 31.25 kg/m2        VisionScreening:  No exam data present     PHYSICAL EXAM  Wt Readings from Last 3 Encounters:   10/04/17 204 lb (92.5 kg)   09/25/17 206 lb (93.4 kg)   08/29/17 206 lb (93.4 kg)     General Appearance: Pleasant and alert  HEENT: Sclera are clear, tympanic membranes clear  Lungs: Normal respirations, clear to auscultation  Cardiac: Regular rate and rhythm with no appreciable murmur rub or gallop   Abdomen: Soft and nondistended  Extremities: No edema  Skin: No rashes  Neuro: Moves all extremities and has facial symmetry  Gait: Ambulates with a normal gait    Personalized prevention plan: Lifestyle interventions to promote self-management and wellness were discussed including good nutrition, ongoing physical activity, falls prevention and continuing with screening tests as recommended including flu shot and those listed in the health maintenance plan listed above.    Much or all of the text in this note was generated through the use of Dragon Dictate voice-to-text software. Errors in spelling or words which seem out of context are unintentional. Sound alike errors, in particular, may have escaped editing.      Assessment Results 10/4/2017   Activities of Daily Living No help needed "   Instrumental Activities of Daily Living No help needed   Get Up and Go Score Less than 12 seconds   Mini Cog Total Score 5   Some recent data might be hidden     A Mini-Cog score of 0-2 suggests the possibility of dementia, score of 3-5 suggests no dementia    Identified Health Risks:     The patient was provided with written information regarding signs of hearing loss.  He is at risk for falling and has been provided with information to reduce the risk of falling at home.  Information regarding advance directives (living crook), including where he can download the appropriate form, was provided to the patient via the AVS.

## 2021-06-13 NOTE — PROGRESS NOTES
1940  [phone] (home)  [phone] (mobile)    +++Important patient information for [facility]/Cath Lab staff : None+++    [facility] EP Cath Lab Procedure Order   Cardioversion:  Cardioversion    Diagnosis:  Aflutter  Anticipated Case Duration:  Standard  Scheduling Needs/Timeframe:  Please schedule for 10-3 or after    Current Device: None None  Device Company/Device Rep Needed for Procedure: None    Anesthesia:  General-CV Only  Research Protocol:  No    [facility] EP Cath Lab Prep   Ordering Provider: Andria Gastelum NP  Ordering Date: 9/26/2017  Orders Status: Intial order placed and Order set placed  EP NC Contact: Kelley Taylor RN    H&P:  PMD to Complete  PCP: Rossy Aguilera MD, [phone]    Pre-op Labs: N/A for procedure    Medical Records Pertinent for Procedure:  Carcioversion 8-18-17, Holter 9-5-17 SB;  ESTUARDO 8-1-17 SR, KAY 5-26-17 EF 55%, Echo n/a, EKG 9-25-17 Aflutter @ 88,  8-18- SR w/1AVB@ 61, 8-16-17 Afib @89 and Ablation Record PVI w/SWA 5-26-17    Patient Education:    Teach with Patient: Completed via phone prior to procedure    Risks Reviewed:     Cardioversion    >90% acute success rate, <10% failure to convert or   reverts shortly after cardioversion.    <1% embolic event of (CVA, pulmonary embolism, or   other site).    75% risk for superficial burn.  Risks associated with general anesthesia will be addressed by the Anesthesiology Department    Consent: Will be obtained in [facility] day of procedure    Pre-Procedure Instructions that were Reviewed with Patient:  NPO after midnight, Notified patient of time and date of procedure by CV , Transportation arrangements needed s/p procedure, Post-procedure follow up process, Sedation plan/orders and Pre-procedure letter was sent to pt by CV     Pre-Procedure Medication Instructions:  Instructions given to pt regarding anticoagulants: Eliquis- instructed to continue anticoagulation uninterrupted through their procedure  Instructions given to  pt regarding antiarrhythmic medication: Sotalol; Increase to 120 mg BID 2 days prior to procedure;  Holter post cardioversion, same day;  F/u with Andria in 2 weeks  Instructions for medication, other than anticoagulants/antiarrhythmics listed above, given to pt: to take all morning medications with small sips of water, with the exception of OTC supplements and MVI    Allergies   Allergen Reactions     No Known Drug Allergies        Current Outpatient Prescriptions:      acetaminophen (TYLENOL) 325 MG tablet, Take 650 mg by mouth every 6 (six) hours as needed for pain., Disp: , Rfl:      albuterol (PROAIR HFA;PROVENTIL HFA;VENTOLIN HFA) 90 mcg/actuation inhaler, Inhale 2 puffs every 4 (four) hours as needed for wheezing., Disp: 1 Inhaler, Rfl: 0     aspirin 81 MG EC tablet, Take 1 tablet (81 mg total) by mouth daily., Disp: , Rfl: 0     diclofenac sodium (VOLTAREN) 1 % Gel, APPLY 2-4 GRAMS TO AFFECTED AREAS INDICATED IN DIRECTIONS, Disp: 100 g, Rfl: 1     ELIQUIS 5 mg Tab tablet, TAKE 1 TABLET (5 MG TOTAL) BY MOUTH 2 (TWO) TIMES A DAY., Disp: 180 tablet, Rfl: 3     ketoconazole (NIZORAL) 2 % cream, Apply 1 application topically 2 (two) times a day as needed. , Disp: , Rfl:      losartan (COZAAR) 100 MG tablet, TAKE 1 TABLET BY MOUTH DAILY, Disp: 90 tablet, Rfl: prn     omeprazole (PRILOSEC) 20 MG capsule, Take 20 mg by mouth daily with supper. , Disp: , Rfl:      polyvinyl alcohol (LIQUIFILM TEARS) 1.4 % ophthalmic solution, Administer 1 drop to both eyes as needed for dry eyes., Disp: , Rfl:      sotalol (BETAPACE) 80 MG tablet, Take 1 tablet (80 mg total) by mouth 2 (two) times a day., Disp: 60 tablet, Rfl: 6

## 2021-06-13 NOTE — PROGRESS NOTES
Patient comes to clinic for rhythm evaluation per protocol.  Pt is post PVI w/Dr. Dailey 5/2017.   Pt remains on Eliquis and Sotalol 80 mg BID.  EKG shows Atrial Flutter @88.  Pt states he can feel that he is in afib, but is able to do all of his normal activities.  He states he has no shortnes of breath, chest pain, dizziness or lightheadedness.  He does feel that his energy level is lower and he is interested in maintaining SR if possible.  Reviewed EKG with Andria Gastelum.  Offer an increase in Sotalol to 120 mg BID 2 days prior to Cardioversion with Holter same day and f/u with Andria in 2 weeks.  Reviewed with patient via telephone and he would like to discuss with his wife further and will contact the clinic with a decision.

## 2021-06-14 NOTE — PROGRESS NOTES
[facility] Certification Request    December 1, 2017      Patient: Jaylan Alberto  MR Number: [id number]  YOB: 1940  Date of Visit: 12/1/2017      Dear Dr. Parag Becker:    Thank you for this referral.   We are seeing Jaylan Alberto for Physical Therapy of Chronic Left Hip Pain.    Medicare and/or Medicaid requires physician review and approval of the treatment plan. Please review the plan of care and verify that you agree with the therapy plan of care by co-signing this note.      Plan of Care  Authorization / Certification Start Date: 12/01/17  Authorization / Certification End Date: 01/25/18  Authorization / Certification Number of Visits: 12  Communication with: Referral Source  Patient Related Instruction: Nature of Condition;Treatment plan and rationale;Self Care instruction;Basis of treatment;Precautions;Next steps;Expected outcome  Times per Week: 1  Number of Weeks: 12  Number of Visits: 12  Discharge Planning: Independent HEP and self-management of symptoms  Precautions / Restrictions : Atrial Fibrillation, HTN, Knee OA, bilateral shoulder restrictions  Therapeutic Exercise: ROM;Strengthening  Neuromuscular Reeducation: core;balance/proprioception  Equipment: theraband    Goals:  Pt. will demonstrate/verbalize independence in self-management of condition in : 6 weeks  Pt. will be independent with home exercise program in : 6 weeks  Pt. will have improved quality of sleep: with less pain;waking less times/night;in 6 weeks;Comment  Comment:: left SL  Pt. will be able to walk : 60 minutes;with less pain;with less difficulty;for community mobility;in 6 weeks  Patient will increase : LEFS score;for improved quality of function;for improved quality of life;by _ points;in 6 weeks  by ___ points: 9      If you have any questions or concerns, please don't hesitate to call.    Sincerely,      Elizabeth Amador, PT        Physician recommendation:     ___ Follow therapist's  recommendation        ___ Modify therapy      *Physician co-signature indicates they certify the need for these services furnished within this plan and while under their care.          [facility]   Hip Initial Evaluation    Patient Name: Jaylan Alberto  Precautions/Restrictions:  Atrial Fibrillation, HTN, Knee OA, bilateral shoulder restrictions  Date of evaluation:12/1/2017  Referral Diagnosis: Chronic Left Hip Pain  Referring provider: Parag Becker MD  Visit Diagnosis:     ICD-10-CM    1. Chronic left hip pain M25.552     G89.29    2. Muscle weakness (generalized) M62.81    3. Decreased ROM of lower extremity M25.669        Assessment:        Skilled PT is required to modulate pain, increase ROM, strength and function through exercise and education.  Pt. is appropriate for skilled PT intervention as outlined in the Plan of Care (POC).  Pt. is a good candidate for skilled PT services to improve pain levels and function.    Goals:  Pt. will demonstrate/verbalize independence in self-management of condition in : 6 weeks  Pt. will be independent with home exercise program in : 6 weeks  Pt. will have improved quality of sleep: with less pain;waking less times/night;in 6 weeks;Comment  Comment:: left SL  Pt. will be able to walk : 60 minutes;with less pain;with less difficulty;for community mobility;in 6 weeks  Patient will increase : LEFS score;for improved quality of function;for improved quality of life;by _ points;in 6 weeks  by ___ points: 9    Patient's expectations/goals are fair due to chronicity of condition and arthritic state.    Barriers to Learning or Achieving Goals:  Chronicity of the problem.  Co-morbidities or other medical factors.  Atrial Fibrillation, HTN, Knee OA, bilateral shoulder restrictions           Plan / Patient Instructions:        Plan of Care:   Authorization / Certification Start Date: 12/01/17  Authorization / Certification End Date: 01/25/18  Authorization /  Certification Number of Visits: 12  Communication with: Referral Source  Patient Related Instruction: Nature of Condition;Treatment plan and rationale;Self Care instruction;Basis of treatment;Precautions;Next steps;Expected outcome  Times per Week: 1  Number of Weeks: 12  Number of Visits: 12  Discharge Planning: Independent HEP and self-management of symptoms  Precautions / Restrictions : Atrial Fibrillation, HTN, Knee OA, bilateral shoulder restrictions  Therapeutic Exercise: ROM;Strengthening  Neuromuscular Reeducation: core;balance/proprioception  Equipment: Girl Meets Dressd    The goals and plan of care were established in collaboration with the patient.    Plan for next visit: See 1x in 2 week to progress with proprioception/strength with standing SLR with ex band then likely DC on HEP     Subjective:        Social information:   Occupation:  Retired   Work Status:  Retired   Equipment Available: None    History of Present Illness:    Jaylan Alberto is a 77 y.o. male who presents to therapy today with chief complaints of bilat hip pain, left>right, gradual onset 6-12 months ago, originally starting with post trochanteric bursitis.  Xrays reveal hip arthitis;  Left 70% and right 60%.  Has a cortisone injection to left trochanteric bursa region.  Has had some sharp pain when walking and performing supine SLR/ clamshell from knee exercise instruction.  Pt demo's signs and sx consistent with DJD bilateral hips.     Pain Rating current:  1  Pain rating at worst: 5-6  Pain rating at best: 0  Pain description: Sharp with walking and left SL    Functional limitations:walk30 minutes, sleep left SL     Patient reports benefit from:  rest         Objective:      Note: Items left blank indicates the item was not performed or not indicated at the time of the evaluation.    Patient Outcome Measures :    Lower Extremity Functional Scale (_/80): 61   Scores range from 0-80, where a score of 80 represents maximum function. The minimal  clinically important difference is a positive change of 9 points.    Hip Examination  1. Chronic left hip pain     2. Muscle weakness (generalized)     3. Decreased ROM of lower extremity       Involved Side: Bilateral and left>right    Posture Observation:  Standing:  Good knee extension, bilat pes planus       Assistive Device: None  Gait Observation: antalgic, lurching  Lumbar Clearing: Not tested    Flexibility:  gastroc bilat min-mod loss, HS 75-80o    Palpation: left post trochanteric    Hip ROM:  Able to cross ankles without pain  Date: 12/1/2017     Hip ROM( ) AROM in degrees AROM in degrees AROM in degrees    Right Left Right Left Right Left   Hip Flexion (0-120 ) 114 106 with pain       Hip Abduction (0-45 ) 35 easier 30       Hip External Rotation (0-50 ) 40 35       Hip Internal Rotation (0-40 ) 15 stiff 5 stiff       Hip Extension (0-15 )          PROM in degrees PROM in degrees PROM in degrees    Right Left Right Left Right Left   Hip Flexion (0-120 )         Hip Abduction (0-45 )         Hip External Rotation (0-50 )         Hip Internal Rotation (0-40 )         Hip Extension (0-15 )           Hip/Knee Strength     Date:      Hip/Knee Strength (/5) MMT MMT MMT    Right Left Right Left Right Left   Hip Flexion         Hip Abduction         Hip Adduction         Hip Extension         Hip External Rotation         Hip Internal Rotation         Knee Extension         Knee Flexion               Hip Special Tests     OA Right (+/-) Left (+/-) Intra Articular Right (+/-) Left (+/-)   Hip Scour   STEPHANIE     Test Cluster  -Hip pain  -Hip IR <15   -Hip Flex <115  + + FADIR     Test Cluster  -Painful Hip IR  ->50 years old  -Morning Stiffness <60 min + + Passive Supine Rotation Test     Misc. Right (+/-) Left (+/-) Stinchfield Test (SLR Against Resistance)     Melvina CHURCHRIT     Leticia s   DIRI     Trendelenburg   Posterior Rim Impingement     SIJ Right (+/-) Left (+/-) Lateral Rim Impingement     SIJ Compression    "Other     SIJ Distraction   Other     POSH Test   Other     Sacral Thrust   Other       Today's HEP:  Exercises:  Exercise #1: SLR with abdominal set verbal instruction  Comment #1: Seated hip abduction isometric, 6x5\"-H  Comment #2: Bridge with knee pillow squeeze  Exercise #3: Prone lying with glut set and assisted knee flex, 5\"x5 _H  Comment #3: Hip ROM: towel assist hip flex, bilat hip abduction, ER/IR at supine  Exercise #4: 1/2 kneeling on chair, 10\"x1 each  Comment #4: Recommend trial of HP   Exercise #5: HIp abduction at SL 5x each-H    Tolerated evaluation and exercise instruction well.      Treatment Today     TREATMENT MINUTES COMMENTS   Evaluation 30 bilat hips   Self-care/ Home management     Manual therapy     Neuromuscular Re-education     Therapeutic Activity     Therapeutic Exercises 25 see flow sheet    Gait training     Modality__________________                Total 55    Blank areas are intentional and mean the treatment did not include these items.     PT Evaluation Code: (Please list factors)  Patient History/Comorbidities: see above  Examination: see above  Clinical Presentation: stable  Clinical Decision Making: low    Patient History/  Comorbidities Examination  (body structures and functions, activity limitations, and/or participation restrictions) Clinical Presentation Clinical Decision Making (Complexity)   No documented Comorbidities or personal factors 1-2 Elements Stable and/or uncomplicated Low   1-2 documented comorbidities or personal factor 3 Elements Evolving clinical presentation with changing characteristics Moderate   3-4 documented comorbidities or personal factors 4 or more Unstable and unpredictable High            Elizabeth Amador  12/1/2017  1:58 PM               "

## 2021-06-14 NOTE — PROGRESS NOTES
[facility] Daily Progress     Patient Name: Jaylan Alberto  PRECAUTIONS/RESTRICTIONS:  Atrial Fibrillation, HTN, Knee OA, bilateral shoulder restrictions  Date: 12/19/2017  Visit #: 2  PTA visit #:  0  Referral Diagnosis: Chronic Left Hip Pain  Referring provider: Parag Becker MD  Visit Diagnosis:     ICD-10-CM    1. Chronic left hip pain M25.552     G89.29    2. Muscle weakness (generalized) M62.81    3. Decreased ROM of lower extremity M25.669          Assessment:     HEP/POC compliance is  fair able to perform exercises until exacerbation of pain on 12/11/2017 then hasn't done any exercises since.then.  Patient demonstrates understanding/independence with home program.  Response to Intervention limited with increased pain since 12/11/2017.  Patient is appropriate to continue with skilled physical therapy intervention, as indicated by initial plan of care.   Possible trochanteric bursitis along with Hip OA    Goal Status:  Ongoing  Pt. will demonstrate/verbalize independence in self-management of condition in : 6 weeks  Pt. will be independent with home exercise program in : 6 weeks  Pt. will have improved quality of sleep: with less pain;waking less times/night;in 6 weeks;Comment  Comment:: left SL  Pt. will be able to walk : 60 minutes;with less pain;with less difficulty;for community mobility;in 6 weeks  Patient will increase : LEFS score;for improved quality of function;for improved quality of life;by _ points;in 6 weeks  by ___ points: 9    Plan / Patient Education:     Continue with initial plan of care.  Progress with home program as tolerated.   Reassess progress next visit with addition of cane and use of cold packs this week.     Subjective:     Pain Rating: unavailable    Pain increased on 12/11/2017 noting difficulty walking/left SL for sleep and pain about greater trochanter.  Managing with exercise cessation, use of Salon [place] and sleeping right SL now.  Better the past 2 nights.  Has  "had a history of pain left trochanter with left SL sleep and has received an injection in the past.  Also reported proximal quad tightness left>right which he attributes to biking 3 consecutive days.  Also reporting LBP above the belt level and he attributes that to performing sit-ups with extended legs.  The only exercise he was having difficulty due to pain was supine hip flex, using arms to assist rather that towel with contralateral leg extended.  Also some difficulty in lifting hips during bridge with added knee pillow squeeze.  Some right proximal anterior thigh discomfort with hip flexor stretch.   Response to PT/benefits:  None noted but feels some of the ROM exercises are beneficial as with hip abduction/IR/ER.    Continued difficulties:  Walk 30 minutes, sleep left SL     Objective:     Palpation:  Left>>right post trochanteric region, TFL and bilat proximal quad tightness/soreness and in glut/LB  Gait:  Antalgic with left + Trendelenburg    Today's Exercises:  OPT EXERCISES 12/19/2017   Exercise #1 SLR with abdominal set -reinstructed, 3x without pain-H   Comment #1 Seated hip abduction isometric, 6x5\"-verbal review-has tolerated well   Exercise #2    Comment #2 Bridge without pillowx5   Exercise #3    Comment #3 Hip ROM: towel assist hip flex, bilat hip abduction, ER/IR at supine-reinstructed to perform hip flex with contralateral leg bent   Exercise #4 1/2 kneeling on chair, 10\"x1 each-verbal review to stretch just slightly past the start of stretch   Comment #4    Exercise #5 HIp abduction at SL -verbal review   Comment #5 Abdominal set:  reinstructed but difficulty isolating without glut/HS recruitment.   Exercise #6 Recommend trial of CP, 15-20 minutes, 2-3x/day-H   Reinstructed to perform sit-ups with knees bent, arms reaching for knees and with abdominal set as patient was performing with  legs extended and feeling strain in back.      Treatment Today    TREATMENT MINUTES COMMENTS   Evaluation   "   Self-care/ Home management     Manual therapy     Neuromuscular Re-education     Therapeutic Activity     Therapeutic Exercises 20 See flow sheet   Gait training 10 Measured cane height and instructed patient in ambulation with SE cane on right and singular stair climbing with rail   Modality__________________                Total 30    Blank areas are intentional and mean the treatment did not include these items.       Elizabeth Amador  12/19/2017

## 2021-06-14 NOTE — PROGRESS NOTES
[facility]   Arrhythmia Clinic    Assessment/Plan:  Diagnoses and all orders for this visit:    Atypical atrial flutter and Persistent atrial fibrillation and with episode of atypical flutter in September he reverted back on his own within 3-4 days on sotalol 80 mg p.o. every 12 hours.  He takes his medication consistently.  He is not having any bradycardic symptoms and he and his wife are leaning towards staying on sotalol long-term.  I discussed that Dr. Dailey's plan had been to wean but clearly will not work with their schedule and probably not advise given findings at time of ablation.  I recommended that he stay on sotalol for perhaps a year and then we try to go very gradually wean him but we have to look for the right timing on their schedules given frequent travels common for them.  [id number] score of 3 and on Eliquis.  To call if symptoms of reoccurrence of atrial fibrillation flutter for a week or more or frequent.  To follow-up with Dr. Dailey in 6 months.  -     ECG 12 lead with MUSE      Essential hypertension with goal blood pressure less than 140/90 and well-controlled.    ______________________________________________________________________    Subjective:    I had the opportunity to see Jaylan Alberto at the [facility]. Jaylan Alberto is a 77 y.o. male and here for follow-up for persistent atrial fib and atypical atrial flutter.  Jaylan Alberto has a known history of persistent atrial fib and failed flecainide, Tigas and and amiodarone prior to pulmonary vein isolation ablation in May 2017 in which she had cryoablation all 5 pulmonary veins, CFE, VIKKI line and or CTI line as well.  He was noted to have severe left atrial fibrosis.  Amiodarone was discontinued following ablation.  He had reoccurrence of atrial fib early on and started on sotalol.  On sotalol 80 mg p.o. twice daily he denies any bradycardic symptoms.  2 weeks symptom monitor was done on sotalol and shows  average heart rate of 65 and only 22% of bradycardia.  Therefore he appears to be tolerating sotalol better post ablation and prior to ablation did not think his heart rate would be adequate with sotalol.  His heart rates are some limited with activity but continues to exercise and be active.  Abrahan and his wife have a lot of travel plans for this winter.  He denies other cardiac symptoms as well.  He remains directly symptomatic with atrial fibrillation and flutter as he was before.  He tells me if the heart rates are controlled in the 70s he generally can push through activities.  His atrial fibrillation and flutter were persistent prior to ablation and feels much better as burden significantly decreased with PVI.  Abrahan currently has an upper respiratory infection and coughing up secretions but clear to white.  This is ongoing for several days only.  ______________________________________________________________________    Problem List:  Patient Active Problem List   Diagnosis     Basal Cell Carcinoma Of The Skin Of The Face     Prostate Cancer     Hyperlipidemia     Essential hypertension with goal blood pressure less than 140/90     Male Erectile Disorder     Esophageal Reflux     Osteoarthritis     History of snoring     Persistent atrial fibrillation     Status post catheter ablation of atrial fibrillation     Atypical atrial flutter     Medical History:  Past Medical History:   Diagnosis Date     Arthritis     Generalized     Atrial fibrillation      Cancer     Prostate and skin     CHF (congestive heart failure)     PMH     GERD (gastroesophageal reflux disease)      Hyperlipidemia      Hypertension      Status post catheter ablation of atrial fibrillation 5/26/2017    PVI May 26, 2017 (cryo-PVI + CFE + VIKKI line + CTI line)     Surgical History:  Past Surgical History:   Procedure Laterality Date     CARDIAC CATHETERIZATION  Nov 2014     CARDIAC ELECTROPHYSIOLOGY MAPPING AND ABLATION  5/26/17    PVI      CARDIOVERSION      16, 10/10/16, 3/22/17     CARDIOVERSION  2017     CATARACT EXTRACTION       AK CORRJ HALLUX VALGUS W/SESMDC W/RESCJ PROX PHAL      Description: Bunion Correction By Cheilectomy;  Recorded: 2011;     AK EPHYS EVAL W/ABLATION SUPRAVENT ARRHYTHMIA  2017    Procedure: EP Ablation Atrial Flutter;  Surgeon: Orlin Dailey MD;  Location: [facility];  Service: Cardiology     AK EPHYS EVL TRNSPTL TX ATRIAL FIB ISOLAT PULM VEIN Left 2017    Procedure: EP Ablation PVI;  Surgeon: Orlni Dailey MD;  Location: [facility];  Service: Cardiology     AK EXCIS TENDON SHEATH LESION, HAND/FINGER      Description: Hand Excision Of A Tendon Cyst;  Recorded: 2011;  Comments: thumb     AK EXCISION MULTIPLE EXTERNAL PAPILLAE/TAGS ANUS      Description: Hemorrhoidectomy Excision Of External Tags;  Recorded: 2011;     AK REMOVAL OF TONSILS,<11 Y/O      Description: Tonsillectomy;  Recorded: 2011;     AK REPAIR OF HAMMERTOE,ONE      Description: Arthroplasty For Hammertoe;  Recorded: 2011;     AK TRANSURETHRAL ELEC-SURG PROSTATECTOM      Description: Transurethral Resection Of Prostate (TURP);  Recorded: 2011;  Comments: Dr. Lopez  with small foci of Pr CA present     Social History:  Social History   Substance Use Topics     Smoking status: Former Smoker     Packs/day: 1.00     Years: 30.00     Quit date: 1983     Smokeless tobacco: Never Used     Alcohol use 0.6 oz/week     1 Glasses of wine per week        Review of Systems: Review of Systems:   General: WNL  Eyes: WNL  Ears/Nose/Throat: WNL  Lungs: WNL  Heart: WNL  Stomach: WNL  Bladder: WNL  Muscle/Joints: WNL  Skin: WNL  Nervous System: WNL  Mental Health: WNL     Blood: WNL      Family History:  Family History   Problem Relation Age of Onset     Stroke Mother       age 68     Depression Father       of suicide age 73     Atrial fibrillation Brother   "        Allergies:  Allergies   Allergen Reactions     No Known Drug Allergies      Medications:  Current Outpatient Prescriptions   Medication Sig Dispense Refill     acetaminophen (TYLENOL) 325 MG tablet Take 650 mg by mouth every 6 (six) hours as needed for pain.       albuterol (PROAIR HFA;PROVENTIL HFA;VENTOLIN HFA) 90 mcg/actuation inhaler Inhale 2 puffs every 4 (four) hours as needed for wheezing. 1 Inhaler 0     aspirin 81 MG EC tablet Take 1 tablet (81 mg total) by mouth daily.  0     diclofenac sodium (VOLTAREN) 1 % Gel APPLY 2-4 GRAMS TO AFFECTED AREAS INDICATED IN DIRECTIONS 100 g 1     ELIQUIS 5 mg Tab tablet TAKE 1 TABLET (5 MG TOTAL) BY MOUTH 2 (TWO) TIMES A DAY. 180 tablet 3     ketoconazole (NIZORAL) 2 % cream Apply 1 application topically 2 (two) times a day as needed.        losartan (COZAAR) 100 MG tablet TAKE 1 TABLET BY MOUTH DAILY 90 tablet prn     omeprazole (PRILOSEC) 20 MG capsule Take 20 mg by mouth daily with supper.        polyvinyl alcohol (LIQUIFILM TEARS) 1.4 % ophthalmic solution Administer 1 drop to both eyes as needed for dry eyes.       sotalol (BETAPACE) 80 MG tablet Take 1 tablet (80 mg total) by mouth 2 (two) times a day. 60 tablet 6     No current facility-administered medications for this visit.        Objective:   Vital signs:  /60 (Patient Site: Left Arm, Patient Position: Sitting, Cuff Size: Adult Large)  Pulse 76  Resp 18  Ht 5' 7.75\" (1.721 m)  Wt 206 lb (93.4 kg)  BMI 31.55 kg/m2      Physical Exam:    GENERAL APPEARANCE: Alert, cooperative and in no acute distress.  HEENT: No scleral icterus. No Xanthelasma. Oral mucuos membranes pink and moist.  NECK: JVP Nl.   CHEST: Rhonchi in  bilateral bases.  CARDIOVASCULAR: S1, S2 without murmur ,clicks or rubs. Regular, regular.  Radial and posterior tibial pulses are intact and symetric.   EXTREMITIES: No cyanosis, clubbing or edema.    Results personally reviewed:  Results for orders placed during the hospital " encounter of 05/26/17   Echo KAY W Bubble [ECH12] 05/26/2017    Narrative   1.Left ventricle ejection fraction is decreased. The estimated left   ventricular ejection fraction is 55%.    2.Right Ventricle: Normal size and systolic function.    3.Moderately enlarged left atrium with spontaneous echo contrast seen,   however no thrombus appreciated in left atrial appendage which is normal   emptying velocities.    4.Mild to moderate mitral regurgitation and moderate aortic   insufficiency, with this jet directed along the anterior mitral valve   leaflet.    5.When compared to the previous study dated 3/3/2017, aortic   insufficiency is seen more clearly as is the mitral regurgitation. Overall   left ventricular ejection fraction appears to be mildly diminished   compared to prior.        CT pulmonary veins: Done 4/26/17  Left superior pulmonary vein: 20.1 x 12.8mm, area 16.9sq cm.  Left inferior pulmonary vein: 20.5 x 20.3mm, area 32.5sq cm.  Right superior pulmonary vein: 22.2 x 23.1mm, area 36.3sq cm.  Right middle pulmonary vein: 12.5 x 11.4mm, area 11.3sq cm.   Right inferior pulmonary vein: 19.5 x 18mm, area 26.3sq cm.   Esophagus: The esophagus is adjacent to the posterior side of the left atrial body.  Extracardiac findings:    1.  Hiatal hernia.     May 22, 2017 and August 26, 2017 twelve-lead EKG show atrial fibrillation with controlled ventricular response  September 25, 2017 twelve-lead EKG shows atypical atrial flutter with controlled ventricular response.  August 2017 2 weeks symptom monitor shows:  Impression:    Essentially normal multi-day patient activated monitor    The patient did not report any symptoms therefore correlation with the patient's clinical events cannot be made.    Indication for study was atrial fibrillation.  There was no demonstration of any atrial for ablation or flutter.    There was no sustained atrial or ventricular tachyarrhythmia    There is no profound bradycardia or  pauses.    Average heart rate was 65 throughout the time period.  Only 22% of bradycardia which is defined as heart rate less than 60.      Results for orders placed or performed in visit on 11/29/17   ECG 12 lead with MUSE   Result Value Ref Range    SYSTOLIC BLOOD PRESSURE  mmHg    DIASTOLIC BLOOD PRESSURE  mmHg    VENTRICULAR RATE 58 BPM    ATRIAL RATE 58 BPM    P-R INTERVAL 230 ms    QRS DURATION 110 ms    Q-T INTERVAL 454 ms    QTC CALCULATION (BEZET) 445 ms    P Axis 63 degrees    R AXIS 19 degrees    T AXIS 59 degrees    MUSE DIAGNOSIS       Sinus bradycardia with 1st degree A-V block  Otherwise normal ECG  When compared with ECG of 25-SEP-2017 13:32,  Sinus rhythm has replaced Atrial flutter  Vent. rate has decreased BY  30 BPM         TSH:   Lab Results   Component Value Date    TSH 1.49 10/04/2017     BNP:   Lab Results   Component Value Date     (H) 05/28/2017     BMP:  Lab Results   Component Value Date    CREATININE 0.78 10/04/2017    BUN 12 10/04/2017     (L) 10/04/2017    K 4.3 10/04/2017     10/04/2017    CO2 27 10/04/2017       This note has been dictated using voice recognition software. Any grammatical or context distortions are unintentional and inherent to the software.    FABIEN PICKENS RN, [facility]  [phone]

## 2021-06-15 PROBLEM — Z98.890 STATUS POST CATHETER ABLATION OF ATRIAL FIBRILLATION: Status: ACTIVE | Noted: 2017-05-26

## 2021-06-15 NOTE — PROGRESS NOTES
[facility] Daily Progress     Patient Name: Jaylan Alberto  PRECAUTIONS/RESTRICTIONS:  Atrial Fibrillation, HTN, Knee OA, bilateral shoulder restrictions  Date: 2018  Visit #: 3  PTA visit #:  0  Referral Diagnosis: Chronic Left Hip Pain  Referring provider: Parag Becker MD  Visit Diagnosis:     ICD-10-CM    1. Chronic left hip pain M25.552     G89.29    2. Muscle weakness (generalized) M62.81    3. Decreased ROM of lower extremity M25.669          Assessment:     HEP/POC compliance is  good performing 15 reps per exercise 5-6 days/week..  Patient demonstrates understanding/independence with home program.  Response to Intervention fair and less pain since last visit helped by using SE cane for 3 days to decrease strain.  Patient is benefitting from skilled physical therapy and is making steady progress toward functional goals.  Patient is appropriate to continue with skilled physical therapy intervention, as indicated by initial plan of care.   Possible trochanteric bursitis along with Hip OA    Goal Status:  Ongoing  Pt. will demonstrate/verbalize independence in self-management of condition in : Progressing toward  Pt. will be independent with home exercise program in : Progressing toward  Pt. will have improved quality of sleep: Not Met (sleeping primarily on right to decreased pressure on lt hip)  Comment:: left SL  Pt. will be able to walk : Partially met  Patient will increase : Not Met (score 59/80)  by ___ points: 9    Plan / Patient Education:     Continue with initial plan of care.  Progress with home program as tolerated.   Patient will call next week to schedule for 3 weeks out.  REassess ROM next.    Subjective:     Pain Ratin-2/10   Compliant with HEP 5-6 days /week and utilized cane for 3 days after last visit which offered relief.  Would like to resume biking 15-20 minutes.   Patient reports that he connected with Dr. Cm office stating that pain he was experiencing  "symptoms likely of trochanteric bursitis and that with physical exercise it should improve in 6-8 weeks.     Continues to reported proximal quad tightness left.  Quad stretch on chair is helpful.  Segmental bridging is ok.  Response to PT/benefits:  Less pain in general and no pain with exercises.    Continued difficulties:  Walk 30 minutes, sleep left SL, stair climbing 1 flight and crossing left foot over right knee.    Objective:     Palpation:  Left ITB, TFL and lateral quad   Gait:  Antalgic with left + Trendelenburg    Today's Exercises:  OPT EXERCISES 1/5/2018   Exercise #4 1/2 kneeling on chair, 10\"x1 each-verbal review to stretch just slightly past the start of stretch-verbal review.   Comment #4    Exercise #5    Comment #5    Exercise #6    Comment #6    Exercise #7 ITB stretch at right SL, 10\"x5-H; better stretch at SL right than standing position, experiencing some knee crunching. with stretch.   Comment #7 Verbal instruction for ITB/quad STM by spouse 5-8 minutes   Exercise #8 resume biking, discussed correct position of hip/knee bend for biking and begin at 5 minutes to assess tolerance and to stretch after warm-up on bike.     Treatment Today    TREATMENT MINUTES COMMENTS   Evaluation     Self-care/ Home management     Manual therapy 8 At right SL with knee pillow:  STM left ITB and lateral quad   Neuromuscular Re-education     Therapeutic Activity     Therapeutic Exercises 15 See flow sheet   Gait training  Measured cane height and instructed patient in ambulation with SE cane on right and singular stair climbing with rail   Modality__________________                Total 23    Blank areas are intentional and mean the treatment did not include these items.       Elizabeth Amador  1/5/2018    "

## 2021-06-15 NOTE — PROGRESS NOTES
[facility] Re-Certification Request    March 27, 2018      Patient: Jaylan Alberto  MR Number: [id number]  YOB: 1940  Date of Visit: 1/26/2018  Onset Date:  6/2017  Date of Eval: 12/1/2017    Dear Dr. Yosef Mason:    As you may recall, we have been seeing Jaylan Alberto for Physical Therapy of Left hip/knee pain.    For therapy to continue, Medicare and/or Medicaid requires periodic physician review of the treatment plan. Please review the summary of the patient's progress and our plan for continued therapy, and verify  that you agree therapy should continue by entering certification dates at the bottom of this note and co-signing this note.    Plan of Care  Authorization / Certification Start Date: 01/26/18  Authorization / Certification End Date: 04/27/18  Authorization / Certification Number of Visits: 6  Communication with: Referral Source  Patient Related Instruction: Nature of Condition;Self Care instruction;Treatment plan and rationale;Basis of treatment;Precautions;Next steps;Expected outcome  Times per Week: 1  Number of Weeks: 6  Number of Visits: 6  Discharge Planning: Independent HEP and self-mamagement of symptoms  Precautions / Restrictions : Atrial Fibrillation, HTN, left knee OA, bilat shoulder restrictions  Therapeutic Exercise: ROM;Stretching;Strengthening  Neuromuscular Reeducation: balance/proprioception;core  Manual Therapy: myofascial release;soft tissue mobilization;joint mobilization  Modalities: ultrasound  Equipment: theraband    Goal  Pt. will demonstrate/verbalize independence in self-management of condition in : Met  Pt. will be independent with home exercise program in : Met  Pt. will have improved quality of sleep: Progressing toward (SL tolerance for sleep 1-2 hours and is able to get to sleep)  Pt. will be able to walk : Progressing toward  Patient will increase : Not Met (Initial score 59/80  current 58/80)  Comment: current score:  60/80-basically  unchanged  Pt will: be able to golf with less pain in 12 weeks.      If you have any questions or concerns, please don't hesitate to call.    Sincerely,      Elizabeth Amador, PT        Physician recommendation:     ___ Follow therapist's recommendation        ___ Modify therapy      *Physician co-signature indicates they certify the need for these services furnished within this plan and while under their care.          [facility] Daily Progress     Patient Name: Jaylan Alberto  PRECAUTIONS/RESTRICTIONS:  Atrial Fibrillation, HTN, Knee OA, bilateral shoulder restrictions  Date: 1/26/2018  Visit #: 4  PTA visit #:  0  Referral Diagnosis: Chronic Left Hip Pain  Referring provider: Parag Becker MD  Visit Diagnosis:     ICD-10-CM    1. Chronic left hip pain M25.552     G89.29    2. Muscle weakness (generalized) M62.81    3. Decreased ROM of lower extremity M25.669          Assessment:     HEP/POC compliance is  good performing 15 reps per exercise 5-6 days/week..  Patient demonstrates understanding/independence with home program.  Response to Intervention fair and less pain since last visit helped by using SE cane for 3 days to decrease strain.  Patient is benefitting from skilled physical therapy and is making steady progress toward functional goals.  Patient is appropriate to continue with skilled physical therapy intervention, as indicated by initial plan of care.   Possible trochanteric bursitis along with Hip OA  LEFS Outcome Measure did not show a statistically significant change.    Goal Status:    Pt. will demonstrate/verbalize independence in self-management of condition in : Met  Pt. will be independent with home exercise program in : Met  Pt. will have improved quality of sleep: Partially met ([id number] minutes and will awaken from)  Comment:: left SL  Pt. will be able to walk : Partially met (able to walk 20-30 minutes and can have some pain by the end)  Patient will increase :  "Comment  by ___ points: 9  Comment: current score:  60/80-basically unchanged    Plan / Patient Education:     Patient will f/u with a new orthopedic provider as Dr. Becker is no longer in the patients network of providers.  Patient will call after seen by new ortho but he feels he is able to continue with the present program independently.    Subjective:     Pain Ratin-2/10 Continues to feel soreness post trochanteric region but also deep in the hip and into the groin with walking.  Compliant with HEP 5-6 days /week and has not used his cane since last seen.   Hip abduction is the only exercise that can be painful.  He finds that warming up on the bike prior to exercises is helpful in making exercises more comfortable.    Response to PT/benefits: Hip feels stronger and LE feels more flexible from the exercises.  He has greater tolerance for left SL at sleep and performs car transfers with more normal entry. He can walk 20 minutes(<1 mile) up to 30 minutes(> 1 mile) but can experience some pain.  Biking is not problematic.        Continued difficulties: Stair climbing 1 flight and crossing left foot over right knee and rising from a low chair.    \"PT is helping.\"  LEFS Outcome Measure:  Initial 61/80  Current 60/80    Objective:     Palpation: Post trochanteric and distal ITB to below knee.  Gait:  Antalgic with left + Trendelenburg  Hip ROM:  Able to cross ankles without pain           Date: 2017      Hip ROM( ) AROM in degrees AROM in degrees AROM in degrees     Right Left Right Left Right Left   Hip Flexion (0-120 ) 114 106 with pain   112        Hip Abduction (0-45 ) 35 easier 30    33 end range pain       Hip External Rotation (0-50 ) 40 35   35        Hip Internal Rotation (0-40 ) 15 stiff 5 stiff    5 stiff       Hip Extension (0-15 )                     Today's Exercises:  OPT EXERCISES 2018   Exercise #1 SLR with abdominal set -reinstructed, 3x without pain-H   Comment #1 Seated hip " "abduction isometric, 6x5\"-verbal review-has tolerated well   Exercise #2    Comment #2 Bridge without pillowx5   Exercise #3 Prone lying with glut set and assisted knee flex, 5\"x5 -H   Comment #3 Hip ROM: towel assist hip flex, bilat hip abduction, ER/IR at supine-reinstructed to perform hip flex with contralateral leg bent   Exercise #4 1/2 kneeling on chair, 10\"x1 each-verbal review to stretch just slightly past the start of stretch-verbal review.   Comment #4    Exercise #5 HIp abduction at SL -verbal review and instructed to push down into bed with upper arm, pull belly in and DF ankles for core engagement   Comment #5 Abdominal set:  reinstructed as patient was performing anterior pelvic tilt   Exercise #6    Comment #6    Exercise #7 ITB stretch at right SL, 10\"x5-H; better stretch at SL right than standing position, experiencing some knee crunching. with stretch.   Comment #7    Exercise #8    Comment #8 Glut stretch on left bringing left knee to right shoulder/ crossing midline.   Exercise #9 Instructed in proper wall slide, being 12\" away from wall.  Patient had been performing independently with heels closer to wall.     Continues to have pain with and range hip abduction and resisted hip adduction.  Treatment Today    TREATMENT MINUTES COMMENTS   Evaluation     Self-care/ Home management     Manual therapy  At right SL with knee pillow:  STM left ITB and lateral quad   Neuromuscular Re-education     Therapeutic Activity     Therapeutic Exercises 20 See flow sheet; formalized HEP   Gait training  Measured cane height and instructed patient in ambulation with SE cane on right and singular stair climbing with rail   Modality__________________     Hip ROM 8          Total 28    Blank areas are intentional and mean the treatment did not include these items.       Elizabeth Amador  1/26/2018    "

## 2021-06-16 PROBLEM — M16.9 HIP OSTEOARTHRITIS: Status: ACTIVE | Noted: 2018-05-30

## 2021-06-16 PROBLEM — I34.0 MILD MITRAL REGURGITATION: Status: ACTIVE | Noted: 2018-12-14

## 2021-06-16 PROBLEM — I35.1 MODERATE AORTIC INSUFFICIENCY: Status: ACTIVE | Noted: 2018-12-14

## 2021-06-16 NOTE — PROGRESS NOTES
[facility] Daily Progress     Patient Name: Jaylan Alberto  PRECAUTIONS/RESTRICTIONS:  Atrial Fibrillation, HTN, Knee OA, bilateral shoulder restrictions  Date: 3/2/2018  Visit #: 5  PTA visit #:  0  Referral Diagnosis: Chronic Left Hip Pain  Referring provider: Yosef Mason MD  Visit Diagnosis:     ICD-10-CM    1. Chronic left hip pain M25.552     G89.29    2. Muscle weakness (generalized) M62.81    3. Decreased ROM of lower extremity M25.669          Assessment:     HEP/POC compliance is  good performing 15 reps per exercise 5-6 days/week;rides bike 3x/week.  Patient demonstrates understanding/independence with home program.  Response to Intervention fair with generally greater ease and less pain with left SL for sleep, walking and rolling over in bed  Patient is benefitting from skilled physical therapy and is making steady progress toward functional goals.  Patient is appropriate to continue with skilled physical therapy intervention, as indicated by initial plan of care.   Possible trochanteric bursitis along with Hip OA  LEFS Outcome Measure did not show a statistically significant change.    Goal Status:    Pt. will demonstrate/verbalize independence in self-management of condition in : Met  Pt. will be independent with home exercise program in : Met  Pt. will have improved quality of sleep: Progressing toward (SL tolerance for sleep 1-2 hours and is able to get to sleep)  Pt. will be able to walk : Progressing toward  Patient will increase : Not Met (Initial score 59/80  current 58/80)  Comment: current score:  60/80-basically unchanged  Pt will: be able to golf with less pain in 12 weeks.    Plan / Patient Education:     Order from Dr. Mason to continue up to 12 visits for chronic hip pain/bursitis.  Plan to continue weekly for 3 more visits then reassess needs and progress. Resume ITB STM, MFR to left iliopsoas, facilitated HS stretch review all exercises,     Subjective:     Pain Rating:  "Left hip pain 1-1.5; left knee 2/10.  At its worst hip pain is 4 and knee 4-6. Noting stiffness at anterior hip joint but feels deep.  Overall better with less pain and increased function for walking and left SL sleep tolerance.  Had cortisone injection to left knee and was better but noted increased discomfort while on vacation with dryer weather and more frequent stair climbing finding descent leading with right more painful on left knee.  Able to walk 20-30 min daily and rolling over in bed is easier  He continues to use Tylenol and Motrin and analgesic cream for pain modulation.  Compliant with HEP 5-6 days /week and has not used his cane since last seen.   Stand tolerance 30 minutes, sit tolerance 2-3 hours, walk tolerance 60 minutes, sleep interruption 1-2x/night.  He finds that warming up on the bike prior to exercises is helpful in making exercises more comfortable.    Response to PT/benefits: Hip feels stronger and LE feels more flexible from the exercises.  He has greater tolerance for left SL at sleep and performs car transfers with more normal entry. He can walk 20 minutes(<1 mile) up to 30 minutes(> 1 mile) but can experience some pain.  Biking is not problematic.        Continued difficulties: Stair climbing 1 flight especially down and crossing left foot over right knee and rising from a low chair.    \"PT is helping.\"  LEFS Outcome Measure:  Initial 61/80  Current 58/80    Objective:     Palpation: Post trochanteric and distal ITB to below knee.  Gait:  Antalgic with left + Trendelenburg  Hip ROM:  Able to cross ankles without pain           Date: 12/1/2017 1/26/2018   3/2/2018   Hip ROM( ) AROM in degrees AROM in degrees AROM in degrees     Right Left Right Left Right Left   Hip Flexion (0-120 ) 114 106 with pain   112     110 mild twinge   Hip Abduction (0-45 ) 35 easier 30    33 end range pain   35 stretch    Hip External Rotation (0-50 ) 40 35   35    35    Hip Internal Rotation (0-40 ) 15 stiff 5 " "stiff    5 stiff    5   Hip Extension (0-15 )                 Left Knee ROM 0-130o with stretch      Today's Exercises:  OPT EXERCISES 3/2/2018   Exercise #1 SLR with abdominal set -reinstructed, 3x without pain-contineu   Comment #1    Exercise #2    Comment #2 Bridge without pillowx5-continue   Exercise #3    Comment #3 Hip ROM: towel assist hip flex, bilat hip abduction, ER/IR at supine-reinstructed to perform hip flex with contralateral leg bent-contine   Exercise #4 1/2 kneeling on chair, 10\"x1 each-verbal review to stretch just slightly past the start of stretch-verbal review.resume   Comment #4    Exercise #5 HIp abduction at SL -verbal review and instructed to push down into bed with upper arm, pull belly in and DF ankles for core engagement-continue   Comment #5 Abdominal set:  reinstructed as patient was performing anterior pelvic tilt-continue   Exercise #6    Comment #6    Exercise #7 ITB stretch at right SL, 10\"x5-H; better stretch at SL right than standing position, experiencing some knee crunching. with stretch.-continue   Comment #7    Exercise #8    Comment #8    Exercise #9 Instructed in proper wall slide, being 12\" away from wall.  Patient had been performing independently with heels closer to wall.-reinstructed to hold 5\" and feet hip ;width apart-continue   Comment #9 Heel slide 2x-H   Exercise #10 quad set, 5\"x5-H   Comment #10 Ankle DF/PF, 3x-H   Exercise #11 Standing Hip extension-5x-H   Comment #11 Supine QL stretch, 5x-H   Exercise #12 TRail left hip flexor stretch at standing with right arm slide up wall.     Continues to have pain with end range hip abduction and resisted hip adduction.  Treatment Today    TREATMENT MINUTES COMMENTS   Evaluation     Self-care/ Home management 10 Discussed status since last session   Manual therapy 5 Supine:  MFR left iliopsoas   Neuromuscular Re-education     Therapeutic Activity     Therapeutic Exercises 30 See flow sheet;    Gait training   "   Modality__________________     Hip ROM 12          Total 57    Blank areas are intentional and mean the treatment did not include these items.       Elizabeth Amador  3/2/2018

## 2021-06-16 NOTE — PROGRESS NOTES
[facility] Discharge Summary  Patient Name: Jaylan Alberto  Date: 5/15/2018  Referral Diagnosis: Chronic Left Hip Pain  Referring provider: Yosef Mason MD  Visit Diagnosis:   1. Chronic left hip pain     2. Muscle weakness (generalized)     3. Decreased ROM of lower extremity         Goals:  Pt. will demonstrate/verbalize independence in self-management of condition in : Met  Pt. will be independent with home exercise program in : Met  Pt. will have improved quality of sleep: Progressing toward (SL tolerance for sleep 1-2 hours and is able to get to sleep)  Pt. will be able to walk : Progressing toward  Patient will increase : Not Met;Comment (Initial score 58/80; current score 55/80)  Pt will: be able to golf with less pain in 12 weeks.    Patient was seen for 7 visits from 12/1/2017 to 3/27/2018 with 0 missed appointments.  The patient met/making adequate progress toward goals and has demonstrated understanding of and independence in the home program for self-care, and progression to next steps.  He will initiate contact if questions or concerns arise.  Patient received a home program Hip ROM and strengthening  No further therapy is required at this time.    Therapy will be discontinued at this time.  The patient will need a new referral to resume.    Thank you for your referral.  Elizabeth L Perry  5/15/2018  8:32 AM      [facility] Daily Progress     Patient Name: Jaylan Alberto  PRECAUTIONS/RESTRICTIONS:  Atrial Fibrillation, HTN, Knee OA, bilateral shoulder restrictions  Date: 3/27/2018  Visit #: 7  PTA visit #:  0  Referral Diagnosis: Chronic Left Hip Pain  Referring provider: Yosef Mason MD  Visit Diagnosis:     ICD-10-CM    1. Chronic left hip pain M25.552     G89.29    2. Muscle weakness (generalized) M62.81    3. Decreased ROM of lower extremity M25.669          Assessment:     HEP/POC compliance is  good performing 15 reps per exercise 5-6 days/week;rides bike 3x/week.   "He would like to begin some pool exercises..  Patient demonstrates understanding/independence with home program.  Response to Intervention fair but he comfortable with the exercise program and sees how they could benefit his condition.  He does report periodic exacerbations or \"bad days\" but feels his ability to flex his left hip is more difficult than when starting PT recently, attributing it to the weather.   LEFS Outcome Measure did not show a statistically significant change.    Goal Status:    Pt. will demonstrate/verbalize independence in self-management of condition in : Met  Pt. will be independent with home exercise program in : Met  Pt. will have improved quality of sleep: Progressing toward (SL tolerance for sleep 1-2 hours and is able to get to sleep)  Pt. will be able to walk : Progressing toward  Patient will increase : Not Met;Comment (Initial score 61/80; current score 55/80)  Pt will: be able to golf with less pain in 12 weeks-untired    Plan / Patient Education:     Patient feels he is able to continue with the HEP independently and will begin some exercise in the pool.  He will contact PT if he has any questions or concerns.    Subjective:     Pain Rating: today 1-2/10; on 3/25 and 3/26 pain was 3-4/10 when standing, stating they were not good days.   He feels his left hip flexion is worse than when he began PT, at least as of today.   Noting stiffness at anterior hip joint but feels deep.  Stiffness>pain  Overall better with less pain and increased function for walking and left SL sleep tolerance.  Had cortisone injection to left knee and was better but noted increased discomfort while on vacation with dryer weather and more frequent stair climbing finding descent leading with right more painful on left knee.  He continues to use Tylenol and Motrin and analgesic cream for pain modulation.  Compliant with HEP 5-6 days /week and generally does not use his cane.  He finds that warming up on the bike " "prior to exercises is helpful in making exercises more comfortable.    Walking tolerance 15-30 minutes.  Left knee bothers more than hip.  Pain/difficulty with sit to standing due to left knee pain primarily..  Hip exercises are helpful and feels more stiff>pain.  Able to tolerate left SL for sleep in regards to left hip pain.    Response to PT/benefits: Hip feels stronger and LE feels more flexible from the exercises in general except with left hip flex today.  He has greater tolerance for left SL at sleep and performs car transfers with more normal entry. He can walk 15 minutes(<1 mile) up to 30 minutes(> 1 mile) but can experience some pain.  Biking is not problematic.        Continued difficulties: Stair climbing 1 flight especially down and crossing left foot over right knee and rising from a low chair.    \"PT is helping.\"  LEFS Outcome Measure:  Initial 61/80  Current 55/80    Objective:     HS tightness right>left.  Palpation: Post trochanteric and distal ITB to below knee but less.  Gait:  Antalgic with left + Trendelenburg but less.  Transfer with greater ease.    Hip ROM:  Todays ROM:  Flex 105o, Abduction 30o, ER 40o, IR 10o,            Date: 12/1/2017 1/26/2018   3/2/2018   Hip ROM( ) AROM in degrees AROM in degrees AROM in degrees     Right Left Right Left Right Left   Hip Flexion (0-120 ) 114 106 with pain   112     110 mild twinge   Hip Abduction (0-45 ) 35 easier 30    33 end range pain   35 stretch    Hip External Rotation (0-50 ) 40 35   35    35    Hip Internal Rotation (0-40 ) 15 stiff 5 stiff    5 stiff    5   Hip Extension (0-15 )                 Left Knee ROM 2-125o with stretch      Exercises:  Exercise #1: SLR with abdominal set -reinstructed, 3x without pain-contineu  Comment #1: Seated hip abduction isometric, 6x5\"-verbal review-has tolerated well  Comment #2: Bridge without pillowx5-continue  Exercise #3: Prone lying with glut set and assisted knee flex, 5\"x5 _H  Comment #3: Hip ROM: " "towel assist hip flex, bilat hip abduction, ER/IR at supine-reinstructed to perform hip flex with contralateral leg bent-contine  Exercise #4: 1/2 kneeling on chair, 10\"x1 each-verbal review to stretch just slightly past the start of stretch-verbal review.resume  Exercise #5: HIp abduction at SL -verbal review and instructed to push down into bed with upper arm, pull belly in and DF ankles for core engagement-continue  Comment #5: Abdominal set:  reinstructed as patient was performing anterior pelvic tilt-continue  Exercise #7: ITB stretch at right SL, 10\"x5-H; better stretch at SL right than standing position, experiencing some knee crunching. with stretch.-continue  Comment #7: Verbal instruction for ITB/quad STM by spouse 5-8 minutes  Exercise #8: resume biking, discussed correct position of hip/knee bend for biking and begin at 5 minutes to assess tolerance and to stretch after warm-up on bike.  Comment #8: Glut stretch on left bringing left knee to right shoulder/ crossing midline.  Exercise #9: Instructed in proper wall slide, being 12\" away from wall.  Patient had been performing independently with heels closer to wall.-reinstructed to hold 5\" and feet hip ;width apart-continue  Comment #9: Heel slide 2x-H  Exercise #10: quad set, 5\"x5-H  Comment #10: Ankle DF/PF, 3x-H  Exercise #11: Standing Hip extension-5x-H  Comment #11: Supine QL stretch, 5x-H  Exercise #12: TRail left hip flexor stretch at standing with right arm slide up wall.  Comment #12: closed chain HS-verbal review; issued purple band  Exercise #13: Hip flex isometric on ipsilateral and contrateral resistance, 5\"x1 each-J      Tolerated treatment well.    Treatment Today    TREATMENT MINUTES COMMENTS   Evaluation     Self-care/ Home management  Discussed status since last session   Manual therapy 8  at right SL:  STM ITB to trochanter.   Neuromuscular Re-education  Contract relax for facilitation of improved HS length, 3x8\" each   Therapeutic " Activity     Therapeutic Exercises 10  Brief review of HEP.  See flow sheet   Gait training     Modality__________________     Hip ROM 10          Total 28    Blank areas are intentional and mean the treatment did not include these items.       Elizabeth Amador  3/27/2018

## 2021-06-17 NOTE — ANESTHESIA CARE TRANSFER NOTE
Last vitals:   Vitals:    04/04/18 1320   Pulse:    Resp:    Temp:    SpO2: 100%     Patient's level of consciousness is drowsy  Spontaneous respirations: yes  Maintains airway independently: yes  Dentition unchanged: yes  Oropharynx: oropharynx clear of all foreign objects    QCDR Measures:  ASA# 20 - Surgical Safety Checklist: WHO surgical safety checklist completed prior to induction  PQRS# 430 - Adult PONV Prevention: NA - Not adult patient, not GA or 3 or more risk factors NOT present  ASA# 8 - Peds PONV Prevention: NA - Not pediatric patient, not GA or 2 or more risk factors NOT present  PQRS# 424 - Aarti-op Temp Management: NA - MAC anesthesia or case < 60 minutes  PQRS# 426 - PACU Transfer Protocol:NA - Patient did not go to PACU  ASA# 14 - Acute Post-op Pain: NA - Patient under age 10y or did not go to PACU

## 2021-06-17 NOTE — PROGRESS NOTES
[facility] HEART [facility]   Arrhythmia Clinic    Assessment/Plan:  Diagnoses and all orders for this visit:    Atypical atrial flutter seen today on twelve-lead EKG and symptomatic.  Rate is fairly controlled but reporting ventricular response of 100 or over with minimal activity.  His last cardioversion was in August 2017 and seems reasonable to repeat cardioversion with no change in antiarrhythmic.  Discussed with Abrahan that I am not thinking of tapering him off his sotalol as clearly proven that he needs antiarrhythmic.  He is in agreement with this plan.  I discussed cardioversion and instructions for this.  See after visit summary for more details.  He will likely have cardioversion with me this Wednesday or Thursday.  He will get a call to verify date and time of cardioversion.  To call if any symptoms of reoccurrence of atrial fibrillation flutter before follow-up with Dr. Dailey in 6-8 weeks.  If he has early reoccurrence of atrial fib I will need to reevaluate heart rate after cardioversion to see if he would tolerate increase in sotalol dose.  If not, I would consider putting him back on amiodarone and want Dr. Dailey's opinion on antiarrhythmics.  I do not believe that Abrahan is a candidate for repeat ablation and want to clarify this as well.    -     EP Cardioversion External; Future; Expected date: 4/2/18    Persistent atrial fibrillation history and [id number] score of 3 and on Eliquis.  No missed doses in the last 3 weeks.  He is eligible for cardioversion.  -     ECG Clinic - Today    Essential hypertension with goal blood pressure less than 140/90 and well-controlled.    Other orders  -     Verify informed consent for Elective Cardioversion; Standing  -     Vital signs; Standing  -     NPO 8 hours prior to procedure; Standing  -     Notify Anesthesiologist -; Standing  -     Verify 100% compliance with oral anti-coagulant over the past 3 weeks verbally with the patient prior to cardioversion. Notify  "procedularlist if missed doses.; Standing  -     Emergency equipment at bedside; Standing  -     Oxygen nasal cannula; Standing  -     Inpatient consult to Anesthesiology Reason for Consult? cardioversion; Did you contact the consulting MD? No; Consult priority: Today (urgent); Communication for MD: No phone communication necessary for now; Standing  -     Insert and maintain IV; Standing  -     lidocaine (PF) 10 mg/mL (1 %) injection 0.1-0.3 mL (XYLOCAINE-MPF); Inject 0.1-0.3 mL under the skin as needed (for IV insertion).  -     sodium chloride 0.9%; Infuse 25 mL/hr into a venous catheter continuous.  -     Saline lock IV; Standing  -     sodium chloride flush 3 mL (NS); Infuse 3 mL into a venous catheter Line Care.    40 minutes were spent in face-to-face counseling regarding above diagnoses and options for treatment.    ______________________________________________________________________    Subjective:    I had the opportunity to see Jaylan Alberto at the [facility]. Jaylan Alberto is a 78 y.o. male and here for follow-up for persistent atrial atypical atrial flutter since Friday, March 30.  Abrahan is symptomatic with fatigue and does not feel right.  He feels \"crummy\". Jaylan Alberto has a known history of persistent atrial fib and failed flecainide, Tikosyn and and amiodarone prior to pulmonary vein isolation ablation in May 2017 in which she had cryoablation all 5 pulmonary veins, CFE, VIKKI line and or CTI line as well.  He was noted to have severe left atrial fibrosis.  Amiodarone was discontinued following ablation.  He had reoccurrence of atrial fib/fl early on and started on sotalol.  Abrahan is traveling next week and would like a cardioversion this week as not feeling well.  He tells me he overdid activity and feels that that was his trigger for this particular episode.  He denies any missed doses of Eliquis or sotalol.  This visit will serve as history and physical for cardioversion.  See " problem list for more details.  Medical, past medical, surgical and social history reviewed and updated.  Meds and allergies reviewed.     ______________________________________________________________________    Problem List:  Patient Active Problem List   Diagnosis     Basal Cell Carcinoma Of The Skin Of The Face     Prostate Cancer     Hyperlipidemia     Essential hypertension with goal blood pressure less than 140/90     Male Erectile Disorder     Esophageal Reflux     Osteoarthritis     History of snoring     Persistent atrial fibrillation     Status post catheter ablation of atrial fibrillation     Atypical atrial flutter     Medical History:  Past Medical History:   Diagnosis Date     Arthritis     Generalized     Atrial fibrillation      Cancer     Prostate and skin     CHF (congestive heart failure)     PMH     GERD (gastroesophageal reflux disease)      Hyperlipidemia      Hypertension      Status post catheter ablation of atrial fibrillation 5/26/2017    PVI May 26, 2017 (cryo-PVI + CFE + VIKKI line + CTI line)     Surgical History:  Past Surgical History:   Procedure Laterality Date     CARDIAC CATHETERIZATION  Nov 2014     CARDIAC ELECTROPHYSIOLOGY MAPPING AND ABLATION  5/26/17    PVI     CARDIOVERSION      8/17/16, 10/10/16, 3/22/17     CARDIOVERSION  08/18/2017     CATARACT EXTRACTION       MO CORRJ HALLUX VALGUS W/SESMDC W/RESCJ PROX PHAL      Description: Bunion Correction By Cheilectomy;  Recorded: 06/29/2011;     MO EPHYS EVAL W/ABLATION SUPRAVENT ARRHYTHMIA  5/26/2017    Procedure: EP Ablation Atrial Flutter;  Surgeon: Orlin Dailey MD;  Location: [facility] Cath Lab;  Service: Cardiology     MO EPHYS EVL TRNSPTL TX ATRIAL FIB ISOLAT PULM VEIN Left 5/26/2017    Procedure: EP Ablation PVI;  Surgeon: Orlin Dailey MD;  Location: [facility] Cath Lab;  Service: Cardiology     MO EXCIS TENDON SHEATH LESION, HAND/FINGER      Description: Hand Excision Of A Tendon Cyst;  Recorded: 06/29/2011;   Comments: thumb     AK EXCISION MULTIPLE EXTERNAL PAPILLAE/TAGS ANUS      Description: Hemorrhoidectomy Excision Of External Tags;  Recorded: 2011;     AK REMOVAL OF TONSILS,<13 Y/O      Description: Tonsillectomy;  Recorded: 2011;     AK REPAIR OF HAMMERTOE,ONE      Description: Arthroplasty For Hammertoe;  Recorded: 2011;     AK TRANSURETHRAL ELEC-SURG PROSTATECTOM      Description: Transurethral Resection Of Prostate (TURP);  Recorded: 2011;  Comments: Dr. Lopez 2007 with small foci of Pr CA present     Social History:  Social History   Substance Use Topics     Smoking status: Former Smoker     Packs/day: 1.00     Years: 30.00     Quit date: 1983     Smokeless tobacco: Never Used     Alcohol use 0.6 oz/week     1 Glasses of wine per week        Review of Systems: Review of Systems:   General: WNL  Eyes: WNL  Ears/Nose/Throat: WNL  Lungs: WNL  Heart: Irregular Heartbeat  Stomach: WNL  Bladder: Frequent Urination at Night  Muscle/Joints: Joint Pain  Skin: WNL  Nervous System: WNL  Mental Health: WNL     Blood: WNL      Family History:  Family History   Problem Relation Age of Onset     Stroke Mother       age 68     Depression Father       of suicide age 73     Atrial fibrillation Brother          Allergies:  Allergies   Allergen Reactions     No Known Drug Allergies      Medications:  Current Outpatient Prescriptions   Medication Sig Dispense Refill     acetaminophen (TYLENOL) 325 MG tablet Take 650 mg by mouth every 6 (six) hours as needed for pain.       albuterol (PROAIR HFA;PROVENTIL HFA;VENTOLIN HFA) 90 mcg/actuation inhaler Inhale 2 puffs every 4 (four) hours as needed for wheezing. 1 Inhaler 0     aspirin 81 MG EC tablet Take 1 tablet (81 mg total) by mouth daily.  0     clotrimazole-betamethasone (LOTRISONE) lotion Apply to rash twice daily as needed 60 mL prn     diclofenac sodium (VOLTAREN) 1 % Gel APPLY 2-4 GRAMS TO AFFECTED AREAS INDICATED IN DIRECTIONS 100 g 1  "    ELIQUIS 5 mg Tab tablet TAKE 1 TABLET (5 MG TOTAL) BY MOUTH 2 (TWO) TIMES A DAY. 180 tablet 3     ketoconazole (NIZORAL) 2 % cream Apply 1 application topically 2 (two) times a day as needed.        lidocaine (LIDODERM) 5 % Remove & Discard patch within 12 hours or as directed by MD 30 patch prn     losartan (COZAAR) 100 MG tablet TAKE 1 TABLET BY MOUTH DAILY 90 tablet 1     omeprazole (PRILOSEC) 20 MG capsule Take 20 mg by mouth daily with supper.        polyvinyl alcohol (LIQUIFILM TEARS) 1.4 % ophthalmic solution Administer 1 drop to both eyes as needed for dry eyes.       sotalol (BETAPACE) 80 MG tablet TAKE 1 TABLET (80 MG TOTAL) BY MOUTH 2 (TWO) TIMES A DAY. 180 tablet 1     No current facility-administered medications for this visit.        Objective:   Vital signs:  /64 (Patient Site: Right Arm, Patient Position: Sitting, Cuff Size: Adult Regular)  Pulse (!) 102  Resp 18  Ht 5' 7.75\" (1.721 m)  Wt 208 lb (94.3 kg)  BMI 31.86 kg/m2      Physical Exam:    GENERAL APPEARANCE: Alert, cooperative and in no acute distress.  HEENT: No scleral icterus. No Xanthelasma. Oral mucuos membranes pink and moist.  NECK: JVP Nl.   CHEST: clear to auscultation  CARDIOVASCULAR: S1, S2 without murmur ,clicks or rubs. Irregular, irregular.  Radial and posterior tibial pulses are intact and symetric.   EXTREMITIES: No cyanosis, clubbing or edema.    Results personally reviewed:  Results for orders placed during the hospital encounter of 05/26/17   Echo KAY W Bubble [ECH12] 05/26/2017    Narrative   1.Left ventricle ejection fraction is decreased. The estimated left   ventricular ejection fraction is 55%.    2.Right Ventricle: Normal size and systolic function.    3.Moderately enlarged left atrium with spontaneous echo contrast seen,   however no thrombus appreciated in left atrial appendage which is normal   emptying velocities.    4.Mild to moderate mitral regurgitation and moderate aortic   insufficiency, with " this jet directed along the anterior mitral valve   leaflet.    5.When compared to the previous study dated 3/3/2017, aortic   insufficiency is seen more clearly as is the mitral regurgitation. Overall   left ventricular ejection fraction appears to be mildly diminished   compared to prior.         CT pulmonary veins: Done 4/26/17  Left superior pulmonary vein: 20.1 x 12.8mm, area 16.9sq cm.  Left inferior pulmonary vein: 20.5 x 20.3mm, area 32.5sq cm.  Right superior pulmonary vein: 22.2 x 23.1mm, area 36.3sq cm.  Right middle pulmonary vein: 12.5 x 11.4mm, area 11.3sq cm.   Right inferior pulmonary vein: 19.5 x 18mm, area 26.3sq cm.   Esophagus: The esophagus is adjacent to the posterior side of the left atrial body.  Extracardiac findings:    1.  Hiatal hernia.      May 22, 2017 and August 26, 2017 twelve-lead EKG show atrial fibrillation with controlled ventricular response  September 25, 2017 twelve-lead EKG shows atypical atrial flutter with controlled ventricular response.  August 2017 2 weeks symptom monitor shows:  Impression:    Essentially normal multi-day patient activated monitor    The patient did not report any symptoms therefore correlation with the patient's clinical events cannot be made.    Indication for study was atrial fibrillation.  There was no demonstration of any atrial for ablation or flutter.    There was no sustained atrial or ventricular tachyarrhythmia    There is no profound bradycardia or pauses.  Average heart rate was 65 throughout the time period.  Only 22% of bradycardia which is defined as heart rate less than 60.       Results for orders placed or performed in visit on 04/02/18   ECG Clinic - Today   Result Value Ref Range    SYSTOLIC BLOOD PRESSURE  mmHg    DIASTOLIC BLOOD PRESSURE  mmHg    VENTRICULAR RATE 102 BPM    ATRIAL RATE 288 BPM    P-R INTERVAL  ms    QRS DURATION 110 ms    Q-T INTERVAL 360 ms    QTC CALCULATION (BEZET) 469 ms    P Axis  degrees    R AXIS -6 degrees     T AXIS 45 degrees    MUSE DIAGNOSIS       Atrial flutter with variable A-V block  Abnormal ECG  When compared with ECG of 29-NOV-2017 08:01,  Atrial flutter has replaced Sinus rhythm  Vent. rate has increased BY  44 BPM         TSH:   Lab Results   Component Value Date    TSH 1.49 10/04/2017     BNP:   Lab Results   Component Value Date     (H) 05/28/2017     BMP:  Lab Results   Component Value Date    CREATININE 0.78 10/04/2017    BUN 12 10/04/2017     (L) 10/04/2017    K 4.3 10/04/2017     10/04/2017    CO2 27 10/04/2017       This note has been dictated using voice recognition software. Any grammatical or context distortions are unintentional and inherent to the software.    FABIEN PICKENS RN, [facility] HEART [facility]  [phone]

## 2021-06-17 NOTE — ANESTHESIA POSTPROCEDURE EVALUATION
Patient: Jaylan Alberto  * No procedures listed *  Anesthesia type: general    [place] [facility] [place]als:   Vitals:    04/04/18 1400   BP: 119/65   Pulse: 74   Resp:    Temp:    SpO2: 99%     Post vital signs: stable  Level of consciousness: awake and responds to simple questions  Post-anesthesia pain: pain controlled  Post-anesthesia nausea and vomiting: no  Pulmonary: unassisted, return to baseline  Cardiovascular: stable and blood pressure at baseline  Hydration: adequate  Anesthetic events: no    QCDR Measures:  ASA# 11 - Aarti-op Cardiac Arrest: ASA11B - Patient did NOT experience unanticipated cardiac arrest  ASA# 12 - Aarti-op Mortality Rate: ASA12B - Patient did NOT die  ASA# 13 - PACU Re-Intubation Rate: NA - No ETT / LMA used for case  ASA# 10 - Composite Anes Safety: ASA10A - No serious adverse event    Additional Notes:

## 2021-06-17 NOTE — ANESTHESIA PREPROCEDURE EVALUATION
Anesthesia Evaluation      Patient summary reviewed   No history of anesthetic complications     Airway   Mallampati: II  Neck ROM: full   Pulmonary - normal exam    breath sounds clear to auscultation  (+) a smoker  (-) sleep apnea                         Cardiovascular   (+) hypertension, dysrhythmias, CHF, , hypercholesterolemia,     (-) angina, murmur  ECG reviewed  Rhythm: irregular  Rate: normal,    no murmur   ROS comment: 1.Left ventricle ejection fraction is decreased. The estimated left   ventricular ejection fraction is 55%.    2.Right Ventricle: Normal size and systolic function.    3.Moderately enlarged left atrium with spontaneous echo contrast seen,   however no thrombus appreciated in left atrial appendage which is normal   emptying velocities.    4.Mild to moderate mitral regurgitation and moderate aortic   insufficiency, with this jet directed along the anterior mitral valve   leaflet.    5.When compared to the previous study dated 3/3/2017, aortic   insufficiency is seen more clearly as is the mitral regurgitation. Overall   left ventricular ejection fraction appears to be mildly diminished   compared to prior.         Neuro/Psych - negative ROS     Endo/Other    (+) arthritis,      Comments: ED    GI/Hepatic/Renal    (+) GERD,       Comments: Prostate CA          Dental - normal exam                        Anesthesia Plan  Planned anesthetic: general mask and total IV anesthesia    ASA 3   Induction: intravenous   Anesthetic plan and risks discussed with: patient and spouse    Post-op plan: routine recovery

## 2021-06-17 NOTE — PROGRESS NOTES
[id number]  [phone] (home)  [phone] (mobile)    +++Important patient information for [facility]/Cath Lab staff : PT IS ON ELIQUIS AND SOTALOL+++    Arrival time given to pt: PT WAS TOLD TO ARRIVE AT 12 00    [facility] EP Cath Lab Procedure Order   Cardioversion:    Cardioversion     PT IS ON ELIQUIS AND NO DOSES MISSED CLEARED BY GATO ARAUJO CNP      Diagnosis:  AF  Anticipated Case Duration:  Standard  Scheduling Needs/Timeframe:  PT IS SET FOR WED 4/4/2018 AT 12 00 WITH GATO ARAUJO CNP    Current Device: None None  Device Company/Device Rep Needed for Procedure: None    Anesthesia:  General-CV Only  Research Protocol:  No    [facility] EP Cath Lab Prep   Ordering Provider: Gato Araujo NP  Ordering Date: 4/2/2018  Orders Status: Intial order placed and Order set placed  EP NC Contact: Natalie Reza LPN    H&P:  Compled by GATO ARAUJO CNP on 4/2/2018  PCP: Rossy Aguilera MD, [phone]    Pre-op Labs: N/A for procedure    Medical Records Pertinent for Procedure:  N/A    Patient Education:    PT HAS A  FOR PROCEDURE  PT  HAS BEEN INSTRUCTED TO HOLD ANY VITAMINS, MINERALS, CALCIUM, IRON OR SUPPLEMENTS THE MORNING OF PROCEDURE  PT IS ON ELIQUIS AND NO MISSED DOSES  PT IS ON SOTALOL  PT WAS INSTRUCTED TO BATHE OR SHOWER BEFORE COMING IN  PT INSTRUCTED TO LEAVE JEWELRY AT HOME  PT IS TO SEE DR REAL IN 6-8 WEEKS AND  HAS BEEN NOTIFIED      Teach with Patient: Completed via phone on 4/3/2018  Risks Reviewed:     Cardioversion    >90% acute success rate, <10% failure to convert or   reverts shortly after cardioversion.    <1% embolic event of (CVA, pulmonary embolism, or   other site).    75% risk for superficial burn.  Risks associated with general anesthesia will be addressed by the Anesthesiology Department    Consent: Will be obtained in [facility] day of procedure    Pre-Procedure Instructions that were Reviewed with Patient:  NPO after midnight, Remove all jewelry prior to coming in for procedure,  Shower prior to arrival, Notified patient of time and date of procedure by CV , Transportation arrangements needed s/p procedure, Post-procedure follow up process and Sedation plan/orders    Pre-Procedure Medication Instructions:  Instructions given to pt regarding anticoagulants: Eliquis- instructed to continue anticoagulation uninterrupted through their procedure  Instructions given to pt regarding antiarrhythmic medication: Sotalol; Pt instructed to continue medication prior to procedure  Instructions for medication, other than anticoagulants/antiarrhythmics listed above, given to pt: to take all morning medications with small sips of water, with the exception of OTC supplements and MVI    Allergies   Allergen Reactions     No Known Drug Allergies        Current Outpatient Prescriptions:      acetaminophen (TYLENOL) 325 MG tablet, Take 650 mg by mouth every 6 (six) hours as needed for pain., Disp: , Rfl:      albuterol (PROAIR HFA;PROVENTIL HFA;VENTOLIN HFA) 90 mcg/actuation inhaler, Inhale 2 puffs every 4 (four) hours as needed for wheezing., Disp: 1 Inhaler, Rfl: 0     aspirin 81 MG EC tablet, Take 1 tablet (81 mg total) by mouth daily., Disp: , Rfl: 0     clotrimazole-betamethasone (LOTRISONE) lotion, Apply to rash twice daily as needed, Disp: 60 mL, Rfl: prn     diclofenac sodium (VOLTAREN) 1 % Gel, APPLY 2-4 GRAMS TO AFFECTED AREAS INDICATED IN DIRECTIONS, Disp: 100 g, Rfl: 1     ELIQUIS 5 mg Tab tablet, TAKE 1 TABLET (5 MG TOTAL) BY MOUTH 2 (TWO) TIMES A DAY., Disp: 180 tablet, Rfl: 3     ketoconazole (NIZORAL) 2 % cream, Apply 1 application topically 2 (two) times a day as needed. , Disp: , Rfl:      lidocaine (LIDODERM) 5 %, Remove & Discard patch within 12 hours or as directed by MD, Disp: 30 patch, Rfl: prn     losartan (COZAAR) 100 MG tablet, TAKE 1 TABLET BY MOUTH DAILY, Disp: 90 tablet, Rfl: 1     omeprazole (PRILOSEC) 20 MG capsule, Take 20 mg by mouth daily with supper. , Disp: , Rfl:       polyvinyl alcohol (LIQUIFILM TEARS) 1.4 % ophthalmic solution, Administer 1 drop to both eyes as needed for dry eyes., Disp: , Rfl:      sotalol (BETAPACE) 80 MG tablet, TAKE 1 TABLET (80 MG TOTAL) BY MOUTH 2 (TWO) TIMES A DAY., Disp: 180 tablet, Rfl: 1

## 2021-06-17 NOTE — PROGRESS NOTES
Preoperative Exam    Scheduled Procedure: Left hip replacement  Surgery Date:  5/30/18  Surgery Location: [facility], fax [phone]    Surgeon:  Dr. Yosef Mason    Assessment/Plan:     1. Preop exam for internal medicine  He appears to medically stable for the proposed surgery.  He will hold his Eliquis for 5 days prior to surgery, aspirin for 7 days prior to surgery, losartan the night before surgery and take his sotalol the a.m. of surgery.  He should be able to return home with home care after surgery recovery.  - Hemoglobin  - Basic Metabolic Panel     Surgical Procedure Risk: Intermediate (reported cardiac risk generally 1-5%)  Have you had prior anesthesia?: Yes  Have you or any family members had a previous anesthesia reaction:  No  Do you or any family members have a history of a clotting or bleeding disorder?: No  Cardiac Risk Assessment: no increased risk for major cardiac complications    Patient approved for surgery with general or local anesthesia.        Functional Status: Independent  Patient plans to recover at home with family.     Subjective:      Jaylan Alberto is a 78 y.o. male who presents for a preoperative consultation.  He has been having problems with his left hip and his left knee.  His hip is been bothering him for about the last year and is gotten progressively worse.  He is failed cortisone and intrabursal injections.  He uses ibuprofen twice daily despite being on Eliquis.  He also uses Lidoderm patches and diclofenac gel to help relieve the pain.  He now has end-stage arthritis within the hip and is scheduled to have a hip replacement.  He may need a knee replacement in the future as well.  He has been well otherwise and did have a cardioversion early last month which was successful in keeping him out of atrial fibrillation.    All other systems reviewed and are negative, other than those listed in the HPI.    Pertinent History  Do you have difficulty breathing or chest  pain after walking up a flight of stairs: Yes: Deconditioned  History of obstructive sleep apnea: No  Steroid use in the last 6 months: No  Frequent Aspirin/NSAID use: Yes: We will be holding  Prior Blood Transfusion: No  Prior Blood Transfusion Reaction: No  If for some reason prior to, during or after the procedure, if it is medically indicated, would you be willing to have a blood transfusion?:  There is no transfusion refusal.    Current Outpatient Prescriptions   Medication Sig Dispense Refill     acetaminophen (TYLENOL) 325 MG tablet Take 650 mg by mouth every 6 (six) hours as needed for pain.       aspirin 81 MG EC tablet Take 1 tablet (81 mg total) by mouth daily.  0     clotrimazole-betamethasone (LOTRISONE) lotion Apply to rash twice daily as needed 60 mL prn     diclofenac sodium (VOLTAREN) 1 % Gel APPLY 2-4 GRAMS TO AFFECTED AREAS INDICATED IN DIRECTIONS 100 g 1     ELIQUIS 5 mg Tab tablet TAKE 1 TABLET (5 MG TOTAL) BY MOUTH 2 (TWO) TIMES A DAY. 180 tablet 3     ketoconazole (NIZORAL) 2 % cream Apply 1 application topically 2 (two) times a day as needed.        lidocaine (LIDODERM) 5 % Remove & Discard patch within 12 hours or as directed by MD 30 patch prn     losartan (COZAAR) 100 MG tablet TAKE 1 TABLET BY MOUTH DAILY 90 tablet 1     omeprazole (PRILOSEC) 20 MG capsule Take 20 mg by mouth daily with supper.        polyvinyl alcohol (LIQUIFILM TEARS) 1.4 % ophthalmic solution Administer 1 drop to both eyes as needed for dry eyes.       sotalol (BETAPACE) 80 MG tablet TAKE 1 TABLET (80 MG TOTAL) BY MOUTH 2 (TWO) TIMES A DAY. 180 tablet 1     albuterol (PROAIR HFA;PROVENTIL HFA;VENTOLIN HFA) 90 mcg/actuation inhaler Inhale 2 puffs every 4 (four) hours as needed for wheezing. 1 Inhaler 0     No current facility-administered medications for this visit.         Allergies   Allergen Reactions     No Known Drug Allergies        Patient Active Problem List   Diagnosis     Basal Cell Carcinoma Of The Skin Of  The Face     Prostate Cancer     Hyperlipidemia     Essential hypertension with goal blood pressure less than 140/90     Male Erectile Disorder     Esophageal Reflux     Osteoarthritis     History of snoring     Persistent atrial fibrillation     Status post catheter ablation of atrial fibrillation     Atypical atrial flutter       Past Medical History:   Diagnosis Date     Arthritis     Generalized     Atrial fibrillation      Cancer     Prostate and skin     CHF (congestive heart failure)     PMH     GERD (gastroesophageal reflux disease)      Hyperlipidemia      Hypertension      Status post catheter ablation of atrial fibrillation 5/26/2017    PVI May 26, 2017 (cryo-PVI + CFE + VIKKI line + CTI line)       Past Surgical History:   Procedure Laterality Date     CARDIAC CATHETERIZATION  Nov 2014     CARDIAC ELECTROPHYSIOLOGY MAPPING AND ABLATION  5/26/17    PVI     CARDIOVERSION      8/17/16, 10/10/16, 3/22/17     CARDIOVERSION  08/18/2017     CARDIOVERSION  04/04/2018     CATARACT EXTRACTION       NC CORRJ HALLUX VALGUS W/SESMDC W/RESCJ PROX PHAL      Description: Bunion Correction By Cheilectomy;  Recorded: 06/29/2011;     NC EPHYS EVAL W/ABLATION SUPRAVENT ARRHYTHMIA  5/26/2017    Procedure: EP Ablation Atrial Flutter;  Surgeon: Orlin Dailey MD;  Location: [facility];  Service: Cardiology     NC EPHYS EVL TRNSPTL TX ATRIAL FIB ISOLAT PULM VEIN Left 5/26/2017    Procedure: EP Ablation PVI;  Surgeon: Orlin Dailey MD;  Location: [facility] Cath [facility];  Service: Cardiology     NC EXCIS TENDON SHEATH LESION, HAND/FINGER      Description: Hand Excision Of A Tendon Cyst;  Recorded: 06/29/2011;  Comments: thumb     NC EXCISION MULTIPLE EXTERNAL PAPILLAE/TAGS ANUS      Description: Hemorrhoidectomy Excision Of External Tags;  Recorded: 06/16/2011;     NC REMOVAL OF TONSILS,<11 Y/O      Description: Tonsillectomy;  Recorded: 06/16/2011;     NC REPAIR OF HAMMERTOE,ONE      Description: Arthroplasty For  "Ira;  Recorded: 06/16/2011;     WV TRANSURETHRAL ELEC-SURG PROSTATECTOM      Description: Transurethral Resection Of Prostate (TURP);  Recorded: 06/29/2011;  Comments: Dr. 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  He quit smoking cigarettes in 1982.  He does drink some alcohol, but not excessively.  He tries to walk several times a week.  He has 3 children (one son is biological and his other son and daughter are adopted) and 2 grandchildren.             Objective:     Vitals:    05/01/18 1006   BP: 114/60   Pulse: 60   SpO2: 97%   Weight: 205 lb (93 kg)   Height: 5' 9\" (1.753 m)         Physical Exam:  EYES: Eyelids, conjunctiva, and sclera were normal. Pupils were normal.   HEAD, EARS, NOSE, MOUTH, AND THROAT: Head is atraumatic, ears and canals are normal with normal tympanic membranes.  Nose mouth and throat are without lesions.  NECK: Neck appearance was normal. There were no neck masses and the thyroid was not enlarged.  RESPIRATORY: Normal respirations.  Lung sounds were equal bilaterally.  CARDIOVASCULAR: Heart rate and rhythm were normal.  S1 and S2 were normal and there were no extra sounds or murmurs. There was no peripheral edema.  GASTROINTESTINAL: The abdomen was soft and nondistended.   "   MUSCULOSKELETAL: Skeletal configuration was normal and muscle mass was normal for age. Joint appearance was overall normal.  LYMPHATIC: There were no enlarged nodes palpable.  SKIN/HAIR/NAILS: Skin color was normal.  No rashes.  NEUROLOGIC: The patient was alert and oriented.  Speech was normal.  There is no facial asymmetry.   PSYCHIATRIC:  Mood and affect were normal.     EKG: EKG from April 4 after cardioversion is reviewed showing normal sinus rhythm with no acute ST-T changes, last echocardiogram showed ejection fraction of 59% in March 2017    Patient Instructions       Hold all supplements, aspirin and NSAIDs for 7 days prior to surgery.    Follow your surgeon's direction on when to stop eating and drinking prior to surgery.    Your surgeon will be managing your pain after your surgery.      Hold your losartan the night before surgery.    Hold your Eliquis 5 days before surgery.    Take the sotalol the AM of surgery.      EKG: As above, personally reviewed by me showing normal sinus rhythm with no acute ST-T changes    Labs:  Recent Results (from the past 24 hour(s))   Hemoglobin    Collection Time: 05/01/18 10:27 AM   Result Value Ref Range    Hemoglobin 13.1 (L) 14.0 - 18.0 g/dL       Immunization History   Administered Date(s) Administered     Hep A, Adult IM (19yr & older) 10/09/2000, 02/19/2008     Hep A, historic 10/09/2000     Influenza high dose, seasonal 10/06/2015, 10/03/2016, 10/04/2017     Influenza, Seasonal, Inj PF IIV3 08/28/2009, 09/30/2010     Influenza, inj, historic,unspecified 10/20/2011     Influenza, seasonal,quad inj 6-35 mos 09/11/2012, 10/14/2013     Pneumo Conj 13-V (2010&after) 10/15/2014     Pneumo Polysac 23-V 09/02/2003, 08/31/2010, 08/31/2010     Td, Adult, Absorbed 03/03/1999, 02/19/2008     Td,adult,historic,unspecified 03/03/1999     Tdap 06/29/2011     Tetanus toxoid, historic, unspecified 11/19/2005     Typhoid, Inj, Inactive 02/19/2008     ZOSTER, LIVE 06/29/2011            Electronically signed by Rossy Aguilera MD 05/01/18 10:03 AM

## 2021-06-18 NOTE — ANESTHESIA PROCEDURE NOTES
Spinal Block    Patient location during procedure: OR  Start time: 5/30/2018 10:32 AM  End time: 5/30/2018 10:36 AM  Reason for block: primary anesthetic    Staffing:  Performing  Anesthesiologist: BATSHEVA DELGADILLO    Preanesthetic Checklist  Completed: patient identified, risks, benefits, and alternatives discussed, timeout performed, consent obtained, airway assessed, oxygen available, suction available, emergency drugs available and hand hygiene performed  Spinal Block  Patient position: sitting  Prep: ChloraPrep  Patient monitoring: heart rate, cardiac monitor, continuous pulse ox and blood pressure  Approach: midline  Location: L3-4  Injection technique: single-shot  Needle type: pencil-tip   Needle gauge: 24 G      Additional Notes:  Negative paresthesia or heme    Lot [id number]  Expiration 10/20

## 2021-06-18 NOTE — PROGRESS NOTES
[facility] HEART [facility]  Arrhythmia Clinic  Orlin Dailey    Referring:      Assessment:         Persistent atrial fibrillation: The patient is 1 year out from his ablation procedure to treat his persistent atrial fibrillation.  He was also noted to have severe left atrial fibrosis at the time of his ablation procedure.  The patient is had 2 cardioversions for recurrent atrial fibrillation and one episode which spontaneously converted in October 2017.  The patient remains on low-dose sotalol therapy and also remains on oral anticoagulant therapy given his elevated [id number] risk score.    Hypertension: The patient's blood pressure is at target on his current medical therapy.    DJD: The patient is scheduled for left hip replacement tomorrow.  He has been off aspirin therapy for 7 days and off Eliquis therapy for 5 days.      Recommendations:    Resume oral anticoagulant (Eliquis) and aspirin as soon as possible postoperatively.    If the patient has recurrent atrial fibrillation he would need to wait 3 weeks prior to being cardioverted.    No change in his other medication regimen.    Follow-up in the A. fib clinic with the EP nurse practitioner in 6 months.      Patient Active Problem List   Diagnosis     Basal Cell Carcinoma Of The Skin Of The Face     Prostate Cancer     Hyperlipidemia     Essential hypertension with goal blood pressure less than 140/90     Male Erectile Disorder     Esophageal Reflux     Osteoarthritis     History of snoring     Persistent atrial fibrillation     Status post catheter ablation of atrial fibrillation     Atypical atrial flutter       Subjective:  Jaylan Alberto (78 y.o. male) returns to the arrhythmia clinic for interval reevaluation of his persistent atrial fibrillation post ablation.  The patient is 1 year out from his ablation procedure which was complex to treat his atrial fibrillation.  The patient is had 3 episodes of sustained atrial fibrillation following  his ablation procedure.  2 of those of required cardioversion most recently in April of this year.  There were no particular triggers to these events.  He had an episode of atrial fibrillation in the fall last year which spontaneously converted.  He reports no exertional chest pain or pressure.  He is not describing any orthopnea, PND or ankle edema.  The patient has significant left hip DJD and is scheduled to undergo left hip replacement tomorrow.    Current Outpatient Prescriptions   Medication Sig Dispense Refill     acetaminophen (TYLENOL) 325 MG tablet Take 650 mg by mouth every 6 (six) hours as needed for pain.       albuterol (PROAIR HFA;PROVENTIL HFA;VENTOLIN HFA) 90 mcg/actuation inhaler Inhale 2 puffs every 4 (four) hours as needed for wheezing. 1 Inhaler 0     aspirin 81 MG EC tablet Take 1 tablet (81 mg total) by mouth daily.  0     clotrimazole-betamethasone (LOTRISONE) lotion Apply to rash twice daily as needed 60 mL prn     diclofenac sodium (VOLTAREN) 1 % Gel APPLY 2-4 GRAMS TO AFFECTED AREAS INDICATED IN DIRECTIONS 100 g 1     ELIQUIS 5 mg Tab tablet TAKE 1 TABLET (5 MG TOTAL) BY MOUTH 2 (TWO) TIMES A DAY. 180 tablet 3     ketoconazole (NIZORAL) 2 % cream Apply 1 application topically 2 (two) times a day as needed. 15 g 0     lidocaine (LIDODERM) 5 % Remove & Discard patch within 12 hours or as directed by MD 30 patch prn     losartan (COZAAR) 100 MG tablet TAKE 1 TABLET BY MOUTH DAILY 90 tablet 1     omeprazole (PRILOSEC) 20 MG capsule Take 20 mg by mouth daily with supper.        polyvinyl alcohol (LIQUIFILM TEARS) 1.4 % ophthalmic solution Administer 1 drop to both eyes as needed for dry eyes.       sotalol (BETAPACE) 80 MG tablet TAKE 1 TABLET (80 MG TOTAL) BY MOUTH 2 (TWO) TIMES A DAY. 180 tablet 1     No current facility-administered medications for this visit.        Review of Systems:   General: WNL  Eyes: WNL  Ears/Nose/Throat: WNL  Lungs: WNL  Heart: WNL  Stomach: WNL  Bladder:  "WNL  Muscle/Joints: WNL  Skin: WNL  Nervous System: WNL  Mental Health: WNL     Blood: WNL    Family History  Family History   Problem Relation Age of Onset     Stroke Mother       age 68     Depression Father       of suicide age 73     Atrial fibrillation Brother        Social History   reports that he quit smoking about 35 years ago. He has a 30.00 pack-year smoking history. He has never used smokeless tobacco. He reports that he drinks about 0.6 oz of alcohol per week  He reports that he does not use illicit drugs.    Objective:   Vital signs in last 24 hours:  /72 (Patient Site: Right Arm, Patient Position: Sitting, Cuff Size: Adult Regular)  Pulse 62  Resp 18  Ht 5' 9\" (1.753 m)  Wt 203 lb (92.1 kg) Comment: with shoes  BMI 29.98 kg/m2  Weight: Weight: 203 lb (92.1 kg) (with shoes)     Physical Exam:  General: The patient is alert oriented to person place and situation.  The patient is in no acute distress at the time of my evaluation.  Eyes: Pupils are equal, round, and reactive to light.  Conjunctiva and sclera are clear.  ENT: Oral mucosa is moist and without redness. No evident nasal discharge.  Pulmonary: Lungs are clear bilaterally with no rales, rhonchi, or wheezes.    Cardiovascular exam: Rhythm is regular. S1 and S2 are normal. No significant murmur is present. JVP is normal. Lower extremities demonstrate no significant edema. Distal pulses are intact bilaterally.  Abdomen is flat, soft, and nontender.  Musculoskeletal: Spine is straight. Extremities without deformity.  Neuro: Gait is normal.   Skin is warm, dry, and otherwise intact.      Cardiographics:   Review of the patient's EKG from 2018 demonstrates coarse atrial fibrillation with a controlled ventricular response.    Lab Results:   Lab Results   Component Value Date     (L) 2018    K 4.8 2018    CL 99 2018    CO2 26 2018    BUN 16 2018    CREATININE 0.77 2018    CALCIUM 9.9 " 05/01/2018     Lab Results   Component Value Date    WBC 7.7 10/04/2017    WBC 5.4 10/15/2014    HGB 13.1 (L) 05/01/2018    HCT 39.8 (L) 10/04/2017    MCV 93 10/04/2017     10/04/2017     Lab Results   Component Value Date    INR 1.43 (H) 10/13/2016         Outside record review:

## 2021-06-18 NOTE — PROGRESS NOTES
[facility] Follow Up Note    Jaylan Alberto   78 y.o. male    Date of Visit: 6/8/2018    Chief Complaint   Patient presents with     Follow-up     DOD 6/1/18     Ruben Tian comes in today accompanied by his wife.  He was discharged 1 week ago after a hip replacement done at [facility] by Dr. Yosef Mason.  He feels as though he is doing well.  He is still taking about 4-6 oxycodone a day and has about 7 pills left.  He is having a lot of discomfort in his left knee and it is unclear whether or not this is due to the primary arthritis present there or due to the hip surgery.  He was anemic and has been slightly lightheaded and his blood pressures been a bit low and they have been holding his losartan.  His hemoglobin was 9.6 on the day of discharge.  He has been a bit constipated and has just been taking docusate.  Is back on his Eliquis for atrial fibrillation/flutter.    ROS A comprehensive review of systems was performed and was otherwise negative.    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  He quit smoking cigarettes in 1982.  He does drink some alcohol, but not excessively.  He tries to walk several times a week.  He has 3 children (one son is biological and his other son and daughter are adopted) and 2 grandchildren.       Medications were reconciled.  Allergies, social and family history, and the problem list were all reviewed and updated.    Old records reviewed: Records from [facility]    Exam  General Appearance: Pleasant and alert   Vitals:    06/08/18 1219   BP: 118/52   Patient Site: Left Arm   Patient Position: Sitting   Cuff Size: Adult Regular   Pulse: 64   Temp: 97.7  F (36.5  C)   TempSrc: Tympanic   SpO2: 97%   Weight: 204 lb (92.5 kg)      Body mass index is 30.13 kg/(m^2).  Wt Readings  from Last 3 Encounters:   06/08/18 204 lb (92.5 kg)   05/30/18 194 lb (88 kg)   05/29/18 203 lb (92.1 kg)     HEENT: Sclera are clear.   Lungs: Normal respirations  Abdomen: Soft and nondistended  Extremities: No edema  Skin: No rashes  Neuro: Moves all extremities and has facial symmetry  Gait: Ambulates with a normal gait    Assessment/Plan  1. Primary osteoarthritis of left hip  He feels he could get by with 30 more tablets of oxycodone so this was sent.  Did tell him to mention to Dr. Mason next week and perhaps he can get a knee injection to help with his recovery.  - oxyCODONE (ROXICODONE) 5 MG immediate release tablet; Take 1-2 tablets (5-10mg) by mouth every 4-6 hours as needed for pain.  Dispense: 30 tablet; Refill: 0    2. Anemia, unspecified type  Labs today.  - HM2(CBC w/o Differential)    3. Constipation  Just taking docusate, recommended adding senna to this.    4. Carpal tunnel syndrome of left wrist  Has known ill tunnel and had of the right hand and is status post a release.  We will do an EMG to prove it on the left as well and proceed accordingly.  - EMG- Left Arm; Future          April D MD Wil[facility]  Internal and Geriatric Medicine  [facility]    Much or all of the text in this note was generated through the use of Dragon Dictate voice-to-text software. Errors in spelling or words which seem out of context are unintentional. Sound alike errors, in particular, may have escaped editing.

## 2021-06-18 NOTE — ANESTHESIA PREPROCEDURE EVALUATION
Anesthesia Evaluation      Patient summary reviewed     Airway   Mallampati: I  Neck ROM: full   Pulmonary - negative ROS and normal exam                          Cardiovascular - normal exam  (+) hypertension well controlled, dysrhythmias, CHF, ,      Neuro/Psych - negative ROS     Endo/Other - negative ROS      GI/Hepatic/Renal    (+) GERD well controlled,             Dental - normal exam                        Anesthesia Plan  Planned anesthetic: spinal    ASA 3     Anesthetic plan and risks discussed with: patient

## 2021-06-18 NOTE — ANESTHESIA CARE TRANSFER NOTE
Last vitals:   Vitals:    05/30/18 1152   BP: 96/53   Pulse: (!) 51   Resp: 10   Temp: 37.2  C (99  F)   SpO2: 100%     Patient's level of consciousness is drowsy  Spontaneous respirations: yes  Maintains airway independently: yes  Dentition unchanged: yes  Oropharynx: oropharynx clear of all foreign objects    QCDR Measures:  ASA# 20 - Surgical Safety Checklist: WHO surgical safety checklist completed prior to induction  PQRS# 430 - Adult PONV Prevention: 4558F - Pt received => 2 anti-emetic agents (different classes) preop & intraop  ASA# 8 - Peds PONV Prevention: NA - Not pediatric patient, not GA or 2 or more risk factors NOT present  PQRS# 424 - Aarti-op Temp Management: 4559F - At least one body temp DOCUMENTED => 35.5C or 95.9F within required timeframe  PQRS# 426 - PACU Transfer Protocol: - Transfer of care checklist used  ASA# 14 - Acute Post-op Pain: ASA14B - Patient did NOT experience pain >= 7 out of 10

## 2021-06-18 NOTE — ANESTHESIA POSTPROCEDURE EVALUATION
Patient: Jaylan Alberto   LEFT TOTAL HIP ARTHROPLASTY  Anesthesia type: spinal    Patient location: PACU  Last vitals:   Vitals:    05/30/18 1243   BP: 116/64   Pulse: (!) 57   Resp: 16   Temp: 36.4  C (97.5  F)   SpO2: 96%     Post vital signs: stable  Level of consciousness: awake and responds to simple questions  Post-anesthesia pain: pain controlled  Post-anesthesia nausea and vomiting: no  Pulmonary: unassisted, return to baseline  Cardiovascular: stable and blood pressure at baseline  Hydration: adequate  Anesthetic events: no    QCDR Measures:  ASA# 11 - Aarti-op Cardiac Arrest: ASA11B - Patient did NOT experience unanticipated cardiac arrest  ASA# 12 - Aarti-op Mortality Rate: ASA12B - Patient did NOT die  ASA# 13 - PACU Re-Intubation Rate: NA - No ETT / LMA used for case   ASA# 10 - Composite Anes Safety: ASA10A - No serious adverse event    Additional Notes:

## 2021-06-19 NOTE — PROGRESS NOTES
Patient in for EKG after increasing sotalol to 120mg two times a day on Saturday.  He reports feeling well, but he feels his HR is slow and its especially noticeable when exercising. He feels he is a little more tired.  He denies any dizziness or lightheadedness.   QT confirms sinus bradycardia 52 bpm with QT//465.    Patient would like to go back to 80mg two times a day and call if he notices an increase of atrial fib.    Note forwarded to Gato Araujo and her nurse for further recommendations.

## 2021-06-19 NOTE — PROGRESS NOTES
Patient presents at the request of Dr. Aguilera for a left upper extremity EMG.  He has left hand numbness and tingling digits 1-3 for the past 2 months could be longer however.  This is constant.  He is dropping objects with his left hand.  He has history of right carpal tunnel release.  He is left-handed    EMG/NCS  results: Please see scanned full report.    Comment NCS: Abnormal study:    1.  Absent left median SNAP, CMAP and transcarpal study.  2.  Normal left ulnar sensory, transcarpal and motor studies and radial sensory study.    Comment EMG: Abnormal study.  1.  Increased insertional activity left APB with no motor unit potential activation.  Remainder left upper extremity needle EMG normal.    Interpretation: Abnormal study:    1.  Left median neuropathy at the wrist consistent with entrapment in the carpal tunnel, severe with no motor unit potential activation.    2. There is no electrodiagnostic evidence of cervical radiculopathy, brachial plexopathy, or ulnar neuropathy in the left upper extremity.      Note: Given the severity of the findings on today's exam, recommend surgical evaluation for potential decompression.    The testing was completed in its entirety by the physician.      It was our pleasure caring for your patient today, if there any questions or concerns please do not hesitate to contact us.

## 2021-06-19 NOTE — PROGRESS NOTES
1940  Home:[phone] (home) Cell:[phone] (mobile)  Emergency Contact: Delfina Nye [phone]    +++Important patient information for CSC/Cath Lab staff : PT IS ON SOTALOL+++    [facility] EP Cath Lab Procedure Order     Cardioversion:    Cardioversion     PT IS ON ELIQUIS AND NO MISSED DOSES REVIEWED BY GATO ARAUJO CNP    Diagnosis:  AF  Anticipated Case Duration:  Standard  Scheduling Needs/Timeframe:  PT IS SET FOR MONDAY 8/20/2018 AT 12 WITH LIBERTAD KEYS CNP    Current Device: None None  Device Company/Device Rep Needed for Procedure: None    Anesthesia:  General-CV Only  Research Protocol:  No    [facility] EP Cath Lab Prep   Ordering Provider: Gato Araujo NP  Ordering Date: 8/15/2018  Orders Status: Intial order placed and Order set placed  EP NC Contact: Natalie Reza LPN    H&P:  Compled by GATO ARAUJO CNP on 8/15/2018 if scheduled within 30 days, pt to schedule with PMD if procedure outside of this timeframe  PCP: Rossy Aguilera MD, [phone]    Pre-op Labs: N/A for procedure    Medical Records Pertinent for Procedure:  N/A    Patient Education:    PT HAS A  FOR PROCEDURE AND WILL BE WITH PT PRESENT FOR DISCHARGE AND 24 HR MONITORING POST CV  PT INSTRUCTED TO HOLD ANY VITAMINS, MINERALS CALCIUM IRON OR SUPPLEMENTS THE MORNING OF CV  PT IS ON ELIQUIS AND NO MISSED DOSES  PT IS ON SOTALOL INSTRUCTED TO INCREASE  MG Q 12 HRS ON Saturday 8/18/2018 PER GATO ARAUJO CNP  PT INSTRUCTED TO BATHE OR SHOWER BEFORE COMING IN  PT INSTRUCTED TO LEAVE JEWELRY AT HOME  PT IS TO HAVE A FOLLOW UP WITH GATO IN 3-4 WEEKS ND  HAS BEEN NOTIFIED  ALL INSTRUCTIONS REVIEWED WITH PT'S WIFE KAY.    Teach with Patient: Completed via phone on 8/16/2018 WITH WIFE KAY    Risks Reviewed:     Cardioversion    >90% acute success rate, <10% failure to convert or   reverts shortly after cardioversion.    <1% embolic event of (CVA, pulmonary embolism, or   other site).    75% risk for superficial  burn.  Risks associated with general anesthesia will be addressed by the Anesthesiology Department    Consent: Will be obtained in CSC day of procedure    Pre-Procedure Instructions that were Reviewed with Patient:  NPO after midnight, Remove all jewelry prior to coming in for procedure, Shower prior to arrival, Transportation arrangements needed s/p procedure, Post-procedure follow up process and Sedation plan/orders    Pre-Procedure Medication Instructions:  Instructions given to pt regarding anticoagulants: Eliquis- instructed to continue anticoagulation uninterrupted through their procedure  Instructions given to pt regarding antiarrhythmic medication: Sotalol; Pt instructed to continue medication prior to procedure  Instructions for medication, other than anticoagulants/antiarrhythmics listed above, given to pt: to take all morning medications with small sips of water, with the exception of OTC supplements and MVI    Allergies   Allergen Reactions     Chocolate Flavor Wheezing     No Known Drug Allergies        Current Outpatient Prescriptions:      acetaminophen (TYLENOL) 500 MG tablet, Take 2 tablets (1,000 mg total) by mouth 3 (three) times a day., Disp: , Rfl: 0     aspirin 81 MG EC tablet, Take 1 tablet (81 mg total) by mouth daily., Disp: , Rfl: 0     cetirizine (ZYRTEC) 10 MG tablet, Take 10 mg by mouth daily., Disp: , Rfl:      clotrimazole-betamethasone (LOTRISONE) lotion, Apply to rash twice daily as needed, Disp: 60 mL, Rfl: prn     diclofenac sodium (VOLTAREN) 1 % Gel, APPLY 2-4 GRAMS TO AFFECTED AREAS INDICATED IN DIRECTIONS, Disp: 100 g, Rfl: 1     ELIQUIS 5 mg Tab tablet, TAKE 1 TABLET (5 MG TOTAL) BY MOUTH 2 (TWO) TIMES A DAY., Disp: 180 tablet, Rfl: 3     ferrous sulfate 325 (65 FE) MG tablet, Take 1 tablet (325 mg total) by mouth daily with breakfast., Disp: 60 tablet, Rfl: 3     ketoconazole (NIZORAL) 2 % cream, Apply 1 application topically 2 (two) times a day as needed., Disp: 15 g, Rfl:  0     lidocaine (LIDODERM) 5 %, Remove & Discard patch within 12 hours or as directed by MD, Disp: 30 patch, Rfl: prn     losartan (COZAAR) 100 MG tablet, Take 1 tablet (100 mg total) by mouth daily., Disp: 90 tablet, Rfl: 3     omeprazole (PRILOSEC) 20 MG capsule, Take 20 mg by mouth daily with supper. , Disp: , Rfl:      polyvinyl alcohol (LIQUIFILM TEARS) 1.4 % ophthalmic solution, Administer 1 drop to both eyes as needed for dry eyes., Disp: , Rfl:      sotalol (BETAPACE) 80 MG tablet, Take 1.5 tablets (120 mg total) by mouth every 12 (twelve) hours. Start 2 days before cardioversion, Disp: 180 tablet, Rfl: 1    Documentation Date:8/16/2018 9:06 AM  Jeannie Reza LPN

## 2021-06-20 NOTE — PROGRESS NOTES
Assessment and Plan:   He thinks he is going to follow-up here with Dr. Selwyn Mccoy or downtown with Dr. Vasyl Navarro after my departure.  His hip replacement done in May went well.    1. Flu vaccine need  - Influenza High Dose, Seasonal    2.  Paroxysmal atrial fibrillation (H)  On sotalol, will take an extra dose when he goes into atrial fibrillation and he knows to contact cardiology if he remains in it for cardioversion.  Continue Eliquis.  Can stop the baby aspirin while on this.    3. Hyperlipidemia, unspecified hyperlipidemia type  Not currently on a statin.  No known history of coronary artery disease.    4. Essential hypertension with goal blood pressure less than 140/90  - Basic Metabolic Panel  - HM2(CBC w/o Differential)  - Thyroid Stimulating Hormone (TSH)  - Hepatic Profile  - Lipid Cascade  - Urinalysis-UC if Indicated    5. Vitamin D deficiency  - Vitamin D, Total (25-Hydroxy)    6. Prostate cancer (H)  Incidentally found during a TURP.  - PSA (Prostatic-Specific Antigen), Annual Screen    7. Knee instability, left  Likely tore his ACL at around age 48, would like to do some therapy, not yet ready for a knee replacement according to Dr. Mason.  - Ambulatory referral to PT/OT     The patient's current medical problems were reviewed.    The following health maintenance schedule was reviewed with the patient and provided in printed form in the after visit summary:   Health Maintenance   Topic Date Due     ASTHMA CONTROL TEST  06/08/2019     ASTHMA FOLLOW-UP  06/08/2019     FALL RISK ASSESSMENT  10/08/2019     ADVANCE DIRECTIVES DISCUSSED WITH PATIENT  10/16/2020     TD 18+ HE  06/29/2021     PNEUMOCOCCAL POLYSACCHARIDE VACCINE AGE 65 AND OVER  Completed     INFLUENZA VACCINE RULE BASED  Completed     PNEUMOCOCCAL CONJUGATE VACCINE FOR ADULTS (PCV13 OR PREVNAR)  Completed     ZOSTER VACCINE  Completed        Subjective:   Chief Complaint: Jaylan Alberto is an 78 y.o. male here for an Annual Wellness  visit.   HPI: He is feeling and doing well but having some left knee pain.  He has had this evaluated by Ortho and is not needing a knee replacement but he like to pursue some physical therapy.  He had one episode of atrial fibrillation not too long ago while out of town and it resolved with an extra dose of sotalol.  He had a slight cough this morning but denies any recent illness.    Review of Systems:    Please see above.  The rest of the review of systems are negative for all systems.    Patient Care Team:  Rossy Aguilera MD as PCP - General  Orlin Dailey MD as Physician (Cardiology)  Girma Christy MD (Dermatology)  Cleve Lopez MD as Physician (Urology)     Patient Active Problem List   Diagnosis     Basal Cell Carcinoma Of The Skin Of The Face     Prostate Cancer     Hyperlipidemia     Essential hypertension with goal blood pressure less than 140/90     Male Erectile Disorder     Esophageal Reflux     Osteoarthritis     History of snoring     Persistent atrial fibrillation (H)     Status post catheter ablation of atrial fibrillation     Atypical atrial flutter (H)     Hip osteoarthritis     Past Medical History:   Diagnosis Date     Arthritis     Generalized     Atrial fibrillation (H)      Cancer (H)     Prostate and skin     CHF (congestive heart failure) (H)     PMH     GERD (gastroesophageal reflux disease)      Hyperlipidemia      Hypertension      Status post catheter ablation of atrial fibrillation 5/26/2017    PVI May 26, 2017 (cryo-PVI + CFE + VIKKI line + CTI line)      Past Surgical History:   Procedure Laterality Date     CARDIAC CATHETERIZATION  Nov 2014     CARDIAC ELECTROPHYSIOLOGY MAPPING AND ABLATION  5/26/17    PVI     CARDIOVERSION      8/17/16, 10/10/16, 3/22/17     CARDIOVERSION  08/18/2017     CARDIOVERSION  04/04/2018     CATARACT EXTRACTION       ID CORRJ HALLUX VALGUS W/SESMDC W/RESCJ PROX PHAL      Description: Bunion Correction By Cheilectomy;  Recorded: 06/29/2011;      OK EPHYS EVAL W/ABLATION SUPRAVENT ARRHYTHMIA  2017    Procedure: EP Ablation Atrial Flutter;  Surgeon: Orlin Dailey MD;  Location: [facility] Lab;  Service: Cardiology     OK EPHYS EVL TRNSPTL TX ATRIAL FIB ISOLAT PULM VEIN Left 2017    Procedure: EP Ablation PVI;  Surgeon: Orlin Dailey MD;  Location: [facility] Cath Lab;  Service: Cardiology     OK EXCIS TENDON SHEATH LESION, HAND/FINGER      Description: Hand Excision Of A Tendon Cyst;  Recorded: 2011;  Comments: thumb     OK EXCISION MULTIPLE EXTERNAL PAPILLAE/TAGS ANUS      Description: Hemorrhoidectomy Excision Of External Tags;  Recorded: 2011;     OK REMOVAL OF TONSILS,<11 Y/O      Description: Tonsillectomy;  Recorded: 2011;     OK REPAIR OF HAMMERTOE,ONE      Description: Arthroplasty For Hammertoe;  Recorded: 2011;     OK TOTAL HIP ARTHROPLASTY Left 2018    Procedure:  LEFT TOTAL HIP ARTHROPLASTY;  Surgeon: Yosef Mason MD;  Location: [facility];  Service: Orthopedics     OK TRANSURETHRAL ELEC-SURG PROSTATECTOM      Description: Transurethral Resection Of Prostate (TURP);  Recorded: 2011;  Comments: Dr. Lopez  with small foci of Pr CA present      Family History   Problem Relation Age of Onset     Stroke Mother       age 68     Depression Father       of suicide age 73     Atrial fibrillation Brother       Social History     Social History     Marital status:      Spouse name: N/A     Number of children: N/A     Years of education: N/A     Occupational History     retired      Social History Main Topics     Smoking status: Former Smoker     Packs/day: 1.00     Years: 30.00     Quit date: 1983     Smokeless tobacco: Never Used     Alcohol use 0.6 oz/week     1 Cans of beer per week      Comment: daily     Drug use: No     Sexual activity: Not on file     Other Topics Concern     Not on file     Social History Narrative    He is in 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 Dominique Nye, a nephrologist.  He quit smoking cigarettes in 1982.  He does drink some alcohol, but not excessively.  He tries to walk several times a week.  He has 3 children (one son is biological and his other son and daughter are adopted) and 2 grandchildren.      Current Outpatient Prescriptions   Medication Sig Dispense Refill     acetaminophen (TYLENOL) 500 MG tablet Take 2 tablets (1,000 mg total) by mouth 3 (three) times a day.  0     cetirizine (ZYRTEC) 10 MG tablet Take 10 mg by mouth daily.       clotrimazole-betamethasone (LOTRISONE) lotion Apply to rash twice daily as needed 60 mL prn     diclofenac sodium (VOLTAREN) 1 % Gel APPLY 2-4 GRAMS TO AFFECTED AREAS INDICATED IN DIRECTIONS 100 g 1     ELIQUIS 5 mg Tab tablet TAKE 1 TABLET (5 MG TOTAL) BY MOUTH 2 (TWO) TIMES A DAY. 180 tablet 3     ketoconazole (NIZORAL) 2 % cream Apply 1 application topically 2 (two) times a day as needed. 15 g 0     lidocaine (LIDODERM) 5 % Remove & Discard patch within 12 hours or as directed by MD 30 patch prn     losartan (COZAAR) 100 MG tablet Take 1 tablet (100 mg total) by mouth daily. 90 tablet 3     omeprazole (PRILOSEC) 20 MG capsule Take 20 mg by mouth daily with supper.        polyvinyl alcohol (LIQUIFILM TEARS) 1.4 % ophthalmic solution Administer 1 drop to both eyes as needed for dry eyes.       sotalol (BETAPACE) 80 MG tablet Take 1 tablet (80 mg total) by mouth every 12 (twelve) hours. 180 tablet 1     No current facility-administered medications for this visit.       Objective:   Vital Signs:   Visit Vitals     /64 (Patient Site: Left Arm, Patient Position: Sitting, Cuff Size: Adult Regular)     Pulse (!) 54     Ht 5' 8.5\" (1.74 m)     Wt 199 lb 3 oz (90.4 kg)     SpO2 98%     BMI 29.85 kg/m2        VisionScreening:  No exam data present     PHYSICAL EXAM  Wt Readings from Last 3 Encounters:   10/08/18 " 199 lb 3 oz (90.4 kg)   08/20/18 202 lb (91.6 kg)   08/15/18 201 lb (91.2 kg)     General Appearance: Pleasant and alert  HEENT: Sclera are clear, tympanic membranes clear  Lungs: Normal respirations, clear to auscultation, after a cough  Cardiac: Regular rate and rhythm with no appreciable murmur rub or gallop   Abdomen: Soft and nondistended  Extremities: No edema  Skin: No rashes  Neuro: Moves all extremities and has facial symmetry  Gait: Ambulates with a normal gait    Personalized prevention plan: Lifestyle interventions to promote self-management and wellness were discussed including good nutrition, ongoing physical activity, falls prevention and continuing with screening tests as recommended including new shingles vaccine and those listed in the health maintenance plan listed above.    Much or all of the text in this note was generated through the use of Dragon Dictate voice-to-text software. Errors in spelling or words which seem out of context are unintentional. Sound alike errors, in particular, may have escaped editing.      Assessment Results 10/8/2018   Activities of Daily Living No help needed   Instrumental Activities of Daily Living No help needed   Get Up and Go Score -   Mini Cog Total Score 5   Some recent data might be hidden     A Mini-Cog score of 0-2 suggests the possibility of dementia, score of 3-5 suggests no dementia    Identified Health Risks:     The patient was provided with written information regarding signs of hearing loss.  Patient's advanced directive was discussed and I am comfortable with the patient's wishes.

## 2021-06-20 NOTE — PROGRESS NOTES
Subjective findings: The patient returned to the clinic today complaining   of long thick painful nails on both feet.     OBJECTIVE FINDINGS: Nails bilaterally are long, thick, discolored, and dystrophic 1 through 5   both feet. The patient has an incurvated medial border, both great toenails.   Skin bilaterally warm, supple, and intact. No skin lesions are noted. DP   and PT pulses plus 2/4 bilaterally. Capillary refill less than 2 seconds   bilaterally. Negative clonus. Negative Babinski bilaterally. Range of   motion within normal limits bilaterally. Muscle power plus 5/5 bilaterally   within all compartments.     ASSESSMENT:   1. Onychomycosis.   2. Onychauxis.     PLAN: I debrided nails 1 through 5 both feet today.

## 2021-06-21 NOTE — PROGRESS NOTES
Office Visit - New Patient   Jaylan Alberto   78 y.o.  male    Date of visit: 11/1/2018  Physician: Vincent Navarro MD     Assessment and Plan   1. Paroxysmal atrial fibrillation (H)  2. Atypical atrial flutter (H)  Status post ablation, on a rhythm control strategy with Eliquis for stroke prevention    3. Essential hypertension with goal blood pressure less than 140/90  Blood pressure is controlled he is on losartan    4. Gastroesophageal reflux disease without esophagitis  Continue omeprazole    5. History of snoring  2 sleep studies no apnea    6. Prostate Cancer  Prostate actually removed for symptoms of BPH.  Small focus of answer found, follows with Dr. Lopez.  He is been having some urinary frequency and incontinence and has an appointment in early August with Dr. Lopez    7. Osteoarthritis  Doing okay continue with acetaminophen and topical Voltaren gel    Return in about 1 year (around 11/1/2019) for annual physical.     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 Dominique Nye, a nephrologist.  He quit smoking cigarettes in 1982.  He does drink some alcohol, but not excessively.  He tries to walk several times a week.  He has 3 children (one son is biological (Paramjit) and his other son (Jeremi) and daughter (Tatiana Benson) are adopted) and 2 grandchildren.        Past Medical History   Patient Active Problem List   Diagnosis     Basal Cell Carcinoma Of The Skin Of The Face     Prostate Cancer     Hyperlipidemia     Essential hypertension with goal blood pressure less than 140/90     Male Erectile Disorder     Esophageal Reflux     Osteoarthritis     History of snoring     Paroxysmal atrial fibrillation (H)     Status post catheter ablation of atrial fibrillation      Atypical atrial flutter (H)     Hip osteoarthritis       Past Surgical History  He has a past surgical history that includes pr repair of hammertoe,one; pr removal of tonsils,<11 y/o; pr excision multiple external papillae/tags anus; pr corrj hallux valgus w/sesmdc w/rescj prox phal; pr excis tendon sheath lesion, hand/finger; pr transurethral elec-surg prostatectom; Cardiac catheterization (Nov 2014); Cardioversion; Cardiac electrophysiology mapping and ablation (5/26/17); Cataract extraction; pr ephys evl trnsptl tx atrial fib isolat pulm vein (Left, 5/26/2017); pr ephys eval w/ablation supravent arrhythmia (5/26/2017); Cardioversion (08/18/2017); Cardioversion (04/04/2018); and pr total hip arthroplasty (Left, 5/30/2018).     History of Present Illness   This 78 y.o. old man comes in to [facility].  He is previously a patient of Dr. Aguilera.  He has no concerns today with some exception of urinary frequency and incontinence for which she is going to see urology.  We reviewed his history and chart updated.    Review of Systems: A comprehensive review of systems was negative except as noted.     Medications and Allergies   Current Outpatient Prescriptions   Medication Sig Dispense Refill     cetirizine (ZYRTEC) 10 MG tablet Take 10 mg by mouth daily.       clotrimazole-betamethasone (LOTRISONE) lotion Apply to rash twice daily as needed 60 mL prn     diclofenac sodium (VOLTAREN) 1 % Gel APPLY 2-4 GRAMS TO AFFECTED AREAS INDICATED IN DIRECTIONS 100 g 1     ELIQUIS 5 mg Tab tablet TAKE 1 TABLET (5 MG TOTAL) BY MOUTH 2 (TWO) TIMES A DAY. 180 tablet 3     ketoconazole (NIZORAL) 2 % cream Apply 1 application topically 2 (two) times a day as needed. 15 g 0     losartan (COZAAR) 100 MG tablet Take 1 tablet (100 mg total) by mouth daily. 90 tablet 3     omeprazole (PRILOSEC) 20 MG capsule Take 20 mg by mouth daily with supper.        polyvinyl alcohol (LIQUIFILM TEARS) 1.4 % ophthalmic solution Administer 1 drop  "to both eyes as needed for dry eyes.       sotalol (BETAPACE) 80 MG tablet Take 1 tablet (80 mg total) by mouth every 12 (twelve) hours. 180 tablet 1     No current facility-administered medications for this visit.      Allergies   Allergen Reactions     Chocolate Flavor Wheezing     No Known Drug Allergies         Family and Social History   Family History   Problem Relation Age of Onset     Stroke Mother       age 68     Depression Father       of suicide age 73     Atrial fibrillation Brother      No Medical Problems Son      No Medical Problems Son      Adopted     Mental illness Daughter      Adopted        Social History   Substance Use Topics     Smoking status: Former Smoker     Packs/day: 1.00     Years: 30.00     Quit date: 1983     Smokeless tobacco: Never Used     Alcohol use 0.6 oz/week     1 Cans of beer per week      Comment: daily        Physical Exam   General Appearance:   No acute distress    /58 (Patient Site: Right Arm, Patient Position: Sitting, Cuff Size: Adult Regular)  Pulse 69  Ht 5' 8.5\" (1.74 m)  Wt 202 lb (91.6 kg)  SpO2 98%  BMI 30.27 kg/m2    EYES: Eyelids, conjunctiva, and sclera were normal. Pupils were normal. Cornea, iris, and lens were normal bilaterally.  HEAD, EARS, NOSE, MOUTH, AND THROAT: Head and face were normal. Hearing was normal to voice and the ears were normal to external exam. Nose appearance was normal and there was no discharge. Oropharynx was normal.  NECK: Neck appearance was normal. There were no neck masses and the thyroid was not enlarged.  RESPIRATORY: Breathing pattern was normal and the chest moved symmetrically.  Percussion/auscultatory percussion was normal.  Lung sounds were normal and there were no abnormal sounds.  CARDIOVASCULAR: Heart rate and rhythm were normal.  S1 and S2 were normal and there were no extra sounds or murmurs. Peripheral pulses in arms and legs were normal.  Jugular venous pressure was normal.  There was no " peripheral edema.  GASTROINTESTINAL: The abdomen was normal in contour.  Bowel sounds were present.  Percussion detected no organ enlargement or tenderness.  Palpation detected no tenderness, mass, or enlarged organs.   MUSCULOSKELETAL: Skeletal configuration was normal and muscle mass was normal for age. Joint appearance was overall normal.  LYMPHATIC: There were no enlarged nodes.  SKIN/HAIR/NAILS: Skin color was normal.  There were no skin lesions.  Hair and nails were normal.  NEUROLOGIC: The patient was alert and oriented to person, place, time, and circumstance. Speech was normal. Cranial nerves were normal. Motor strength was normal for age. The patient was normally coordinated.  PSYCHIATRIC:  Mood and affect were normal and the patient had normal recent and remote memory. The patient's judgment and insight were normal.       Additional Information   Review and/or order of clinical lab tests: Reviewed  Review and/or order of radiology tests: Reviewed  Review and/or order of medicine tests: Reviewed  Discussion of test results with performing physician:  Decision to obtain old records and/or obtain history from someone other than the patient:  Review and summarization of old records and/or obtaining history from someone other than the patient and.or discussion of case with another health care provider: Reviewed numerous consultative notes and recent visits with Dr. Rossy Aguilera, the of which is reflected in the chart and in this note  Independent visualization of image, tracing or specimen itself:    Time: total time spent with the patient was 45 minutes of which >50% was spent in counseling and coordination of care     Vincent Navarro MD  Internal Medicine  Contact me at [phone]

## 2021-06-21 NOTE — PROGRESS NOTES
1940  Home:[phone] (home) Cell:[phone] (mobile)  Emergency Contact: Delfina Nye [phone]    +++Important patient information for [facility]/Cath Lab staff : PT IS ON ELIQUIS+++    [facility] EP Cath Lab Procedure Order     Cardioversion:    Cardioversion    PT IS ON ELIQUIS AND NO MISSED DOSES     Diagnosis:  AF  Anticipated Case Duration:  Standard  Scheduling Needs/Timeframe:  PT IS SET FOR FRIDAY 11/30/2018 AT 12 WITH LIBERTAD KEYS CNP    Current Device: None None  Device Company/Device Rep Needed for Procedure: None    Anesthesia:  General-CV Only  Research Protocol:  No    [facility] EP Cath Lab Prep   Ordering Provider: DR NUGENT  Ordering Date: 11/26/2018  Orders Status: Intial order placed and Order set placed  EP NC Contact: Natalie Reza LPN    H&P:  Compled by DR NUGENT on 11/26/2018 if scheduled within 30 days, pt to schedule with PMD if procedure outside of this timeframe  PCP: Vincent Navarro MD, [phone]    Pre-op Labs: N/A for procedure    Medical Records Pertinent for Procedure:  N/A    Patient Education:    PT HAS A  FOR PROCEDURE THAT WILL STAY WITH PT AND BE PRESENT  FOR DISCHARGE AND 24 HR POST CV MONITORING  PT INSTRUCTED TO HOLD ANY VITAMINS, MINERALS, CALCIUM, IRON OR SUPPLEMENTS THE MORNING OF CV  PT INSTRUCTED TO BATHE OR SHOWER BEFORE COMING IN  PT INSTRUCTED TO LEAVE JEWELRY AT HOME  PT IS ON SOTALOL  PT IS ON ELIQUIS NO MISSED DOSES  PT IS TO HAVE 1 MONTH FOLLOW UP WITH FABIEN AND  HAS BEEN NOTIFIED        Teach with Patient: Completed via phone on 11/27/2018    Risks Reviewed:     Cardioversion    >90% acute success rate, <10% failure to convert or   reverts shortly after cardioversion.    <1% embolic event of (CVA, pulmonary embolism, or   other site).    75% risk for superficial burn.  Risks associated with general anesthesia will be addressed by the Anesthesiology Department    Consent: Will be obtained in [facility] day of procedure    Pre-Procedure Instructions that  were Reviewed with Patient:  NPO after midnight, Remove all jewelry prior to coming in for procedure, Shower prior to arrival, Transportation arrangements needed s/p procedure, Post-procedure follow up process and Sedation plan/orders    Pre-Procedure Medication Instructions:  Instructions given to pt regarding anticoagulants: Eliquis- instructed to continue anticoagulation uninterrupted through their procedure  Instructions given to pt regarding antiarrhythmic medication: Sotalol; Pt instructed to continue medication prior to procedure  Instructions for medication, other than anticoagulants/antiarrhythmics listed above, given to pt: to take all morning medications with small sips of water, with the exception of OTC supplements and MVI    Allergies   Allergen Reactions     Chocolate Flavor Wheezing     No Known Drug Allergies        Current Outpatient Medications:      cetirizine (ZYRTEC) 10 MG tablet, Take 10 mg by mouth daily., Disp: , Rfl:      clotrimazole-betamethasone (LOTRISONE) lotion, Apply to rash twice daily as needed, Disp: 60 mL, Rfl: prn     diclofenac sodium (VOLTAREN) 1 % Gel, APPLY 2-4 GRAMS TO AFFECTED AREAS INDICATED IN DIRECTIONS, Disp: 100 g, Rfl: 1     ELIQUIS 5 mg Tab tablet, TAKE 1 TABLET BY MOUTH TWICE DAILY, Disp: 180 tablet, Rfl: 3     ketoconazole (NIZORAL) 2 % cream, Apply 1 application topically 2 (two) times a day as needed., Disp: 15 g, Rfl: 0     losartan (COZAAR) 100 MG tablet, Take 1 tablet (100 mg total) by mouth daily., Disp: 90 tablet, Rfl: 3     omeprazole (PRILOSEC) 20 MG capsule, Take 20 mg by mouth daily with supper. , Disp: , Rfl:      polyvinyl alcohol (LIQUIFILM TEARS) 1.4 % ophthalmic solution, Administer 1 drop to both eyes as needed for dry eyes., Disp: , Rfl:      sotalol (BETAPACE) 80 MG tablet, Take 1 tablet (80 mg total) by mouth every 12 (twelve) hours., Disp: 180 tablet, Rfl: 1    Documentation Date:11/27/2018 7:58 AM  Jeannie Reza LPN

## 2021-06-21 NOTE — PROGRESS NOTES
CARDIOLOGY CLINIC CONSULT NOTE     Assessment/Plan:   1. Paroxysmal atrial fibrillation, with recurrence likely related to upper respiratory infection.  On chronic Eliquis and compliant with regimen, discussed options of continued medical management or cardioversion.  He strongly wishes to pursue cardioversion.  We will schedule outpatient cardioversion for this week.  Continue sotalol 80 twice daily  2. Essential hypertension.  Well-controlled on his current medical regimen.    Follow up with Gato Araujo 1 month after cardioversion     History of Present Illness:     It is my pleasure to see Jaylan Alberto at the [facility] clinic for evaluation of paroxysmal atrial fibrillation.    Jaylan Alberto is a 78 y.o. male with a past medical history of paroxysmal atrial fibrillation and atypical flutter for which he underwent pulmonary vein isolation procedure in 5/2017.  Prior to that he had failed a number of antiarrhythmic regimens, including flecainide, dofetilide, amiodarone.  With recurrence after ablation, he was initiated on sotalol 80 twice daily.  He was intolerant of 120 mg twice daily due to bradycardia.  He has continued Eliquis.  He is symptomatic with recurrent episodes, noting a decrease in his energy level.  These episodes of frequently been associated with upper respiratory infections.    2 weeks ago he began experiencing some flulike symptoms.  A couple of days later he noted the onset at 8 PM of recurrent atrial fibrillation.  He reports that his flu symptoms are subsiding, but he remains fatigued with activities.  He has not done his usual exercise regimen of a stationary bicycle or brisk walks over the last 10 days.  His alcohol intake has decreased to less than 1 daily.    Past Medical History:     Patient Active Problem List   Diagnosis     Basal Cell Carcinoma Of The Skin Of The Face     Prostate Cancer     Hyperlipidemia     Essential hypertension with goal blood pressure  less than 140/90     Male Erectile Disorder     Esophageal Reflux     Osteoarthritis     History of snoring     Paroxysmal atrial fibrillation (H)     Status post catheter ablation of atrial fibrillation     Atypical atrial flutter (H)     Hip osteoarthritis       Past Surgical History:     Past Surgical History:   Procedure Laterality Date     CARDIAC CATHETERIZATION  Nov 2014     CARDIAC ELECTROPHYSIOLOGY MAPPING AND ABLATION  5/26/17    PVI     CARDIOVERSION      8/17/16, 10/10/16, 3/22/17     CARDIOVERSION  08/18/2017     CARDIOVERSION  04/04/2018     CATARACT EXTRACTION       CA CORRJ HALLUX VALGUS W/SESMDC W/RESCJ PROX PHAL      Description: Bunion Correction By Cheilectomy;  Recorded: 06/29/2011;     CA EPHYS EVAL W/ABLATION SUPRAVENT ARRHYTHMIA  5/26/2017    Procedure: EP Ablation Atrial Flutter;  Surgeon: rOlin Dailey MD;  Location: [facility] Cath Lab;  Service: Cardiology     CA EPHYS EVL TRNSPTL TX ATRIAL FIB ISOLAT PULM VEIN Left 5/26/2017    Procedure: EP Ablation PVI;  Surgeon: Orlin Dailey MD;  Location: [facility] Cath Lab;  Service: Cardiology     CA EXCIS TENDON SHEATH LESION, HAND/FINGER      Description: Hand Excision Of A Tendon Cyst;  Recorded: 06/29/2011;  Comments: thumb     CA EXCISION MULTIPLE EXTERNAL PAPILLAE/TAGS ANUS      Description: Hemorrhoidectomy Excision Of External Tags;  Recorded: 06/16/2011;     CA REMOVAL OF TONSILS,<13 Y/O      Description: Tonsillectomy;  Recorded: 06/16/2011;     CA REPAIR OF HAMMERTOE,ONE      Description: Arthroplasty For Hammertoe;  Recorded: 06/16/2011;     CA TOTAL HIP ARTHROPLASTY Left 5/30/2018    Procedure:  LEFT TOTAL HIP ARTHROPLASTY;  Surgeon: Yosef Mason MD;  Location: [facility];  Service: Orthopedics     CA TRANSURETHRAL ELEC-SURG PROSTATECTOM      Description: Transurethral Resection Of Prostate (TURP);  Recorded: 06/29/2011;  Comments: Dr. Lopez 2007 with small foci of Pr CA present       Family History:     Family  History   Problem Relation Age of Onset     Stroke Mother          age 68     Depression Father          of suicide age 73     Atrial fibrillation Brother      No Medical Problems Son      No Medical Problems Son         Adopted     Mental illness Daughter         Adopted     Family history reviewed and is not pertinent to the chief complaint or presenting problem    Social History:    reports that he quit smoking about 35 years ago. He has a 30.00 pack-year smoking history. he has never used smokeless tobacco. He reports that he drinks about 0.6 oz of alcohol per week. He reports that he does not use drugs.  His wife is a retired nephrologist.    Exercise: Stationary bicycle 4-5 days a week, generally walks 20-30 minutes on a daily basis.    Sleep: Snores, but negative sleep studies x2    Meds:     Current Outpatient Medications on File Prior to Visit   Medication Sig Dispense Refill     cetirizine (ZYRTEC) 10 MG tablet Take 10 mg by mouth daily.       clotrimazole-betamethasone (LOTRISONE) lotion Apply to rash twice daily as needed 60 mL prn     diclofenac sodium (VOLTAREN) 1 % Gel APPLY 2-4 GRAMS TO AFFECTED AREAS INDICATED IN DIRECTIONS 100 g 1     ELIQUIS 5 mg Tab tablet TAKE 1 TABLET BY MOUTH TWICE DAILY 180 tablet 3     ketoconazole (NIZORAL) 2 % cream Apply 1 application topically 2 (two) times a day as needed. 15 g 0     losartan (COZAAR) 100 MG tablet Take 1 tablet (100 mg total) by mouth daily. 90 tablet 3     omeprazole (PRILOSEC) 20 MG capsule Take 20 mg by mouth daily with supper.        polyvinyl alcohol (LIQUIFILM TEARS) 1.4 % ophthalmic solution Administer 1 drop to both eyes as needed for dry eyes.       sotalol (BETAPACE) 80 MG tablet Take 1 tablet (80 mg total) by mouth every 12 (twelve) hours. 180 tablet 1     No current facility-administered medications on file prior to visit.        Allergies:   Chocolate flavor and No known drug allergies    Review of Systems:     General: WNL  Eyes:  "WNL  Ears/Nose/Throat: WNL  Lungs: WNL  Heart: Irregular Heartbeat  Stomach: WNL  Bladder: WNL  Muscle/Joints: WNL  Skin: WNL  Nervous System: WNL  Mental Health: WNL     Blood: WNL \"Eliquis blotches\" bruising        Objective:      Physical Exam  201 lb (91.2 kg)  5' 8.5\" (1.74 m)  Body mass index is 30.12 kg/m .  /60 (Patient Site: Left Arm, Patient Position: Sitting, Cuff Size: Adult Regular)   Pulse (!) 50   Resp 16   Ht 5' 8.5\" (1.74 m)   Wt 201 lb (91.2 kg)   BMI 30.12 kg/m      General Appearance : Awake, Alert, No acute distress  HEENT: No Scleral icterus; the mucous membranes were pink and moist.  Conjunctivae not injected  Neck:  No cervical bruits, jugular venous distention, or thyromegaly    Chest: The spine was straight  Lungs: Respirations unlabored; the lungs are clear to auscultation.  No wheezing    Cardiovascular:    Normal point of maximal impulse.  Auscultation reveals irregularly irregular first and second heart sounds with no murmurs, rubs, or gallops.  Carotid, radial, and dorsalis pedal pulses are intact and symmetric.  Abdomen: No organomegaly, masses, bruits, or tenderness. Bowels sounds are present  Extremities: No edema  Skin: No xanthelasma. Warm, Dry.  Musculoskeletal: No tenderness.  Neurologic: Alert and oriented ×3.      EKG(personally reviewed):  Atrial flutter with variable block at 68 bpm.    Cardiac Imaging Studies:  Coronary angiogram 12/2013: Normal coronaries    Lab Review   Lab Results   Component Value Date     (L) 10/08/2018    K 4.3 10/08/2018     10/08/2018    CO2 26 10/08/2018    BUN 16 10/08/2018    CREATININE 0.72 10/08/2018    CALCIUM 9.6 10/08/2018     Lab Results   Component Value Date    WBC 6.0 10/08/2018    WBC 5.4 10/15/2014    HGB 13.4 (L) 10/08/2018    HCT 39.0 (L) 10/08/2018    MCV 90 10/08/2018     10/08/2018     Lab Results   Component Value Date    CHOL 165 10/08/2018    TRIG 83 10/08/2018    HDL 31 (L) 10/08/2018    LDLCALC " 117 10/08/2018     Lab Results   Component Value Date    TROPONINI 2.60 (HH) 05/28/2017     Lab Results   Component Value Date     (H) 05/28/2017     Lab Results   Component Value Date    TSH 1.53 10/08/2018       Kt Solo MD [facility]    His note created using Dragon voice recognition software. Sound alike errors may have escaped editing.

## 2021-06-21 NOTE — PROGRESS NOTES
[facility]   Knee Initial Evaluation    Patient Name: Jaylan Alberto  Date of evaluation: 11/2/2018  Referral Diagnosis: Knee instability, left  Referring provider: Rossy Aguilera MD  Visit Diagnosis:     ICD-10-CM    1. Chronic pain of left knee M25.562     G89.29    2. Decreased ROM of left knee M25.662    3. Weakness of left lower extremity R29.898        Assessment:      Skilled PT is required to modulate pain/increase function/strength through exercise  Pt. is appropriate for skilled PT intervention as outlined in the Plan of Care (POC).  Pt. is a good candidate for skilled PT services to improve pain levels and function.    Goals:  Pt. will demonstrate/verbalize independence in self-management of condition in : 6 weeks  Pt. will be independent with home exercise program in : 6 weeks  Pt. will have improved quality of sleep: with less pain;in 6 weeks;Comment  Comment:: lessen medial /lateral knee pain when SL for sleep  Patient will ascend / descend: stairs;with railing;with recipricol gait;in 6 weeks;Comment  Comment:: routinely descend steps reciprocally with less pain  No Data Recorded    Patient's expectations/goals are realistic.    Barriers to Learning or Achieving Goals:  Chronicity of the problem.  Co-morbidities or other medical factors.  left MIKEL 5/30/2018       Plan / Patient Instructions:      Plan of Care:   Authorization / Certification Start Date: 11/02/18  Authorization / Certification End Date: 12/28/18  Authorization / Certification Number of Visits: 3  Communication with: Referral Source  Patient Related Instruction: Nature of Condition;Treatment plan and rationale;Self Care instruction;Basis of treatment;Precautions;Next steps;Expected outcome  Times per Week: 1x every 6 weeks  Number of Weeks: 12  Number of Visits: 2  Discharge Planning: Independent HEP and self-management of symptoms  Precautions / Restrictions : Left MIKEL 5/30/2018, atrial fibrillation, HTN, left knee OA,  "bilat shoulder restrictions  Therapeutic Exercise: Strengthening  Neuromuscular Reeducation: balance/proprioception  Equipment: theraband    Plan for next visit: See 1x in 6 weeks to review progress with HEP and add 2-3\" step up, mini squat progression.     Subjective:        Social information:   Occupation:Retired   Work Status:NA   Equipment Available: Has knee sleeve but hasn't used it.  Has SE cane after MIKEL 2018    History of Present Illness:    Jaylan is a 78 y.o. male who presents to therapy today with complaints of intermittent left knee pain. Date of onset/duration of symptoms is original injury 25-30 years ago when he fell into a hole.  Dr. Mason suspected a prior ACL injury.  Onset was related to trauma. Symptoms are intermittent. He reports  a constant  history of similar symptoms. He describes their previous level of function as limited with stair climbing predominantly.    Pain Ratin  Pain rating at best: 0  Pain rating at worst: NA  Pain description:pain, soreness and weakness    Functional limitations are described as occurring with:   sleep at SL with med/lat knee pain, stair descent, occasionally singularly    Patient reports benefit from:  riding bike       Objective:      Note: Items left blank indicates the item was not performed or not indicated at the time of the evaluation.    Patient Outcome Measures :    Lower Extremity Functional Scale (_/80): 62   Scores range from 0-80, where a score of 80 represents maximum function. The minimal clinically important difference is a positive change of 9 points.    Knee Examination  1. Chronic pain of left knee     2. Decreased ROM of left knee     3. Weakness of left lower extremity       Precautions/Restrictions:  MIKEL 2018  Involved Side: Left  Posture Observation:      Lower extremity:  Knee:  Left genu valgus.slight; bilat pes planus  Assistive Device: None  Gait Observation: antalgic/pelvic weakness  Lumbar Clearing: Does not provoke " symptoms  Hip Clearing: Does not provoke symptoms   Girth:  Suprapatellar right 43.0 cm left 44.7 cm;    Knee ROM Within normal limits unless otherwise indicated     Date:  11/2/2018      AROM in degrees  Right   Left  Right   Left  Right   Left       Knee Flexion  (130 )  120   120                   Knee Extension  (0 )   8   5                 PROM in degrees  Right   Left  Right   Left  Right   Left       Knee Flexion  (130 )   125   120                   Knee Extension  (0 )   0 0                 LE Strength    Within normal limits unless otherwise indicated               Date:  11/2/2018     Strength (MMT/5)  Right   Left  Right   Left  Right   Left       Hip Flexion                         Hip Abduction                         Hip Adduction                         Hip Extension                         Hip Internal Rotation                         Hip External Rotation                       Knee Extension 5   5                   Knee Flexion   5 5                   Ankle Dorsiflexion                         Ankle Plantarflexion                       Flexibility:  gastroc bilat min-mod loss, HS 75-80o    Palpation:  NA    Knee Special Tests (+/-):  NA    Knee OA Cluster   Right   Left   Ligament Tests   Right   Left    1. > 51 y/o           Lachman          2. Stiffness > 30 min.           Anterior Drawer          3. Crepitus           Posterior Drawer          4. Bony tenderness           Posterior Sag          5. Bone enlargement           Valgus Stress          6. No warmth to the touch           Varus Stress           Meniscal Tests   Right   Left    Other   Right    Left       Miguelangel's           Ely's             Joint line tenderness           Leticia             Thessaly Thomas Apley's                        Treatment Today   11/2/2018    TREATMENT MINUTES COMMENTS   Evaluation 25 Knee   Self-care/ Home management     Manual therapy     Neuromuscular Re-education     Therapeutic  Activity     Therapeutic Exercises 25 See flow sheet   Gait training     Modality__________________                Total 50    Blank areas are intentional and mean the treatment did not include these items.     PT Evaluation Code: (Please list factors)  Patient History/Comorbidities: see above  Examination: see above  Clinical Presentation: stable/uncomplicated  Clinical Decision Making: low    Patient History/  Comorbidities Examination  (body structures and functions, activity limitations, and/or participation restrictions) Clinical Presentation Clinical Decision Making (Complexity)   No documented Comorbidities or personal factors 1-2 Elements Stable and/or uncomplicated Low   1-2 documented comorbidities or personal factor 3 Elements Evolving clinical presentation with changing characteristics Moderate   3-4 documented comorbidities or personal factors 4 or more Unstable and unpredictable High              Elizabeth VIEIRA Talitadellalili  11/2/2018  1:55 PM      [facility] Discharge Summary  Patient Name: Jaylan Alberto  Date: 1/22/2019  Referral Diagnosis: Left Knee Instability  Referring provider: Rossy Aguilera MD  Visit Diagnosis:   1. Chronic pain of left knee     2. Decreased ROM of left knee     3. Weakness of left lower extremity         Goals: Progress toward goals per patient reports via phone contact on 12/14/2018.  Patient came for one visit only  Pt. will demonstrate/verbalize independence in self-management of condition in : 6 weeks  Pt. will be independent with home exercise program in : 6 weeks  Pt. will have improved quality of sleep: with less pain;in 6 weeks;Comment  Comment:: lessen medial /lateral knee pain when SL for sleep  Patient will ascend / descend: stairs;with railing;with recipricol gait;in 6 weeks;Comment  Comment:: routinely descend steps reciprocally with less pain        Patient was seen for 1 visit on 11/2/2018 with 1 missed appointment.  The patient reported making progress  toward goals, that he was doing well and knee is better and has demonstrated understanding of and independence in the home program for self-care, and progression to next steps.  He will initiate contact if questions or concerns arise.  The patient was instructed to follow up with physician's clinic as needed.  Patient received a home program Pelvic and knee strengthening  The patient discontinued therapy, did not return.  The patient was issued theraband and instructed in proper usage.    Therapy will be discontinued at this time.  The patient will need a new referral to resume.    Thank you for your referral.  Elizabeth Amador  1/22/2019  1:17 PM

## 2021-06-22 NOTE — ANESTHESIA CARE TRANSFER NOTE
Last vitals:   Vitals:    11/30/18 1320   BP: 146/71   Pulse: 66   Resp: 18   Temp:    SpO2: 99%     Patient's level of consciousness is drowsy  Spontaneous respirations: yes  Maintains airway independently: yes  Dentition unchanged: yes  Oropharynx: oropharynx clear of all foreign objects    QCDR Measures:  ASA# 20 - Surgical Safety Checklist: WHO surgical safety checklist completed prior to induction    PQRS# 430 - Adult PONV Prevention: 4558F - Pt received => 2 anti-emetic agents (different classes) preop & intraop  ASA# 8 - Peds PONV Prevention: NA - Not pediatric patient, not GA or 2 or more risk factors NOT present  PQRS# 424 - Aarti-op Temp Management: NA - MAC anesthesia or case < 60 minutes  PQRS# 426 - PACU Transfer Protocol: - Transfer of care checklist used  ASA# 14 - Acute Post-op Pain: ASA14B - Patient did NOT experience pain >= 7 out of 10

## 2021-06-22 NOTE — ANESTHESIA PREPROCEDURE EVALUATION
Anesthesia Evaluation      Patient summary reviewed   No history of anesthetic complications     Airway   Mallampati: II  Neck ROM: full   Pulmonary - normal exam    breath sounds clear to auscultation  (+) a smoker  (-) sleep apnea                         Cardiovascular   (+) hypertension, dysrhythmias, CHF, , hypercholesterolemia,     (-) angina, murmur  ECG reviewed  Rhythm: irregular  Rate: normal,    no murmur   ROS comment: 1.Left ventricle ejection fraction is decreased. The estimated left   ventricular ejection fraction is 55%.    2.Right Ventricle: Normal size and systolic function.    3.Moderately enlarged left atrium with spontaneous echo contrast seen,   however no thrombus appreciated in left atrial appendage which is normal   emptying velocities.    4.Mild to moderate mitral regurgitation and moderate aortic   insufficiency, with this jet directed along the anterior mitral valve   leaflet.    5.When compared to the previous study dated 3/3/2017, aortic   insufficiency is seen more clearly as is the mitral regurgitation. Overall   left ventricular ejection fraction appears to be mildly diminished   compared to prior.         Neuro/Psych - negative ROS     Endo/Other    (+) arthritis,      Comments: ED    GI/Hepatic/Renal    (+) GERD,       Comments: Prostate CA          Dental - normal exam                          Anesthesia Plan  Planned anesthetic: general mask and total IV anesthesia  Brevital   ASA 3   Induction: intravenous   Anesthetic plan and risks discussed with: patient and spouse    Post-op plan: routine recovery

## 2021-06-22 NOTE — PROGRESS NOTES
Preoperative Consultation   Jaylan Alberto   78 y.o.  male    Date of visit: 12/14/2018  Physician: Vincent Navarro MD    This is a preoperative consultation requested by Dr. Zelaya in preparation for cardioversion on 1/2/2019 at [facility].       Assessment and Plan   Jaylan Alberto was seen in preoperative consultation in preparation for cardioversion.  This is a low risk surgery and the patient has increased risk for major cardiac complications based on a history of congestive heart failure. Please note his congestive heart failure has been in the context of infection.  He has a normal ejection fraction of 55%.  Currently without clinical evidence of significant congestive heart failure.  He will continue Eliquis through the procedure.  He does not have a history of obstructive sleep apnea with an AHI of 3.2 on a sleep study in 2014.    1. Pre-operative examination    2. Mild mitral regurgitation    3. Moderate aortic insufficiency    4. Paroxysmal atrial fibrillation (H)    5. Status post catheter ablation of atrial fibrillation    6. Essential hypertension with goal blood pressure less than 140/90    7. Prostate Cancer    8. 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 Dominique Nye, a nephrologist.  He quit smoking cigarettes in 1982.  He does drink some alcohol, but not excessively.  He tries to walk several times a week.  He has 3 children (one son is biological (Paramjit) and his other son (Jeremi) and daughter (Tatiana Benson) are adopted) and 2 grandchildren.        Past Medical History   Patient Active Problem List   Diagnosis     Basal Cell Carcinoma Of The Skin Of The Face     Prostate Cancer     Hyperlipidemia     Essential hypertension with goal blood pressure less than 140/90     Male  Erectile Disorder     Esophageal Reflux     Osteoarthritis     Paroxysmal atrial fibrillation (H)     Status post catheter ablation of atrial fibrillation     Atypical atrial flutter (H)     Hip osteoarthritis     Mild mitral regurgitation     Moderate aortic insufficiency       Past Surgical History  He has a past surgical history that includes pr repair of hammertoe,one; pr removal of tonsils,<9 y/o; pr excision multiple external papillae/tags anus; pr corrj hallux valgus w/sesmdc w/rescj prox phal; pr excis tendon sheath lesion, hand/finger; pr transurethral elec-surg prostatectom; Cardiac catheterization (Nov 2014); Cardioversion; Cardiac electrophysiology mapping and ablation (5/26/17); Cataract extraction; Cardioversion (08/18/2017); Cardioversion (04/04/2018); LEFT TOTAL HIP ARTHROPLASTY (Left, 5/30/2018); EP Ablation PVI (Left, 5/26/2017); Intraprocedure External Cardioversion (N/A, 5/26/2017); EP Ablation Atrial Flutter (5/26/2017); and EXCISION BACK LESION (N/A, 7/7/2014).     History of Present Illness   This 78 y.o. old retired professor comes in for preoperative evaluation.  He is going to have a cardioversion at [facility] for atrial fibrillation.  He has a long history of atrial fibrillation and has been on numerous medications previously including flecainide, dofetilide, and amiodarone.  He underwent pulmonary vein isolation on 5/2017.  He had recurrence of atrial fibrillation after pulmonary vein isolation and was started on flecainide 80 mg twice daily.  He had previously had bradycardia with 120 mg twice daily.  He has been maintained on Eliquis for stroke prevention.  He is symptomatic with recurrences noting slight increase in shortness of breath as well as low energy level.  He does have a history of ingestive heart failure in the context of acute illness.  Most recent echocardiogram was March 2017 which showed ejection fraction 55%.  He currently generally feels okay with the exception  of mild increase in dyspnea.  No orthopnea.  Able to ambulate up stairs without significant difficulty.  No leg swelling.    Recent antiplatelet use: no  Personal or family history of bleeding or clotting disorders: yes on Eliquis  Steroid use in the past year: no  Personal or family history of difficulty with anesthesia: no  Current cardiopulmonary symptoms: no    Review of Systems: A comprehensive review of systems was negative except as noted.     Medications and Allergies   Current Outpatient Medications   Medication Sig Dispense Refill     cetirizine (ZYRTEC) 10 MG tablet Take 10 mg by mouth daily.       clotrimazole-betamethasone (LOTRISONE) lotion Apply to rash twice daily as needed 60 mL prn     diclofenac sodium (VOLTAREN) 1 % Gel APPLY 2-4 GRAMS TO AFFECTED AREAS INDICATED IN DIRECTIONS 100 g 1     ELIQUIS 5 mg Tab tablet TAKE 1 TABLET BY MOUTH TWICE DAILY 180 tablet 3     ketoconazole (NIZORAL) 2 % cream Apply 1 application topically 2 (two) times a day as needed. 15 g 0     losartan (COZAAR) 100 MG tablet Take 1 tablet (100 mg total) by mouth daily. 90 tablet 3     omeprazole (PRILOSEC) 20 MG capsule Take 20 mg by mouth daily with supper.        polyvinyl alcohol (LIQUIFILM TEARS) 1.4 % ophthalmic solution Administer 1 drop to both eyes as needed for dry eyes.       sotalol (BETAPACE) 80 MG tablet Take 1 tablet (80 mg total) by mouth every 12 (twelve) hours. (Patient taking differently: Take 120 mg by mouth 2 (two) times a day .      ) 180 tablet 1     No current facility-administered medications for this visit.      Allergies   Allergen Reactions     Chocolate Flavor Wheezing     No Known Drug Allergies         Family and Social History   Family History   Problem Relation Age of Onset     Stroke Mother          age 68     Depression Father          of suicide age 73     Atrial fibrillation Brother      No Medical Problems Son      No Medical Problems Son         Adopted     Mental illness  "Daughter         Adopted        Social History     Tobacco Use     Smoking status: Former Smoker     Packs/day: 1.00     Years: 30.00     Pack years: 30.00     Last attempt to quit: 1983     Years since quittin.9     Smokeless tobacco: Never Used   Substance Use Topics     Alcohol use: Yes     Alcohol/week: 0.6 oz     Types: 1 Cans of beer per week     Comment: daily     Drug use: No        Physical Exam   General Appearance:   No acute distress    /64 (Patient Site: Right Arm, Patient Position: Sitting, Cuff Size: Adult Regular)   Pulse 86   Ht 5' 8.5\" (1.74 m)   Wt 198 lb (89.8 kg)   SpO2 100%   BMI 29.67 kg/m      EYES: Eyelids, conjunctiva, and sclera were normal. Pupils were normal. Cornea, iris, and lens were normal bilaterally.  HEAD, EARS, NOSE, MOUTH, AND THROAT: Head and face were normal. Hearing was normal to voice and the ears were normal to external exam. Nose appearance was normal and there was no discharge. Oropharynx was normal.  NECK: Neck appearance was normal. There were no neck masses and the thyroid was not enlarged.  RESPIRATORY: Breathing pattern was normal and the chest moved symmetrically.  Percussion/auscultatory percussion was normal.  Lung sounds were normal and there were no abnormal sounds.  CARDIOVASCULAR: Heart rate is controlled, rhythm irregular.  S1 and S2 were normal and there were no extra sounds or murmurs. Peripheral pulses in arms and legs were normal.  Jugular venous pressure was normal.  There was no peripheral edema.  GASTROINTESTINAL: The abdomen was normal in contour.  Bowel sounds were present.  Percussion detected no organ enlargement or tenderness.  Palpation detected no tenderness, mass, or enlarged organs.   MUSCULOSKELETAL: Skeletal configuration was normal and muscle mass was normal for age. Joint appearance was overall normal.  LYMPHATIC: There were no enlarged nodes.  SKIN/HAIR/NAILS: Skin color was normal.  There were no skin lesions.  Hair " and nails were normal.  NEUROLOGIC: The patient was alert and oriented to person, place, time, and circumstance. Speech was normal. Cranial nerves were normal. Motor strength was normal for age. The patient was normally coordinated.  PSYCHIATRIC:  Mood and affect were normal and the patient had normal recent and remote memory. The patient's judgment and insight were normal.     Additional Tests   Laboratory: Potassium and creatinine are pending, recently okay in October 2018    Radiology: Reviewed including echocardiogram as outlined above    Electrocardiogram: Atrial fibrillation with controlled ventricular response, PVCs and a QTC of 484 ms    Total time spent with the patient today was 40 minutes of which > 50% was spent in counseling and coordination of care     Vincent Navarro MD  Internal Medicine  Contact me at [phone]

## 2021-06-22 NOTE — ANESTHESIA POSTPROCEDURE EVALUATION
Patient: Jaylan Alberto  * No procedures listed *  Anesthesia type: general    Patient location: Telemetry/Step Down Unit  Last vitals:   Vitals:    11/30/18 1400   BP: 153/81   Pulse: 74   Resp: 22   Temp:    SpO2:      Post vital signs: stable  Level of consciousness: awake and responds to simple questions  Post-anesthesia pain: pain controlled  Post-anesthesia nausea and vomiting: no  Pulmonary: unassisted, return to baseline  Cardiovascular: stable and blood pressure at baseline  Hydration: adequate  Anesthetic events: no    QCDR Measures:  ASA# 11 - Aarti-op Cardiac Arrest: ASA11B - Patient did NOT experience unanticipated cardiac arrest  ASA# 12 - Aarti-op Mortality Rate: ASA12B - Patient did NOT die  ASA# 13 - PACU Re-Intubation Rate: NA - No ETT / LMA used for case  ASA# 10 - Composite Anes Safety: ASA10A - No serious adverse event    Additional Notes:

## 2021-06-25 NOTE — PROGRESS NOTES
Progress Notes by Kelley Taylor, RN at 3/9/2017  1:31 PM     Author: Kelley Taylor RN Service: -- Author Type: Registered Nurse    Filed: 3/9/2017  1:41 PM Encounter Date: 3/9/2017 Status: Signed    : Kelley Taylor RN (Registered Nurse)       Noted.  Pt was seen in clinic by Gato Araujo 3-8-17.  Orders will be placed for PVI.      MD Kelley Newman RN Caroline,   See note below. Please put him on the schedule, but I would like him to see Gato or Andria in the meantime to make sure education is complete.   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  He would like to pursue afib ablation - he met you last year and would like to arrange ablation with him.   Thanks for your help.   Saleem

## 2021-06-26 NOTE — PROGRESS NOTES
Progress Notes by Temi Araujo CNP at 8/15/2018  9:10 AM     Author: Temi Araujo CNP Service: -- Author Type: Nurse Practitioner    Filed: 8/15/2018 12:14 PM Encounter Date: 8/15/2018 Status: Signed    : Temi Araujo CNP (Nurse Practitioner)          Click to link to [facility] Heart [facility] HEART [facility] ELECTROPHYSIOLOGY NOTE      Assessment/Recommendations   Assessment/Plan:    Diagnoses and all orders for this visit:    Atypical atrial flutter (H) present since August 7 and persistent.  Directly symptomatic as in the past.  Since there was no particular trigger for this episode,  I recommended increase in sotalol from 80 to 120 mg p.o. every 12 hours 2 days prior to cardioversion.  I discussed cardioversion and prep for it.  Questions answered.  He cannot plan it for Friday of this week so will be early next week.  He will get a call to confirm date and time of cardioversion tomorrow.  He will need evaluation of QTC and WA intervals post cardioversion to see if it is safe to stay on this dose of sotalol.  If so, he will need prescription sent through for 120 mg tablets with refill.  If Abrahan has early reversion back into atrial fib I recommend to consider switching sotalol to amiodarone.  -     ECG Clinic - Today  -     EP Cardioversion External; Future; Expected date: 8/15/18    Persistent atrial fibrillation (H) history.  -     sotalol (BETAPACE) 80 MG tablet; Take 1.5 tablets (120 mg total) by mouth every 12 (twelve) hours. Start 2 days before cardioversion  Dispense: 180 tablet; Refill: 1    Essential hypertension with goal blood pressure less than 140/90 and well controlled.  He is low normotensive today and unusual for him.    Other orders  -     Verify informed consent for Elective Cardioversion; Standing  -     Vital signs; Standing  -     NPO 8 hours prior to procedure; Standing  -     Notify Anesthesiologist -; Standing  -     Verify 100% compliance with oral  anti-coagulant over the past 3 weeks verbally with the patient prior to cardioversion. Notify procedularlist if missed doses.; Standing  -     Emergency equipment at bedside; Standing  -     Oxygen nasal cannula; Standing  -     Inpatient consult to Anesthesiology Reason for Consult? ICD/DFT; Did you contact the consulting MD? Yes; Consult priority: Today (routine); Communication for MD: No phone communication necessary for now; Standing  -     Insert and maintain IV; Standing  -     sodium chloride 0.9%; Infuse 25 mL/hr into a venous catheter continuous.  -     Saline lock IV; Standing  -     sodium chloride flush 3 mL (NS); Infuse 3 mL into a venous catheter Line Care.    [id number] score of 3 and on Eliquis and denies any missed doses in the last 3 weeks.  Follow up in clinic with me in 3-4 weeks post cardioversion.     History of Present Illness    Mr. Jaylan Alberto is a very pleasant 78 y.o. male who comes in today for urgent EP appointment secondary to recurrence of persistent atrial flutter on August 7 and has remained in atrial flutter since then.  He reports that initially his heart rates were running in the 90s-100s and over the last week or so they are running more 70s-80s but knows that he still out of rhythm as extremely exhausted.  Jaylan Alberto has a known history of persistent atrial fib and failed flecainide, dofetilide and amiodarone prior to pulmonary vein isolation ablation in May 2017 in which he had cryoablation of all 5 pulmonary veins, CFE, VIKKI and CTI flutter lines done.  He was noted to have severe left atrial fibrosis and is not a candidate for repeat ablation.  He had early recurrence of atrial fib and flutter and  started on sotalol post ablation.  His last cardioversion was in April 2018.  He had a left hip replacement on June 1.  He notes no particular trigger for this reoccurrence of A. flutter.  He is walking independently and no problems post hip replacement and is back to  his  normal activities before recurrence of atrial flutter.    Cardiographics (personally reviewed):  Results for orders placed during the hospital encounter of 05/26/17   Echo KAY W Bubble [ECH12] 05/26/2017    Narrative   1.Left ventricle ejection fraction is decreased. The estimated left   ventricular ejection fraction is 55%.    2.Right Ventricle: Normal size and systolic function.    3.Moderately enlarged left atrium with spontaneous echo contrast seen,   however no thrombus appreciated in left atrial appendage which is normal   emptying velocities.    4.Mild to moderate mitral regurgitation and moderate aortic   insufficiency, with this jet directed along the anterior mitral valve   leaflet.    5.When compared to the previous study dated 3/3/2017, aortic   insufficiency is seen more clearly as is the mitral regurgitation. Overall   left ventricular ejection fraction appears to be mildly diminished   compared to prior.      August 2017 2 weeks symptom monitor shows:  Essentially normal multi day patient activated monitor.  No atrial fibrillation or flutter seen.  No profound bradycardia or pauses.  Average heart rate was 65 throughout the time period.    Results for orders placed or performed in visit on 08/15/18   ECG Clinic - Today   Result Value Ref Range    SYSTOLIC BLOOD PRESSURE  mmHg    DIASTOLIC BLOOD PRESSURE  mmHg    VENTRICULAR RATE 76 BPM    ATRIAL RATE 288 BPM    P-R INTERVAL  ms    QRS DURATION 108 ms    Q-T INTERVAL 408 ms    QTC CALCULATION (BEZET) 459 ms    P Axis 84 degrees    R AXIS 5 degrees    T AXIS 53 degrees    MUSE DIAGNOSIS       Atrial flutter with variable A-V block  Abnormal ECG  When compared with ECG of 04-APR-2018 13:54,  Atrial flutter has replaced Sinus rhythm            Problem List:  Patient Active Problem List   Diagnosis   ? Basal Cell Carcinoma Of The Skin Of The Face   ? Prostate Cancer   ? Hyperlipidemia   ? Essential hypertension with goal blood pressure less than 140/90   ?  Male Erectile Disorder   ? Esophageal Reflux   ? Osteoarthritis   ? History of snoring   ? Persistent atrial fibrillation (H)   ? Status post catheter ablation of atrial fibrillation   ? Atypical atrial flutter (H)   ? Hip osteoarthritis       Physical Examination Review of Systems   Vitals:    08/15/18 0926   BP: 92/56   Pulse: 76   Resp: 16     Body mass index is 30.12 kg/(m^2).  Wt Readings from Last 3 Encounters:   08/15/18 201 lb (91.2 kg)   06/08/18 204 lb (92.5 kg)   05/30/18 194 lb (88 kg)     General Appearance:   Alert, well-appearing and in no acute distress.   HEENT: Atraumatic, normocephalic.  No scleral icterus, normal conjunctivae; mucous membranes pink and moist.     Chest: Chest symmetric, spine straight.   Lungs:   Respirations unlabored: Lungs are clear to auscultation.   Cardiovascular:   Normal first and second heart sounds with no murmurs, rubs, or gallops.  Irregular, irregular.  Radial and posterior tibial pulses are intact.  Normal JVD, no edema.       Extremities: No cyanosis or clubbing   Musculoskeletal: Moves all extremities   Skin: Warm, dry, intact.    Neurologic: Mood and affect are appropriate, alert and oriented to person, place, time, and situation    General: WNL  Eyes: WNL  Ears/Nose/Throat: WNL  Lungs: WNL  Heart: Irregular Heartbeat  Stomach: WNL  Bladder: WNL  Muscle/Joints: WNL  Skin: WNL  Nervous System: WNL  Mental Health: WNL     Blood: WNL       Medical History  Surgical History Family History Social History   Past Medical History:   Diagnosis Date   ? Arthritis     Generalized   ? Atrial fibrillation (H)    ? Cancer (H)     Prostate and skin   ? CHF (congestive heart failure) (H)     PMH   ? GERD (gastroesophageal reflux disease)    ? Hyperlipidemia    ? Hypertension    ? Status post catheter ablation of atrial fibrillation 5/26/2017    PVI May 26, 2017 (cryo-PVI + CFE + VIKKI line + CTI line)    Past Surgical History:   Procedure Laterality Date   ? CARDIAC  CATHETERIZATION  2014   ? CARDIAC ELECTROPHYSIOLOGY MAPPING AND ABLATION  17    PVI   ? CARDIOVERSION      16, 10/10/16, 3/22/17   ? CARDIOVERSION  2017   ? CARDIOVERSION  2018   ? CATARACT EXTRACTION     ? NM CORRJ HALLUX VALGUS W/SESMDC W/RESCJ PROX PHAL      Description: Bunion Correction By Cheilectomy;  Recorded: 2011;   ? NM EPHYS EVAL W/ABLATION SUPRAVENT ARRHYTHMIA  2017    Procedure: EP Ablation Atrial Flutter;  Surgeon: Orlin Dailey MD;  Location: [facility] Cath Lab;  Service: Cardiology   ? NM EPHYS EVL TRNSPTL TX ATRIAL FIB ISOLAT PULM VEIN Left 2017    Procedure: EP Ablation PVI;  Surgeon: Orlin Dailey MD;  Location: [facility] Cath Lab;  Service: Cardiology   ? NM EXCIS TENDON SHEATH LESION, HAND/FINGER      Description: Hand Excision Of A Tendon Cyst;  Recorded: 2011;  Comments: thumb   ? NM EXCISION MULTIPLE EXTERNAL PAPILLAE/TAGS ANUS      Description: Hemorrhoidectomy Excision Of External Tags;  Recorded: 2011;   ? NM REMOVAL OF TONSILS,<9 Y/O      Description: Tonsillectomy;  Recorded: 2011;   ? NM REPAIR OF HAMMERTOE,ONE      Description: Arthroplasty For Hammertoe;  Recorded: 2011;   ? NM TOTAL HIP ARTHROPLASTY Left 2018    Procedure:  LEFT TOTAL HIP ARTHROPLASTY;  Surgeon: Yosef Mason MD;  Location: [facility];  Service: Orthopedics   ? NM TRANSURETHRAL ELEC-SURG PROSTATECTOM      Description: Transurethral Resection Of Prostate (TURP);  Recorded: 2011;  Comments: Dr. Lopez  with small foci of Pr CA present    Family History   Problem Relation Age of Onset   ? Stroke Mother       age 68   ? Depression Father       of suicide age 73   ? Atrial fibrillation Brother     Social History     Social History   ? Marital status:      Spouse name: N/A   ? Number of children: N/A   ? Years of education: N/A     Occupational History   ? retired      Social History Main Topics   ?  Smoking status: Former Smoker     Packs/day: 1.00     Years: 30.00     Quit date: 1/14/1983   ? Smokeless tobacco: Never Used   ? Alcohol use 0.6 oz/week     1 Cans of beer per week      Comment: daily   ? Drug use: No   ? Sexual activity: Not on file     Other Topics Concern   ? 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  He quit smoking cigarettes in 1982.  He does drink some alcohol, but not excessively.  He tries to walk several times a week.  He has 3 children (one son is biological and his other son and daughter are adopted) and 2 grandchildren.          Medications  Allergies   Current Outpatient Prescriptions   Medication Sig Dispense Refill   ? acetaminophen (TYLENOL) 500 MG tablet Take 2 tablets (1,000 mg total) by mouth 3 (three) times a day.  0   ? aspirin 81 MG EC tablet Take 1 tablet (81 mg total) by mouth daily.  0   ? cetirizine (ZYRTEC) 10 MG tablet Take 10 mg by mouth daily.     ? clotrimazole-betamethasone (LOTRISONE) lotion Apply to rash twice daily as needed 60 mL prn   ? diclofenac sodium (VOLTAREN) 1 % Gel APPLY 2-4 GRAMS TO AFFECTED AREAS INDICATED IN DIRECTIONS 100 g 1   ? ELIQUIS 5 mg Tab tablet TAKE 1 TABLET (5 MG TOTAL) BY MOUTH 2 (TWO) TIMES A DAY. 180 tablet 3   ? ferrous sulfate 325 (65 FE) MG tablet Take 1 tablet (325 mg total) by mouth daily with breakfast. 60 tablet 3   ? ketoconazole (NIZORAL) 2 % cream Apply 1 application topically 2 (two) times a day as needed. 15 g 0   ? lidocaine (LIDODERM) 5 % Remove & Discard patch within 12 hours or as directed by MD 30 patch prn   ? losartan (COZAAR) 100 MG tablet Take 1 tablet (100 mg total) by mouth daily. 90 tablet 3   ? omeprazole (PRILOSEC) 20 MG capsule Take 20 mg by mouth daily with supper.      ? polyvinyl alcohol (LIQUIFILM TEARS) 1.4 % ophthalmic solution Administer 1  drop to both eyes as needed for dry eyes.     ? sotalol (BETAPACE) 80 MG tablet Take 1.5 tablets (120 mg total) by mouth every 12 (twelve) hours. Start 2 days before cardioversion 180 tablet 1     No current facility-administered medications for this visit.       Allergies   Allergen Reactions   ? Chocolate Flavor Wheezing   ? No Known Drug Allergies       Medical, surgical, family, social history, and medications were all reviewed and updated as necessary.   Lab Results    Chemistry CBC/INR CHOLESTROL   Lab Results   Component Value Date    CREATININE 0.77 05/01/2018    BUN 16 05/01/2018     (L) 05/01/2018    K 4.8 05/01/2018    CL 99 05/01/2018    CO2 26 05/01/2018     Creatinine (mg/dL)   Date Value   05/01/2018 0.77   10/04/2017 0.78   05/30/2017 0.79   05/29/2017 0.80     Lab Results   Component Value Date     (H) 05/28/2017    Lab Results   Component Value Date    WBC 8.9 06/08/2018    HGB 10.8 (L) 06/08/2018    HCT 31.2 (L) 06/08/2018    MCV 91 06/08/2018     06/08/2018     Lab Results   Component Value Date    INR 1.43 (H) 10/13/2016      Lab Results   Component Value Date    CHOL 168 10/04/2017    HDL 34 (L) 10/04/2017    LDLCALC 122 10/04/2017    TRIG 62 10/04/2017          Total Time- 45 minutes with greater than 50% spent talking to patient and family regarding patient's relevant diagnoses.  This note has been dictated using voice recognition software. Any grammatical, typographical, or context distortions are unintentional and inherent to the software.    Temi Araujo RN,  [facility]   Electrophysiology  [phone]

## 2021-07-03 NOTE — ADDENDUM NOTE
Addendum Note by Sharon Gonzalez RN at 8/20/2018  9:55 AM     Author: Sharon Gonzalez RN Service: -- Author Type: Registered Nurse    Filed: 8/20/2018  9:55 AM Encounter Date: 8/20/2018 Status: Signed    : Sharon Gonzalez RN (Registered Nurse)    Addended by: SHARON GONZALEZ on: 8/20/2018 09:55 AM        Modules accepted: Orders

## 2021-07-03 NOTE — ADDENDUM NOTE
Addendum Note by Sharon Gonzalez RN at 4/27/2017  9:14 AM     Author: Sharon Gonzalez RN Service: -- Author Type: Registered Nurse    Filed: 4/27/2017  9:14 AM Encounter Date: 4/26/2017 Status: Signed    : Sharon Gonzalez RN (Registered Nurse)    Addended by: SHARON GONZALEZ on: 4/27/2017 09:14 AM        Modules accepted: Orders

## 2021-07-03 NOTE — ANESTHESIA PREPROCEDURE EVALUATION
Anesthesia Preprocedure Evaluation by Farideh Cobb MD at 5/25/2017  6:55 PM     Author: Farideh Cobb MD Service: -- Author Type: Physician    Filed: 5/26/2017  7:17 AM Date of Service: 5/25/2017  6:55 PM Status: Addendum    : Farideh Cobb MD (Physician)    Related Notes: Original Note by Farideh Cobb MD (Physician) filed at 5/25/2017  6:58 PM       Anesthesia Evaluation      Patient summary reviewed   No history of anesthetic complications     Airway   Mallampati: I  Neck ROM: full   Pulmonary - normal exam    breath sounds clear to auscultation  (+) a smoker (Former, 30 pack year, quit 1983)                         Cardiovascular - negative ROS  Exercise tolerance: > or = 4 METS  (+) hypertension well controlled, dysrhythmias (a.fib), CHF, , hypercholesterolemia,     (-) murmur  ECG reviewed  Rhythm: irregular   no murmur      Neuro/Psych - negative ROS     Endo/Other    (+) arthritis,      Comments: Prostate Ca    GI/Hepatic/Renal    (+) hiatal hernia, GERD well controlled,        Other findings: 3/3/17 Echo  Summary       When compared to the previous study dated 11/24/2013, there is no significant change.    Left ventricle ejection fraction is normal. The calculated left ventricular ejection fraction is 59%.    Normal right ventricular size and function.    Mild aortic valve regurgitation.          Dental    (+) bridge                           Anesthesia Plan  Planned anesthetic: general endotracheal  Modified RSI  ASA 3   Induction: intravenous   Anesthetic plan and risks discussed with: patient and spouse  Anesthesia plan special considerations: antiemetics,   Post-op plan: routine recovery

## 2021-07-10 ENCOUNTER — HEALTH MAINTENANCE LETTER (OUTPATIENT)
Age: 81
End: 2021-07-10

## 2021-09-04 ENCOUNTER — HEALTH MAINTENANCE LETTER (OUTPATIENT)
Age: 81
End: 2021-09-04

## 2022-08-06 ENCOUNTER — HEALTH MAINTENANCE LETTER (OUTPATIENT)
Age: 82
End: 2022-08-06

## 2022-10-18 ENCOUNTER — LAB (OUTPATIENT)
Dept: LAB | Facility: CLINIC | Age: 82
End: 2022-10-18
Payer: COMMERCIAL

## 2022-10-18 DIAGNOSIS — Z20.822 ENCOUNTER FOR LABORATORY TESTING FOR COVID-19 VIRUS: ICD-10-CM

## 2022-10-18 LAB — SARS-COV-2 RNA RESP QL NAA+PROBE: NEGATIVE

## 2022-10-18 PROCEDURE — U0005 INFEC AGEN DETEC AMPLI PROBE: HCPCS

## 2022-10-18 PROCEDURE — U0003 INFECTIOUS AGENT DETECTION BY NUCLEIC ACID (DNA OR RNA); SEVERE ACUTE RESPIRATORY SYNDROME CORONAVIRUS 2 (SARS-COV-2) (CORONAVIRUS DISEASE [COVID-19]), AMPLIFIED PROBE TECHNIQUE, MAKING USE OF HIGH THROUGHPUT TECHNOLOGIES AS DESCRIBED BY CMS-2020-01-R: HCPCS

## 2022-10-20 ENCOUNTER — HOSPITAL ENCOUNTER (OUTPATIENT)
Facility: HOSPITAL | Age: 82
Discharge: HOME | End: 2022-10-20
Attending: COLON & RECTAL SURGERY | Admitting: COLON & RECTAL SURGERY
Payer: COMMERCIAL

## 2022-10-20 ENCOUNTER — ANESTHESIA (OUTPATIENT)
Dept: SURGERY | Facility: HOSPITAL | Age: 82
End: 2022-10-20
Payer: COMMERCIAL

## 2022-10-20 ENCOUNTER — ANESTHESIA EVENT (OUTPATIENT)
Dept: SURGERY | Facility: HOSPITAL | Age: 82
End: 2022-10-20
Payer: COMMERCIAL

## 2022-10-20 VITALS
TEMPERATURE: 98 F | SYSTOLIC BLOOD PRESSURE: 118 MMHG | HEART RATE: 89 BPM | DIASTOLIC BLOOD PRESSURE: 67 MMHG | RESPIRATION RATE: 18 BRPM | OXYGEN SATURATION: 94 %

## 2022-10-20 PROCEDURE — 999N000141 HC STATISTIC PRE-PROCEDURE NURSING ASSESSMENT: Performed by: COLON & RECTAL SURGERY

## 2022-10-20 PROCEDURE — 250N000011 HC RX IP 250 OP 636: Performed by: NURSE ANESTHETIST, CERTIFIED REGISTERED

## 2022-10-20 PROCEDURE — 250N000009 HC RX 250: Performed by: NURSE ANESTHETIST, CERTIFIED REGISTERED

## 2022-10-20 PROCEDURE — 272N000001 HC OR GENERAL SUPPLY STERILE: Performed by: COLON & RECTAL SURGERY

## 2022-10-20 PROCEDURE — 710N000012 HC RECOVERY PHASE 2, PER MINUTE: Performed by: COLON & RECTAL SURGERY

## 2022-10-20 PROCEDURE — 370N000017 HC ANESTHESIA TECHNICAL FEE, PER MIN: Performed by: COLON & RECTAL SURGERY

## 2022-10-20 PROCEDURE — 258N000003 HC RX IP 258 OP 636: Performed by: NURSE ANESTHETIST, CERTIFIED REGISTERED

## 2022-10-20 PROCEDURE — 360N000075 HC SURGERY LEVEL 2, PER MIN: Performed by: COLON & RECTAL SURGERY

## 2022-10-20 RX ORDER — ONDANSETRON 4 MG/1
4 TABLET, ORALLY DISINTEGRATING ORAL
Status: DISCONTINUED | OUTPATIENT
Start: 2022-10-20 | End: 2022-10-20 | Stop reason: HOSPADM

## 2022-10-20 RX ORDER — OXYCODONE HYDROCHLORIDE 5 MG/1
5 TABLET ORAL EVERY 4 HOURS PRN
Status: DISCONTINUED | OUTPATIENT
Start: 2022-10-20 | End: 2022-10-20 | Stop reason: HOSPADM

## 2022-10-20 RX ORDER — NALOXONE HYDROCHLORIDE 0.4 MG/ML
0.4 INJECTION, SOLUTION INTRAMUSCULAR; INTRAVENOUS; SUBCUTANEOUS
Status: DISCONTINUED | OUTPATIENT
Start: 2022-10-20 | End: 2022-10-20 | Stop reason: HOSPADM

## 2022-10-20 RX ORDER — ONDANSETRON 2 MG/ML
4 INJECTION INTRAMUSCULAR; INTRAVENOUS EVERY 30 MIN PRN
Status: DISCONTINUED | OUTPATIENT
Start: 2022-10-20 | End: 2022-10-20 | Stop reason: HOSPADM

## 2022-10-20 RX ORDER — HYDROMORPHONE HYDROCHLORIDE 1 MG/ML
0.2 INJECTION, SOLUTION INTRAMUSCULAR; INTRAVENOUS; SUBCUTANEOUS EVERY 5 MIN PRN
Status: DISCONTINUED | OUTPATIENT
Start: 2022-10-20 | End: 2022-10-20 | Stop reason: HOSPADM

## 2022-10-20 RX ORDER — FENTANYL CITRATE 50 UG/ML
25 INJECTION, SOLUTION INTRAMUSCULAR; INTRAVENOUS EVERY 5 MIN PRN
Status: DISCONTINUED | OUTPATIENT
Start: 2022-10-20 | End: 2022-10-20 | Stop reason: HOSPADM

## 2022-10-20 RX ORDER — METOPROLOL TARTRATE 50 MG
50 TABLET ORAL 2 TIMES DAILY
COMMUNITY

## 2022-10-20 RX ORDER — LIDOCAINE 40 MG/G
CREAM TOPICAL
Status: DISCONTINUED | OUTPATIENT
Start: 2022-10-20 | End: 2022-10-20 | Stop reason: HOSPADM

## 2022-10-20 RX ORDER — SODIUM CHLORIDE, SODIUM LACTATE, POTASSIUM CHLORIDE, CALCIUM CHLORIDE 600; 310; 30; 20 MG/100ML; MG/100ML; MG/100ML; MG/100ML
INJECTION, SOLUTION INTRAVENOUS CONTINUOUS
Status: DISCONTINUED | OUTPATIENT
Start: 2022-10-20 | End: 2022-10-20 | Stop reason: HOSPADM

## 2022-10-20 RX ORDER — ONDANSETRON 2 MG/ML
INJECTION INTRAMUSCULAR; INTRAVENOUS PRN
Status: DISCONTINUED | OUTPATIENT
Start: 2022-10-20 | End: 2022-10-20

## 2022-10-20 RX ORDER — NALOXONE HYDROCHLORIDE 0.4 MG/ML
0.2 INJECTION, SOLUTION INTRAMUSCULAR; INTRAVENOUS; SUBCUTANEOUS
Status: DISCONTINUED | OUTPATIENT
Start: 2022-10-20 | End: 2022-10-20 | Stop reason: HOSPADM

## 2022-10-20 RX ORDER — LIDOCAINE HYDROCHLORIDE 10 MG/ML
INJECTION, SOLUTION INFILTRATION; PERINEURAL PRN
Status: DISCONTINUED | OUTPATIENT
Start: 2022-10-20 | End: 2022-10-20

## 2022-10-20 RX ORDER — ONDANSETRON 4 MG/1
4 TABLET, ORALLY DISINTEGRATING ORAL EVERY 30 MIN PRN
Status: DISCONTINUED | OUTPATIENT
Start: 2022-10-20 | End: 2022-10-20 | Stop reason: HOSPADM

## 2022-10-20 RX ORDER — PROPOFOL 10 MG/ML
INJECTION, EMULSION INTRAVENOUS CONTINUOUS PRN
Status: DISCONTINUED | OUTPATIENT
Start: 2022-10-20 | End: 2022-10-20

## 2022-10-20 RX ORDER — SODIUM CHLORIDE, SODIUM LACTATE, POTASSIUM CHLORIDE, CALCIUM CHLORIDE 600; 310; 30; 20 MG/100ML; MG/100ML; MG/100ML; MG/100ML
INJECTION, SOLUTION INTRAVENOUS CONTINUOUS PRN
Status: DISCONTINUED | OUTPATIENT
Start: 2022-10-20 | End: 2022-10-20

## 2022-10-20 RX ORDER — PROPOFOL 10 MG/ML
INJECTION, EMULSION INTRAVENOUS PRN
Status: DISCONTINUED | OUTPATIENT
Start: 2022-10-20 | End: 2022-10-20

## 2022-10-20 RX ORDER — FENTANYL CITRATE 50 UG/ML
25 INJECTION, SOLUTION INTRAMUSCULAR; INTRAVENOUS
Status: DISCONTINUED | OUTPATIENT
Start: 2022-10-20 | End: 2022-10-20 | Stop reason: HOSPADM

## 2022-10-20 RX ORDER — MEPERIDINE HYDROCHLORIDE 25 MG/ML
12.5 INJECTION INTRAMUSCULAR; INTRAVENOUS; SUBCUTANEOUS
Status: DISCONTINUED | OUTPATIENT
Start: 2022-10-20 | End: 2022-10-20 | Stop reason: HOSPADM

## 2022-10-20 RX ADMIN — SODIUM CHLORIDE, POTASSIUM CHLORIDE, SODIUM LACTATE AND CALCIUM CHLORIDE: 600; 310; 30; 20 INJECTION, SOLUTION INTRAVENOUS at 14:19

## 2022-10-20 RX ADMIN — PHENYLEPHRINE HYDROCHLORIDE 200 MCG: 10 INJECTION INTRAVENOUS at 14:25

## 2022-10-20 RX ADMIN — PHENYLEPHRINE HYDROCHLORIDE 200 MCG: 10 INJECTION INTRAVENOUS at 14:34

## 2022-10-20 RX ADMIN — PROPOFOL 50 MG: 10 INJECTION, EMULSION INTRAVENOUS at 14:21

## 2022-10-20 RX ADMIN — LIDOCAINE HYDROCHLORIDE 5 ML: 10 INJECTION, SOLUTION INFILTRATION; PERINEURAL at 14:21

## 2022-10-20 RX ADMIN — PHENYLEPHRINE HYDROCHLORIDE 300 MCG: 10 INJECTION INTRAVENOUS at 14:28

## 2022-10-20 RX ADMIN — ONDANSETRON 4 MG: 2 INJECTION INTRAMUSCULAR; INTRAVENOUS at 14:22

## 2022-10-20 RX ADMIN — PROPOFOL 150 MCG/KG/MIN: 10 INJECTION, EMULSION INTRAVENOUS at 14:21

## 2022-10-20 ASSESSMENT — ENCOUNTER SYMPTOMS: DYSRHYTHMIAS: 1

## 2022-10-20 ASSESSMENT — LIFESTYLE VARIABLES: TOBACCO_USE: 1

## 2022-10-20 ASSESSMENT — ACTIVITIES OF DAILY LIVING (ADL): ADLS_ACUITY_SCORE: 35

## 2022-10-20 NOTE — ANESTHESIA CARE TRANSFER NOTE
Patient: Jaylan Alberto    Procedure: Procedure(s):  COLONOSCOPY       Diagnosis: Rectal bleeding [K62.5]  Diagnosis Additional Information: No value filed.    Anesthesia Type:   MAC     Note:    Oropharynx: oropharynx clear of all foreign objects and spontaneously breathing  Level of Consciousness: awake  Oxygen Supplementation: room air    Independent Airway: airway patency satisfactory and stable  Dentition: dentition unchanged  Vital Signs Stable: post-procedure vital signs reviewed and stable  Report to RN Given: handoff report given  Patient transferred to: Phase II    Handoff Report: Identifed the Patient, Identified the Reponsible Provider, Reviewed the pertinent medical history, Discussed the surgical course, Reviewed Intra-OP anesthesia mangement and issues during anesthesia, Set expectations for post-procedure period and Allowed opportunity for questions and acknowledgement of understanding      Vitals:  Vitals Value Taken Time   BP 97/65 10/20/22 1444   Temp 98    Pulse 86 10/20/22 1444   Resp 14    SpO2 99 % 10/20/22 1444   Vitals shown include unvalidated device data.    Electronically Signed By: SHARONA Marques CRNA  October 20, 2022  2:46 PM

## 2022-10-20 NOTE — ANESTHESIA PREPROCEDURE EVALUATION
Anesthesia Pre-Procedure Evaluation    Patient: Jaylan Alberto   MRN: [id number] : 1940        Procedure : Procedure(s):  COLONOSCOPY          Past Medical History:   Diagnosis Date     Hypertension       Past Surgical History:   Procedure Laterality Date     CARDIAC CATHETERIZATION  2014     CARDIAC ELECTROPHYSIOLOGY MAPPING AND ABLATION  17    PVI     CARDIOVERSION      16, 10/10/16, 3/22/17     CARDIOVERSION  2017     CARDIOVERSION  2018     CATARACT EXTRACTION       HC CORRECT BUNION,VILLANUEVA/CHARLEY/CHILEL      Description: Bunion Correction By Cheilectomy;  Recorded: 2011;     HC REMOVAL OF TONSILS,<13 Y/O      Description: Tonsillectomy;  Recorded: 2011;     HC REPAIR OF HAMMERTOE,ONE      Description: Arthroplasty For Hammertoe;  Recorded: 2011;     HC TRANSURETHRAL ELEC-SURG PROSTATECTOM      Description: Transurethral Resection Of Prostate (TURP);  Recorded: 2011;  Comments: Dr. Lopez  with small foci of Pr CA present     IN EPHYS EVAL W/ABLATION SUPRAVENT ARRHYTHMIA  2017    Procedure: EP Ablation Atrial Flutter;  Surgeon: Orlin Dailey MD;  Location: [facility] Cath Lab;  Service: Cardiology     IN EPHYS EVL TRNSPTL TX ATRIAL FIB ISOLAT PULM VEIN Left 2017    Procedure: EP Ablation PVI;  Surgeon: Orlin Dailey MD;  Location: [facility] Cath Lab;  Service: Cardiology     IN EXCIS TENDON SHEATH LESION, HAND/FINGER      Description: Hand Excision Of A Tendon Cyst;  Recorded: 2011;  Comments: thumb     IN EXCISION MULTIPLE EXTERNAL PAPILLAE/TAGS ANUS      Description: Hemorrhoidectomy Excision Of External Tags;  Recorded: 2011;     ZZC TOTAL HIP ARTHROPLASTY Left 2018    Procedure:  LEFT TOTAL HIP ARTHROPLASTY;  Surgeon: Yosef Mason MD;  Location: [facility];  Service: Orthopedics      Allergies   Allergen Reactions     Chocolate Flavor Other (See Comments)     Sneezing     No Known Drug Allergies  Unknown      Social History     Tobacco Use     Smoking status: Former     Packs/day: 1.00     Years: 30.00     Pack years: 30.00     Types: Cigarettes     Quit date: 1983     Years since quittin.7     Smokeless tobacco: Never   Substance Use Topics     Alcohol use: Yes     Alcohol/week: 1.0 standard drink     Comment: Alcoholic Drinks/day: daily      Wt Readings from Last 1 Encounters:   18 89.8 kg (198 lb)        Anesthesia Evaluation   Pt has had prior anesthetic. Type: General and MAC.    No history of anesthetic complications       ROS/MED HX  ENT/Pulmonary:     (+) tobacco use, Past use,     Neurologic:  - neg neurologic ROS     Cardiovascular:     (+) Dyslipidemia hypertension-----dysrhythmias, a-fib, valvular problems/murmurs moderate MR and AI.     METS/Exercise Tolerance: 4 - Raking leaves, gardening    Hematologic:       Musculoskeletal:   (+) arthritis,     GI/Hepatic:     (+) GERD,     Renal/Genitourinary:  - neg Renal ROS     Endo:  - neg endo ROS     Psychiatric/Substance Use:  - neg psychiatric ROS  (-) alcohol abuse history   Infectious Disease:  - neg infectious disease ROS     Malignancy:   (+) Malignancy, History of Prostate.    Other:            Physical Exam    Airway  airway exam normal      Mallampati: II   TM distance: > 3 FB   Neck ROM: full   Mouth opening: > 3 cm    Respiratory Devices and Support         Dental  no notable dental history         Cardiovascular   cardiovascular exam normal       Rhythm and rate: regular and normal     Pulmonary   pulmonary exam normal        breath sounds clear to auscultation           OUTSIDE LABS:  CBC:   Lab Results   Component Value Date    WBC 7.2 2018    WBC 6.0 10/08/2018    HGB 13.7 (L) 2018    HGB 13.4 (L) 10/08/2018    HCT 40.2 2018    HCT 39.0 (L) 10/08/2018     2018     10/08/2018     BMP:   Lab Results   Component Value Date     2018     (L) 10/08/2018    POTASSIUM 4.0  12/27/2018    POTASSIUM 4.3 12/14/2018    CHLORIDE 101 12/27/2018    CHLORIDE 100 10/08/2018    CO2 24 12/27/2018    CO2 26 10/08/2018    BUN 16 12/27/2018    BUN 16 10/08/2018    CR 0.77 12/27/2018    CR 0.79 12/14/2018    GLC 88 12/27/2018    GLC 87 10/08/2018     COAGS: No results found for: PTT, INR, FIBR  POC: No results found for: BGM, HCG, HCGS  HEPATIC:   Lab Results   Component Value Date    ALBUMIN 4.0 10/08/2018    PROTTOTAL 6.6 10/08/2018    ALT 13 10/08/2018    AST 14 10/08/2018    ALKPHOS 94 10/08/2018    BILITOTAL 0.8 10/08/2018     OTHER:   Lab Results   Component Value Date    TAMMY 9.7 12/27/2018    TSH 1.53 10/08/2018       Anesthesia Plan    ASA Status:  3      Anesthesia Type: MAC.              Consents    Anesthesia Plan(s) and associated risks, benefits, and realistic alternatives discussed. Questions answered and patient/representative(s) expressed understanding.     - Discussed: Risks, Benefits and Alternatives for BOTH SEDATION and the PROCEDURE were discussed     - Discussed with:  Patient, Spouse      - Extended Intubation/Ventilatory Support Discussed: No.      - Patient is DNR/DNI Status: No    Use of blood products discussed: No .     Postoperative Care            Comments:                Kristi Ward MD

## 2022-10-20 NOTE — ANESTHESIA POSTPROCEDURE EVALUATION
Patient: Jaylan Alberto    Procedure: Procedure(s):  COLONOSCOPY       Anesthesia Type:  MAC    Note:  Disposition: Outpatient   Postop Pain Control: Uneventful            Sign Out: Well controlled pain   PONV: No   Neuro/Psych: Uneventful            Sign Out: Acceptable/Baseline neuro status   Airway/Respiratory: Uneventful            Sign Out: Acceptable/Baseline resp. status   CV/Hemodynamics: Uneventful            Sign Out: Acceptable CV status; No obvious hypovolemia; No obvious fluid overload   Other NRE: NONE   DID A NON-ROUTINE EVENT OCCUR? No           Last vitals:  Vitals Value Taken Time   /67 10/20/22 1515   Temp 36.7  C (98  F) 10/20/22 1445   Pulse 89 10/20/22 1515   Resp     SpO2 94 % 10/20/22 1515       Electronically Signed By: rKisti Ward MD  October 20, 2022  3:47 PM

## 2022-10-20 NOTE — H&P
Jaylan Alberto is an 82 year old male.Here for a history of rectal bleeding    Past Medical History:   Diagnosis Date     Hypertension        Allergies:   Allergies   Allergen Reactions     Chocolate Flavor Other (See Comments)     Sneezing     No Known Drug Allergies Unknown       Active Problems:    * No active hospital problems. *    Blood pressure (!) 145/80, pulse 95, temperature 97.7  F (36.5  C), temperature source Oral, resp. rate 18, SpO2 95 %.    Review of Systems    Physical Exam-Chest clear, cor nml    Assessment:  Rectal bleeding    Plan:  Colonoscopy today    Subhash Murillo MD  10/20/2022

## 2022-10-20 NOTE — BRIEF OP NOTE
[facility]    Brief Operative Note    Pre-operative diagnosis: Rectal bleeding [K62.5]  Post-operative diagnosis Nml colonoscopy, internal hemorrhoids    Procedure: Procedure(s):  COLONOSCOPY  Surgeon: Surgeon(s) and Role:     * Subhash Murillo MD - Primary  Anesthesia: MAC   Estimated Blood Loss: None    Drains: None  Specimens: * No specimens in log *  Findings:   Internal hemorrhoids, colon otherwise normal.  Complications: None.  Implants: * No implants in log *

## 2022-11-17 NOTE — OP NOTE
Procedure Date: 10/20/2022    PREOPERATIVE DIAGNOSIS:  Rectal bleeding.    POSTOPERATIVE DIAGNOSIS:  Normal colon.    OPERATION:  Colonoscopy.    SURGEON:  Subhash Murillo MD    INSTRUMENTS: Video colonoscope was used.    MEDICATIONS: MAC anesthesia.    INDICATIONS FOR PROCEDURE:  The patient is an 82-year-old man with resolved rectal bleeding, who presents for colonoscopy to understand the etiology of his bleeding.  Risks, benefits, expected outcome discussed.  He is eager to proceed.    DESCRIPTION OF PROCEDURE:  After the uneventful induction of MAC anesthesia, the patient was placed comfortably in left lateral decubitus position.  Digital rectal exam was performed, which was normal.  A well-lubricated video colonoscope was then introduced finding the distal rectum, advanced under direct vision to the cecum using combination of gentle advance, withdraw and torque maneuvers.  Quality of prep was excellent. It should be a highly reliable examination.  After confirming our presence in the cecum, identifying a normal-appearing ileocecal valve, scope was withdrawn.  Careful circumferential examination of colonic mucosa was made.  No evidence of mass lesions, polyps or cancers were seen.  There were some mildly enlarged internal hemorrhoids.  All excess gas aspirated.  Scope was withdrawn without difficulty.  The patient tolerated the procedure well and will be observed in our recovery area for a short period of time prior to being discharged home with the usual post-colonoscopy instructions.  Plan will be for no further colonoscopic surveillance unless new problems develop.    Subhash Murillo MD        D: 2022   T: 2022   MT: [email or web address]    Name:     PHAN ACOSTA  MRN:      [id number]        Account:        [id number]   :      1940           Procedure Date: 10/20/2022     Document: [id number]

## 2024-04-09 ENCOUNTER — LAB REQUISITION (OUTPATIENT)
Dept: LAB | Facility: CLINIC | Age: 84
End: 2024-04-09
Payer: COMMERCIAL

## 2024-04-09 DIAGNOSIS — M25.562 PAIN IN LEFT KNEE: ICD-10-CM

## 2024-04-09 LAB
APPEARANCE FLD: ABNORMAL
CELL COUNT BODY FLUID SOURCE: ABNORMAL
COLOR FLD: ABNORMAL
EOSINOPHIL NFR FLD MANUAL: 1 %
GRAM STAIN RESULT: NORMAL
GRAM STAIN RESULT: NORMAL
LYMPHOCYTES NFR FLD MANUAL: 38 %
MONOS+MACROS NFR FLD MANUAL: 0 %
NEUTS BAND NFR FLD MANUAL: 11 %
OTHER CELLS FLD MANUAL: 51 %
WBC # FLD AUTO: 681 /UL

## 2024-04-09 PROCEDURE — 87205 SMEAR GRAM STAIN: CPT | Mod: ORL | Performed by: PHYSICIAN ASSISTANT

## 2024-04-09 PROCEDURE — 87075 CULTR BACTERIA EXCEPT BLOOD: CPT | Mod: ORL | Performed by: PHYSICIAN ASSISTANT

## 2024-04-09 PROCEDURE — 87070 CULTURE OTHR SPECIMN AEROBIC: CPT | Mod: ORL | Performed by: PHYSICIAN ASSISTANT

## 2024-04-09 PROCEDURE — 89051 BODY FLUID CELL COUNT: CPT | Mod: ORL | Performed by: PHYSICIAN ASSISTANT

## 2024-04-09 PROCEDURE — 89050 BODY FLUID CELL COUNT: CPT | Mod: ORL | Performed by: PHYSICIAN ASSISTANT

## 2024-04-14 LAB — BACTERIA SNV CULT: NO GROWTH

## 2024-04-23 LAB — BACTERIA SNV CULT: NORMAL

## (undated) DEVICE — SOL WATER IRRIG 1000ML BOTTLE 2F7114

## (undated) DEVICE — SUCTION MANIFOLD NEPTUNE 2 SYS 1 PORT 702-025-000

## (undated) DEVICE — TUBING SUCTION MEDI-VAC 1/4"X20' N620A - HE